# Patient Record
Sex: FEMALE | Race: WHITE | Employment: OTHER | URBAN - NONMETROPOLITAN AREA
[De-identification: names, ages, dates, MRNs, and addresses within clinical notes are randomized per-mention and may not be internally consistent; named-entity substitution may affect disease eponyms.]

---

## 2017-12-17 ENCOUNTER — HOSPITAL ENCOUNTER (OUTPATIENT)
Age: 37
Discharge: HOME | End: 2017-12-17
Payer: COMMERCIAL

## 2017-12-17 LAB
ALBUMIN SERPL-MCNC: 3.9 G/DL (ref 3.5–5.1)
ALP BLD-CCNC: 310 U/L (ref 38–126)
ALT SERPL-CCNC: 54 U/L (ref 11–66)
ANION GAP SERPL CALCULATED.3IONS-SCNC: 22 MEQ/L (ref 8–16)
AST SERPL-CCNC: 263 U/L (ref 5–40)
BILIRUB SERPL-MCNC: 3.1 MG/DL (ref 0.3–1.2)
BUN BLDV-MCNC: 3 MG/DL (ref 7–22)
CALCIUM SERPL-MCNC: 8.4 MG/DL (ref 8.5–10.5)
CHLORIDE BLD-SCNC: 98 MEQ/L (ref 98–111)
CO2: 24 MEQ/L (ref 23–33)
CREAT SERPL-MCNC: 0.4 MG/DL (ref 0.4–1.2)
FOLATE: 8.8 NG/ML (ref 4.8–24.2)
GFR SERPL CREATININE-BSD FRML MDRD: > 90 ML/MIN/1.73M2
GLUCOSE BLD-MCNC: 122 MG/DL (ref 70–108)
POTASSIUM SERPL-SCNC: 3.8 MEQ/L (ref 3.5–5.2)
SCAN OF BLOOD SMEAR: NORMAL
SODIUM BLD-SCNC: 144 MEQ/L (ref 135–145)
TOTAL PROTEIN: 7.2 G/DL (ref 6.1–8)
VITAMIN B-12: 1348 PG/ML (ref 211–911)

## 2017-12-17 PROCEDURE — 82746 ASSAY OF FOLIC ACID SERUM: CPT

## 2017-12-17 PROCEDURE — 85025 COMPLETE CBC W/AUTO DIFF WBC: CPT

## 2017-12-17 PROCEDURE — 36415 COLL VENOUS BLD VENIPUNCTURE: CPT

## 2017-12-17 PROCEDURE — 80053 COMPREHEN METABOLIC PANEL: CPT

## 2017-12-17 PROCEDURE — 82607 VITAMIN B-12: CPT

## 2017-12-18 LAB
ANISOCYTOSIS: ABNORMAL
BASOPHILS # BLD: 0.3 %
BASOPHILS ABSOLUTE: 0 THOU/MM3 (ref 0–0.1)
EOSINOPHIL # BLD: 0.7 %
EOSINOPHILS ABSOLUTE: 0 THOU/MM3 (ref 0–0.4)
HCT VFR BLD CALC: 36.8 % (ref 37–47)
HEMOGLOBIN: 12.3 GM/DL (ref 12–16)
LYMPHOCYTES # BLD: 45.2 %
LYMPHOCYTES ABSOLUTE: 2.4 THOU/MM3 (ref 1–4.8)
MACROCYTES: ABNORMAL
MCH RBC QN AUTO: 33.1 PG (ref 27–31)
MCHC RBC AUTO-ENTMCNC: 33.4 GM/DL (ref 33–37)
MCV RBC AUTO: 99.1 FL (ref 81–99)
MONOCYTES # BLD: 7 %
MONOCYTES ABSOLUTE: 0.4 THOU/MM3 (ref 0.4–1.3)
NUCLEATED RED BLOOD CELLS: 0 /100 WBC
PATHOLOGIST REVIEW: ABNORMAL
PDW BLD-RTO: 14.8 % (ref 11.5–14.5)
PLATELET # BLD: 87 THOU/MM3 (ref 130–400)
PLATELET ESTIMATE: ABNORMAL
PMV BLD AUTO: 9.6 MCM (ref 7.4–10.4)
POIKILOCYTES: SLIGHT
RBC # BLD: 3.72 MILL/MM3 (ref 4.2–5.4)
SEG NEUTROPHILS: 46.8 %
SEGMENTED NEUTROPHILS ABSOLUTE COUNT: 2.5 THOU/MM3 (ref 1.8–7.7)
TARGET CELLS: ABNORMAL
WBC # BLD: 5.4 THOU/MM3 (ref 4.8–10.8)

## 2018-02-18 ENCOUNTER — APPOINTMENT (OUTPATIENT)
Dept: CT IMAGING | Age: 38
DRG: 369 | End: 2018-02-18
Payer: MEDICARE

## 2018-02-18 ENCOUNTER — APPOINTMENT (OUTPATIENT)
Dept: GENERAL RADIOLOGY | Age: 38
DRG: 369 | End: 2018-02-18
Payer: MEDICARE

## 2018-02-18 ENCOUNTER — HOSPITAL ENCOUNTER (INPATIENT)
Age: 38
LOS: 6 days | Discharge: HOME | DRG: 369 | End: 2018-02-24
Attending: FAMILY MEDICINE | Admitting: INTERNAL MEDICINE
Payer: MEDICARE

## 2018-02-18 DIAGNOSIS — K72.10 END STAGE LIVER DISEASE (HCC): ICD-10-CM

## 2018-02-18 DIAGNOSIS — R10.84 GENERALIZED ABDOMINAL PAIN: ICD-10-CM

## 2018-02-18 DIAGNOSIS — K92.0 HEMATEMESIS WITH NAUSEA: Primary | ICD-10-CM

## 2018-02-18 LAB
ALBUMIN SERPL-MCNC: 1.8 GM/DL (ref 3.4–5)
ALBUMIN SERPL-MCNC: 2.2 G/DL (ref 3.5–5.1)
ALP BLD-CCNC: 276 U/L (ref 38–126)
ALP BLD-CCNC: 329 U/L (ref 46–116)
ALT SERPL-CCNC: 24 U/L (ref 11–66)
ALT SERPL-CCNC: 33 U/L (ref 14–63)
ANALYZED BY:: NORMAL
ANION GAP SERPL CALCULATED.3IONS-SCNC: 14 MEQ/L (ref 8–16)
ANION GAP: 11 MEQ/L (ref 8–16)
AST SERPL-CCNC: 103 U/L (ref 5–40)
AST SERPL-CCNC: 118 U/L (ref 15–37)
BASOPHILS # BLD: 0 %
BASOPHILS # BLD: 0 % (ref 0–3)
BASOPHILS ABSOLUTE: 0 THOU/MM3 (ref 0–0.1)
BILIRUB SERPL-MCNC: 3.5 MG/DL (ref 0.2–1)
BILIRUB SERPL-MCNC: 3.5 MG/DL (ref 0.3–1.2)
BUN BLDV-MCNC: 1 MG/DL (ref 7–18)
BUN BLDV-MCNC: < 2 MG/DL (ref 7–22)
CALCIUM SERPL-MCNC: 7.5 MG/DL (ref 8.5–10.5)
CHLORIDE BLD-SCNC: 98 MEQ/L (ref 98–107)
CHLORIDE BLD-SCNC: 99 MEQ/L (ref 98–111)
CO2: 21 MEQ/L (ref 23–33)
CO2: 25 MEQ/L (ref 21–32)
CREAT SERPL-MCNC: 0.2 MG/DL (ref 0.4–1.2)
CREAT SERPL-MCNC: 0.6 MG/DL (ref 0.6–1.3)
DATE OF COLLECTION: NORMAL
DIFFERENTIAL, MANUAL: NORMAL
DRAWN BY: NORMAL
EOSINOPHIL # BLD: 0 %
EOSINOPHILS ABSOLUTE: 0 THOU/MM3 (ref 0–0.4)
EOSINOPHILS RELATIVE PERCENT: 0 % (ref 0–4)
ETHYL ALCOHOL: 0.27 % (GM/DL)
GFR SERPL CREATININE-BSD FRML MDRD: > 90 ML/MIN/1.73M2
GFR, ESTIMATED: > 90 ML/MIN/1.73M2
GLUCOSE BLD-MCNC: 146 MG/DL (ref 74–106)
GLUCOSE BLD-MCNC: 147 MG/DL (ref 70–108)
HCT VFR BLD CALC: 26.2 % (ref 37–47)
HCT VFR BLD CALC: 29.3 % (ref 37–47)
HEMOGLOBIN: 10.1 GM/DL (ref 12–16)
HEMOGLOBIN: 8.6 GM/DL (ref 12–16)
INR BLD: 1.78 (ref 0.85–1.13)
LACTATE: 6.3 MMOL/L (ref 0.9–1.7)
LACTIC ACID: 3.4 MMOL/L (ref 0.5–2.2)
LIPASE: 43 U/L (ref 73–393)
LYMPHOCYTES # BLD: 30 % (ref 15–47)
LYMPHOCYTES # BLD: 8.6 %
LYMPHOCYTES ABSOLUTE: 0.5 THOU/MM3 (ref 1–4.8)
Lab: 830
Lab: NORMAL
MACROCYTES: ABNORMAL
MAGNESIUM: 1.8 MG/DL (ref 1.6–2.4)
MCH RBC QN AUTO: 33 PG (ref 27–31)
MCH RBC QN AUTO: 33.2 PG (ref 27–31)
MCHC RBC AUTO-ENTMCNC: 33 GM/DL (ref 33–37)
MCHC RBC AUTO-ENTMCNC: 34.5 GM/DL (ref 33–37)
MCV RBC AUTO: 100 FL (ref 81–99)
MCV RBC AUTO: 96.2 FL (ref 81–99)
MONOCYTES # BLD: 9.7 %
MONOCYTES ABSOLUTE: 0.6 THOU/MM3 (ref 0.4–1.3)
MONOCYTES: 7 % (ref 0–12)
MRSA SCREEN RT-PCR: POSITIVE
NUCLEATED RED BLOOD CELLS: 0 /100 WBC
PDW BLD-RTO: 12 % (ref 11.5–14.5)
PDW BLD-RTO: 13.7 % (ref 11.5–14.5)
PHOSPHORUS: 3 MG/DL (ref 2.4–4.7)
PLATELET # BLD: 163 THOU/MM3 (ref 130–400)
PLATELET # BLD: 170 THOU/MM3 (ref 130–400)
PLATELET ESTIMATE: ABNORMAL
PMV BLD AUTO: 8.5 FL (ref 7.4–10.4)
PMV BLD AUTO: 8.8 FL (ref 7.4–10.4)
POC CALCIUM: 7.5 MG/DL (ref 8.5–10.1)
POTASSIUM SERPL-SCNC: 4.2 MEQ/L (ref 3.5–5.1)
POTASSIUM SERPL-SCNC: 4.5 MEQ/L (ref 3.5–5.2)
RBC # BLD: 2.62 MILL/MM3 (ref 4.2–5.4)
RBC # BLD: 3.05 MILL/MM3 (ref 4.2–5.4)
SEG NEUTROPHILS: 81.7 %
SEGMENTED NEUTROPHILS ABSOLUTE COUNT: 5 THOU/MM3 (ref 1.8–7.7)
SEGS: 61 % (ref 43–75)
SODIUM BLD-SCNC: 134 MEQ/L (ref 135–145)
SODIUM BLD-SCNC: 134 MEQ/L (ref 136–145)
STABS BLOOD: 2 % (ref 0–4)
TOTAL PROTEIN: 5.2 G/DL (ref 6.1–8)
TOTAL PROTEIN: 5.8 GM/DL (ref 6.4–8.2)
WBC # BLD: 6.1 THOU/MM3 (ref 4.8–10.8)
WBC # BLD: 8.5 THOU/MM3 (ref 4.8–10.8)

## 2018-02-18 PROCEDURE — G0480 DRUG TEST DEF 1-7 CLASSES: HCPCS

## 2018-02-18 PROCEDURE — 36415 COLL VENOUS BLD VENIPUNCTURE: CPT

## 2018-02-18 PROCEDURE — 2700000000 HC OXYGEN THERAPY PER DAY

## 2018-02-18 PROCEDURE — 85610 PROTHROMBIN TIME: CPT

## 2018-02-18 PROCEDURE — 83605 ASSAY OF LACTIC ACID: CPT

## 2018-02-18 PROCEDURE — 6360000002 HC RX W HCPCS: Performed by: INTERNAL MEDICINE

## 2018-02-18 PROCEDURE — 2580000003 HC RX 258: Performed by: INTERNAL MEDICINE

## 2018-02-18 PROCEDURE — P9016 RBC LEUKOCYTES REDUCED: HCPCS

## 2018-02-18 PROCEDURE — 87641 MR-STAPH DNA AMP PROBE: CPT

## 2018-02-18 PROCEDURE — C9113 INJ PANTOPRAZOLE SODIUM, VIA: HCPCS | Performed by: FAMILY MEDICINE

## 2018-02-18 PROCEDURE — 2580000003 HC RX 258: Performed by: FAMILY MEDICINE

## 2018-02-18 PROCEDURE — 99285 EMERGENCY DEPT VISIT HI MDM: CPT

## 2018-02-18 PROCEDURE — 85025 COMPLETE CBC W/AUTO DIFF WBC: CPT

## 2018-02-18 PROCEDURE — 2000000000 HC ICU R&B

## 2018-02-18 PROCEDURE — 80053 COMPREHEN METABOLIC PANEL: CPT

## 2018-02-18 PROCEDURE — 6360000002 HC RX W HCPCS: Performed by: FAMILY MEDICINE

## 2018-02-18 PROCEDURE — 96361 HYDRATE IV INFUSION ADD-ON: CPT

## 2018-02-18 PROCEDURE — 96375 TX/PRO/DX INJ NEW DRUG ADDON: CPT

## 2018-02-18 PROCEDURE — 6360000002 HC RX W HCPCS

## 2018-02-18 PROCEDURE — 2500000003 HC RX 250 WO HCPCS: Performed by: INTERNAL MEDICINE

## 2018-02-18 PROCEDURE — 36430 TRANSFUSION BLD/BLD COMPNT: CPT

## 2018-02-18 PROCEDURE — 2720000010 HC SURG SUPPLY STERILE: Performed by: INTERNAL MEDICINE

## 2018-02-18 PROCEDURE — 84100 ASSAY OF PHOSPHORUS: CPT

## 2018-02-18 PROCEDURE — 96374 THER/PROPH/DIAG INJ IV PUSH: CPT

## 2018-02-18 PROCEDURE — 83735 ASSAY OF MAGNESIUM: CPT

## 2018-02-18 PROCEDURE — 83690 ASSAY OF LIPASE: CPT

## 2018-02-18 PROCEDURE — 99291 CRITICAL CARE FIRST HOUR: CPT | Performed by: INTERNAL MEDICINE

## 2018-02-18 PROCEDURE — 71046 X-RAY EXAM CHEST 2 VIEWS: CPT

## 2018-02-18 PROCEDURE — 0W9G3ZZ DRAINAGE OF PERITONEAL CAVITY, PERCUTANEOUS APPROACH: ICD-10-PCS | Performed by: INTERNAL MEDICINE

## 2018-02-18 PROCEDURE — 99152 MOD SED SAME PHYS/QHP 5/>YRS: CPT | Performed by: INTERNAL MEDICINE

## 2018-02-18 PROCEDURE — 3609012300 HC EGD BAND LIGATION ESOPHGEAL/GASTRIC VARICES: Performed by: INTERNAL MEDICINE

## 2018-02-18 PROCEDURE — 87081 CULTURE SCREEN ONLY: CPT

## 2018-02-18 PROCEDURE — 0W3P8ZZ CONTROL BLEEDING IN GASTROINTESTINAL TRACT, VIA NATURAL OR ARTIFICIAL OPENING ENDOSCOPIC: ICD-10-PCS | Performed by: INTERNAL MEDICINE

## 2018-02-18 PROCEDURE — 74176 CT ABD & PELVIS W/O CONTRAST: CPT

## 2018-02-18 RX ORDER — LORAZEPAM 1 MG/1
1 TABLET ORAL
Status: DISCONTINUED | OUTPATIENT
Start: 2018-02-18 | End: 2018-02-24 | Stop reason: HOSPADM

## 2018-02-18 RX ORDER — SODIUM CHLORIDE 0.9 % (FLUSH) 0.9 %
10 SYRINGE (ML) INJECTION PRN
Status: DISCONTINUED | OUTPATIENT
Start: 2018-02-18 | End: 2018-02-24 | Stop reason: HOSPADM

## 2018-02-18 RX ORDER — SODIUM CHLORIDE 0.9 % (FLUSH) 0.9 %
10 SYRINGE (ML) INJECTION EVERY 12 HOURS SCHEDULED
Status: DISCONTINUED | OUTPATIENT
Start: 2018-02-18 | End: 2018-02-24 | Stop reason: HOSPADM

## 2018-02-18 RX ORDER — 0.9 % SODIUM CHLORIDE 0.9 %
500 INTRAVENOUS SOLUTION INTRAVENOUS ONCE
Status: COMPLETED | OUTPATIENT
Start: 2018-02-18 | End: 2018-02-18

## 2018-02-18 RX ORDER — FENTANYL CITRATE 50 UG/ML
INJECTION, SOLUTION INTRAMUSCULAR; INTRAVENOUS PRN
Status: DISCONTINUED | OUTPATIENT
Start: 2018-02-18 | End: 2018-02-24 | Stop reason: HOSPADM

## 2018-02-18 RX ORDER — 0.9 % SODIUM CHLORIDE 0.9 %
1000 INTRAVENOUS SOLUTION INTRAVENOUS ONCE
Status: COMPLETED | OUTPATIENT
Start: 2018-02-18 | End: 2018-02-18

## 2018-02-18 RX ORDER — OCTREOTIDE ACETATE 100 UG/ML
100 INJECTION, SOLUTION INTRAVENOUS; SUBCUTANEOUS ONCE
Status: COMPLETED | OUTPATIENT
Start: 2018-02-18 | End: 2018-02-18

## 2018-02-18 RX ORDER — LORAZEPAM 2 MG/ML
4 INJECTION INTRAMUSCULAR
Status: DISCONTINUED | OUTPATIENT
Start: 2018-02-18 | End: 2018-02-24 | Stop reason: HOSPADM

## 2018-02-18 RX ORDER — MIDAZOLAM HYDROCHLORIDE 1 MG/ML
INJECTION INTRAMUSCULAR; INTRAVENOUS PRN
Status: DISCONTINUED | OUTPATIENT
Start: 2018-02-18 | End: 2018-02-24 | Stop reason: HOSPADM

## 2018-02-18 RX ORDER — LORAZEPAM 2 MG/ML
INJECTION INTRAMUSCULAR
Status: COMPLETED
Start: 2018-02-18 | End: 2018-02-18

## 2018-02-18 RX ORDER — LORAZEPAM 2 MG/ML
3 INJECTION INTRAMUSCULAR
Status: DISCONTINUED | OUTPATIENT
Start: 2018-02-18 | End: 2018-02-24 | Stop reason: HOSPADM

## 2018-02-18 RX ORDER — DEXMEDETOMIDINE HYDROCHLORIDE 4 UG/ML
0.2 INJECTION, SOLUTION INTRAVENOUS CONTINUOUS
Status: DISCONTINUED | OUTPATIENT
Start: 2018-02-18 | End: 2018-02-18 | Stop reason: SDUPTHER

## 2018-02-18 RX ORDER — LORAZEPAM 2 MG/1
4 TABLET ORAL
Status: DISCONTINUED | OUTPATIENT
Start: 2018-02-18 | End: 2018-02-24 | Stop reason: HOSPADM

## 2018-02-18 RX ORDER — LORAZEPAM 2 MG/1
2 TABLET ORAL
Status: DISCONTINUED | OUTPATIENT
Start: 2018-02-18 | End: 2018-02-24 | Stop reason: HOSPADM

## 2018-02-18 RX ORDER — LORAZEPAM 2 MG/ML
2 INJECTION INTRAMUSCULAR
Status: DISCONTINUED | OUTPATIENT
Start: 2018-02-18 | End: 2018-02-24 | Stop reason: HOSPADM

## 2018-02-18 RX ORDER — ONDANSETRON 2 MG/ML
4 INJECTION INTRAMUSCULAR; INTRAVENOUS ONCE
Status: COMPLETED | OUTPATIENT
Start: 2018-02-18 | End: 2018-02-18

## 2018-02-18 RX ORDER — LORAZEPAM 2 MG/ML
1 INJECTION INTRAMUSCULAR ONCE
Status: COMPLETED | OUTPATIENT
Start: 2018-02-18 | End: 2018-02-18

## 2018-02-18 RX ORDER — METOPROLOL TARTRATE 5 MG/5ML
5 INJECTION INTRAVENOUS EVERY 6 HOURS PRN
Status: DISCONTINUED | OUTPATIENT
Start: 2018-02-18 | End: 2018-02-24 | Stop reason: HOSPADM

## 2018-02-18 RX ORDER — MORPHINE SULFATE 2 MG/ML
2 INJECTION, SOLUTION INTRAMUSCULAR; INTRAVENOUS EVERY 4 HOURS PRN
Status: DISCONTINUED | OUTPATIENT
Start: 2018-02-18 | End: 2018-02-22 | Stop reason: CLARIF

## 2018-02-18 RX ORDER — LORAZEPAM 2 MG/ML
1 INJECTION INTRAMUSCULAR
Status: DISCONTINUED | OUTPATIENT
Start: 2018-02-18 | End: 2018-02-24 | Stop reason: HOSPADM

## 2018-02-18 RX ORDER — PANTOPRAZOLE SODIUM 40 MG/10ML
80 INJECTION, POWDER, LYOPHILIZED, FOR SOLUTION INTRAVENOUS ONCE
Status: COMPLETED | OUTPATIENT
Start: 2018-02-18 | End: 2018-02-18

## 2018-02-18 RX ADMIN — Medication 10 ML: at 21:31

## 2018-02-18 RX ADMIN — SODIUM CHLORIDE 1000 ML: 9 INJECTION, SOLUTION INTRAVENOUS at 10:21

## 2018-02-18 RX ADMIN — OCTREOTIDE ACETATE 100 MCG: 100 INJECTION, SOLUTION INTRAVENOUS; SUBCUTANEOUS at 12:20

## 2018-02-18 RX ADMIN — Medication 8 MG/HR: at 13:27

## 2018-02-18 RX ADMIN — Medication 50 MCG/HR: at 21:10

## 2018-02-18 RX ADMIN — Medication 8 MG/HR: at 21:11

## 2018-02-18 RX ADMIN — PANTOPRAZOLE SODIUM 40 MG: 40 INJECTION, POWDER, FOR SOLUTION INTRAVENOUS at 08:36

## 2018-02-18 RX ADMIN — LORAZEPAM 2 MG: 2 INJECTION INTRAMUSCULAR; INTRAVENOUS at 09:28

## 2018-02-18 RX ADMIN — CEFTRIAXONE 1 G: 1 INJECTION, POWDER, FOR SOLUTION INTRAMUSCULAR; INTRAVENOUS at 13:55

## 2018-02-18 RX ADMIN — LORAZEPAM 1 MG: 2 INJECTION INTRAMUSCULAR; INTRAVENOUS at 09:28

## 2018-02-18 RX ADMIN — MORPHINE SULFATE 2 MG: 2 INJECTION, SOLUTION INTRAMUSCULAR; INTRAVENOUS at 14:39

## 2018-02-18 RX ADMIN — FOLIC ACID: 5 INJECTION, SOLUTION INTRAMUSCULAR; INTRAVENOUS; SUBCUTANEOUS at 16:39

## 2018-02-18 RX ADMIN — SODIUM CHLORIDE 250 ML: 9 INJECTION, SOLUTION INTRAVENOUS at 08:34

## 2018-02-18 RX ADMIN — Medication 50 MCG/HR: at 12:20

## 2018-02-18 RX ADMIN — ONDANSETRON 4 MG: 2 INJECTION INTRAMUSCULAR; INTRAVENOUS at 08:33

## 2018-02-18 RX ADMIN — Medication 0.2 MCG/KG/HR: at 16:59

## 2018-02-18 ASSESSMENT — ENCOUNTER SYMPTOMS
COUGH: 0
EYE DISCHARGE: 0
CONSTIPATION: 0
COLOR CHANGE: 0
WHEEZING: 0
NAUSEA: 1
SORE THROAT: 0
RHINORRHEA: 0
EYE REDNESS: 0
VOICE CHANGE: 0
STRIDOR: 0
DIARRHEA: 0
FACIAL SWELLING: 0
PHOTOPHOBIA: 0
VOMITING: 0
CHEST TIGHTNESS: 0
SHORTNESS OF BREATH: 0
ABDOMINAL DISTENTION: 1
ABDOMINAL PAIN: 1
EYE PAIN: 0
BACK PAIN: 0

## 2018-02-18 ASSESSMENT — PAIN DESCRIPTION - LOCATION
LOCATION: ABDOMEN
LOCATION: ABDOMEN

## 2018-02-18 ASSESSMENT — PAIN SCALES - GENERAL
PAINLEVEL_OUTOF10: 8
PAINLEVEL_OUTOF10: 9

## 2018-02-18 ASSESSMENT — PAIN DESCRIPTION - DESCRIPTORS: DESCRIPTORS: DISCOMFORT

## 2018-02-18 ASSESSMENT — PAIN DESCRIPTION - PAIN TYPE
TYPE: ACUTE PAIN
TYPE: ACUTE PAIN

## 2018-02-18 NOTE — POST SEDATION
[address] [address]  Sedation/Analgesia Post Sedation Record    Patient: Gordo Paulino : 1980  Med Rec#: [id number] Acc#: [id number]   Procedure Performed By: Lannie Blizzard  Primary Care Physician: Riaz Troncoso NP    POST-PROCEDURE    Physicians/Assistants: Lannie Blizzard  Procedure Performed:    Sedation/Anesthesia:     Estimated Blood Loss:          ml  Specimens Removed:  [x]None []Other:      Disposition of Specimen:  []Pathology [x]Other      Complications:   [x]None Immediate []Other:     Post-procedure Diagnosis/Findings:             Recommendations:     varices Lannie Blizzard, MD Syliva Epp  Electronically signed 2018 at 1:07 PM

## 2018-02-18 NOTE — PROGRESS NOTES
Pt. Declining NG tube at this time, st. \"I had surgery on my nose and last time they had to have anesthia come in and place it in radiology, I will pull it out\".

## 2018-02-18 NOTE — ED NOTES
ABdomen distended, ascites, hypoactive BS x 4, tenderness t/o.      Denver De Jesus RN  02/18/18 6230
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
## 2018-02-18 NOTE — CONSULTS
daily     Historical Provider, MD   vitamin B-1 100 MG tablet Take 1 tablet by mouth daily 6/12/17   Leigh Plunkett MD   Naproxen Sodium (ALEVE PO) Take 1 tablet by mouth every 12 hours as needed    Historical Provider, MD   promethazine (PHENERGAN) 12.5 MG tablet Take 12.5 mg by mouth every 4 hours as needed for Nausea    Historical Provider, MD   LORazepam (ATIVAN) 0.5 MG tablet Take 0.5 mg by mouth every 6 hours as needed for Anxiety    Historical Provider, MD   sucralfate (CARAFATE) 1 GM/10ML suspension Take 10 mLs by mouth 4 times daily (before meals and nightly) 8/30/16   Naomy North CNP   folic acid (FOLVITE) 1 MG tablet Take 1 mg by mouth daily    Historical Provider, MD   nadolol (CORGARD) 20 MG tablet Take 20 mg by mouth daily     Historical Provider, MD   omeprazole (PRILOSEC) 20 MG capsule Take 20 mg by mouth daily    Historical Provider, MD   melatonin 3 MG TABS tablet Take 10 mg by mouth nightly as needed     Historical Provider, MD   Cyanocobalamin (VITAMIN B-12) 1000 MCG CR tablet Inject 1,000 mcg into the muscle every 30 days     Historical Provider, MD   spironolactone (ALDACTONE) 100 MG tablet Take 50 mg by mouth every other day     Historical Provider, MD   Multiple Vitamins-Minerals (THERAPEUTIC MULTIVITAMIN-MINERALS) tablet Take 1 tablet by mouth daily.     Historical Provider, MD     Additional information:       PHYSICAL:   Heart:  [x]Regular rate and rhythm  []Other:    Lungs:  [x]Clear    []Other:    Abdomen: [x]Soft    []Other:    Mental Status: [x]Alert & Oriented  []Other:      VITAL SIGNS   Patient Vitals for the past 24 hrs:   BP Temp Temp src Pulse Resp SpO2 Height Weight   02/18/18 1128 118/84 98.2 °F (36.8 °C) Oral 124 21 99 % - -   02/18/18 1120 - - - 128 20 93 % 5' 5\" (1.651 m) 154 lb 1.6 oz (69.9 kg)   02/18/18 1020 114/82 - - 124 16 100 % - -   02/18/18 1005 (!) 121/95 - - 128 23 100 % - -   02/18/18 0949 112/85 - - 120 18 98 % - -   02/18/18 0934 121/79 - - 125 16 99 %

## 2018-02-19 PROBLEM — E44.0 MODERATE MALNUTRITION (HCC): Status: ACTIVE | Noted: 2018-02-19

## 2018-02-19 LAB
ABO: NORMAL
ALBUMIN SERPL-MCNC: 3 G/DL (ref 3.5–5.1)
AMYLASE FLUID: < 3 U/L
ANION GAP SERPL CALCULATED.3IONS-SCNC: 8 MEQ/L (ref 8–16)
ANISOCYTOSIS: ABNORMAL
ANTIBODY SCREEN: NORMAL
BASOPHILS # BLD: 1 %
BASOPHILS ABSOLUTE: 0 THOU/MM3 (ref 0–0.1)
BUN BLDV-MCNC: < 2 MG/DL (ref 7–22)
CALCIUM IONIZED: 0.94 MMOL/L (ref 1.12–1.32)
CALCIUM SERPL-MCNC: 6.9 MG/DL (ref 8.5–10.5)
CHLORIDE BLD-SCNC: 105 MEQ/L (ref 98–111)
CO2: 24 MEQ/L (ref 23–33)
CREAT SERPL-MCNC: 0.3 MG/DL (ref 0.4–1.2)
EOSINOPHIL # BLD: 1.1 %
EOSINOPHILS ABSOLUTE: 0 THOU/MM3 (ref 0–0.4)
GFR SERPL CREATININE-BSD FRML MDRD: > 90 ML/MIN/1.73M2
GLUCOSE BLD-MCNC: 91 MG/DL (ref 70–108)
GLUCOSE, FLUID: 115 MG/DL
HCT VFR BLD CALC: 20.9 % (ref 37–47)
HCT VFR BLD CALC: 28.9 % (ref 37–47)
HEMOGLOBIN: 7 GM/DL (ref 12–16)
HEMOGLOBIN: 9.9 GM/DL (ref 12–16)
LD, FLUID: 43 U/L
LD: 213 U/L (ref 100–190)
LYMPHOCYTES # BLD: 35.4 %
LYMPHOCYTES ABSOLUTE: 1.1 THOU/MM3 (ref 1–4.8)
MACROCYTES: ABNORMAL
MAGNESIUM: 1.7 MG/DL (ref 1.6–2.4)
MCH RBC QN AUTO: 33.4 PG (ref 27–31)
MCH RBC QN AUTO: 34.5 PG (ref 27–31)
MCHC RBC AUTO-ENTMCNC: 33.5 GM/DL (ref 33–37)
MCHC RBC AUTO-ENTMCNC: 34.2 GM/DL (ref 33–37)
MCV RBC AUTO: 103 FL (ref 81–99)
MCV RBC AUTO: 97.5 FL (ref 81–99)
MONOCYTES # BLD: 12.7 %
MONOCYTES ABSOLUTE: 0.4 THOU/MM3 (ref 0.4–1.3)
NUCLEATED RED BLOOD CELLS: 0 /100 WBC
PATHOLOGIST REVIEW: ABNORMAL
PDW BLD-RTO: 13.1 % (ref 11.5–14.5)
PDW BLD-RTO: 15.3 % (ref 11.5–14.5)
PHOSPHORUS: 2.2 MG/DL (ref 2.4–4.7)
PLATELET # BLD: 91 THOU/MM3 (ref 130–400)
PLATELET # BLD: 93 THOU/MM3 (ref 130–400)
PLATELET ESTIMATE: ABNORMAL
PMV BLD AUTO: 9.3 FL (ref 7.4–10.4)
PMV BLD AUTO: 9.5 FL (ref 7.4–10.4)
POIKILOCYTES: ABNORMAL
POTASSIUM SERPL-SCNC: 4.5 MEQ/L (ref 3.5–5.2)
PROTEIN FLUID: 0.4 GM/DL
RBC # BLD: 2.03 MILL/MM3 (ref 4.2–5.4)
RBC # BLD: 2.96 MILL/MM3 (ref 4.2–5.4)
RH FACTOR: NORMAL
SCAN OF BLOOD SMEAR: NORMAL
SEG NEUTROPHILS: 49.8 %
SEGMENTED NEUTROPHILS ABSOLUTE COUNT: 1.5 THOU/MM3 (ref 1.8–7.7)
SODIUM BLD-SCNC: 137 MEQ/L (ref 135–145)
TARGET CELLS: ABNORMAL
WBC # BLD: 3 THOU/MM3 (ref 4.8–10.8)
WBC # BLD: 3.1 THOU/MM3 (ref 4.8–10.8)

## 2018-02-19 PROCEDURE — 87070 CULTURE OTHR SPECIMN AEROBIC: CPT

## 2018-02-19 PROCEDURE — 86850 RBC ANTIBODY SCREEN: CPT

## 2018-02-19 PROCEDURE — 82042 OTHER SOURCE ALBUMIN QUAN EA: CPT

## 2018-02-19 PROCEDURE — P9046 ALBUMIN (HUMAN), 25%, 20 ML: HCPCS | Performed by: INTERNAL MEDICINE

## 2018-02-19 PROCEDURE — 83615 LACTATE (LD) (LDH) ENZYME: CPT

## 2018-02-19 PROCEDURE — 85025 COMPLETE CBC W/AUTO DIFF WBC: CPT

## 2018-02-19 PROCEDURE — 2580000003 HC RX 258: Performed by: FAMILY MEDICINE

## 2018-02-19 PROCEDURE — 83735 ASSAY OF MAGNESIUM: CPT

## 2018-02-19 PROCEDURE — 6360000002 HC RX W HCPCS: Performed by: FAMILY MEDICINE

## 2018-02-19 PROCEDURE — 36415 COLL VENOUS BLD VENIPUNCTURE: CPT

## 2018-02-19 PROCEDURE — 89051 BODY FLUID CELL COUNT: CPT

## 2018-02-19 PROCEDURE — 85027 COMPLETE CBC AUTOMATED: CPT

## 2018-02-19 PROCEDURE — 80048 BASIC METABOLIC PNL TOTAL CA: CPT

## 2018-02-19 PROCEDURE — 2580000003 HC RX 258: Performed by: INTERNAL MEDICINE

## 2018-02-19 PROCEDURE — P9016 RBC LEUKOCYTES REDUCED: HCPCS

## 2018-02-19 PROCEDURE — 86923 COMPATIBILITY TEST ELECTRIC: CPT

## 2018-02-19 PROCEDURE — 6360000002 HC RX W HCPCS: Performed by: INTERNAL MEDICINE

## 2018-02-19 PROCEDURE — 36430 TRANSFUSION BLD/BLD COMPNT: CPT

## 2018-02-19 PROCEDURE — 84157 ASSAY OF PROTEIN OTHER: CPT

## 2018-02-19 PROCEDURE — 86901 BLOOD TYPING SEROLOGIC RH(D): CPT

## 2018-02-19 PROCEDURE — 84100 ASSAY OF PHOSPHORUS: CPT

## 2018-02-19 PROCEDURE — 82330 ASSAY OF CALCIUM: CPT

## 2018-02-19 PROCEDURE — 82040 ASSAY OF SERUM ALBUMIN: CPT

## 2018-02-19 PROCEDURE — 87075 CULTR BACTERIA EXCEPT BLOOD: CPT

## 2018-02-19 PROCEDURE — 87205 SMEAR GRAM STAIN: CPT

## 2018-02-19 PROCEDURE — 82150 ASSAY OF AMYLASE: CPT

## 2018-02-19 PROCEDURE — 2500000003 HC RX 250 WO HCPCS: Performed by: INTERNAL MEDICINE

## 2018-02-19 PROCEDURE — 82945 GLUCOSE OTHER FLUID: CPT

## 2018-02-19 PROCEDURE — 86900 BLOOD TYPING SEROLOGIC ABO: CPT

## 2018-02-19 PROCEDURE — 2700000000 HC OXYGEN THERAPY PER DAY

## 2018-02-19 PROCEDURE — C9113 INJ PANTOPRAZOLE SODIUM, VIA: HCPCS | Performed by: FAMILY MEDICINE

## 2018-02-19 PROCEDURE — 99291 CRITICAL CARE FIRST HOUR: CPT | Performed by: INTERNAL MEDICINE

## 2018-02-19 PROCEDURE — 2000000000 HC ICU R&B

## 2018-02-19 RX ORDER — 0.9 % SODIUM CHLORIDE 0.9 %
250 INTRAVENOUS SOLUTION INTRAVENOUS ONCE
Status: DISCONTINUED | OUTPATIENT
Start: 2018-02-19 | End: 2018-02-24 | Stop reason: HOSPADM

## 2018-02-19 RX ORDER — ALBUMIN (HUMAN) 12.5 G/50ML
25 SOLUTION INTRAVENOUS
Status: COMPLETED | OUTPATIENT
Start: 2018-02-19 | End: 2018-02-19

## 2018-02-19 RX ORDER — 0.9 % SODIUM CHLORIDE 0.9 %
250 INTRAVENOUS SOLUTION INTRAVENOUS ONCE
Status: COMPLETED | OUTPATIENT
Start: 2018-02-19 | End: 2018-02-19

## 2018-02-19 RX ORDER — ALBUMIN (HUMAN) 12.5 G/50ML
50 SOLUTION INTRAVENOUS ONCE
Status: DISCONTINUED | OUTPATIENT
Start: 2018-02-19 | End: 2018-02-19

## 2018-02-19 RX ADMIN — Medication 8 MG/HR: at 09:01

## 2018-02-19 RX ADMIN — Medication 50 MCG/HR: at 07:50

## 2018-02-19 RX ADMIN — LORAZEPAM 2 MG: 2 INJECTION INTRAMUSCULAR; INTRAVENOUS at 17:26

## 2018-02-19 RX ADMIN — Medication 10 ML: at 11:41

## 2018-02-19 RX ADMIN — CEFTRIAXONE 1 G: 1 INJECTION, POWDER, FOR SOLUTION INTRAMUSCULAR; INTRAVENOUS at 13:19

## 2018-02-19 RX ADMIN — CALCIUM GLUCONATE 2 G: 94 INJECTION, SOLUTION INTRAVENOUS at 09:09

## 2018-02-19 RX ADMIN — Medication 8 MG/HR: at 20:08

## 2018-02-19 RX ADMIN — LORAZEPAM 1 MG: 2 INJECTION INTRAMUSCULAR; INTRAVENOUS at 20:03

## 2018-02-19 RX ADMIN — SODIUM CHLORIDE 250 ML: 9 INJECTION, SOLUTION INTRAVENOUS at 09:05

## 2018-02-19 RX ADMIN — ALBUMIN (HUMAN) 25 G: 0.25 INJECTION, SOLUTION INTRAVENOUS at 13:16

## 2018-02-19 RX ADMIN — Medication 0.6 MCG/KG/HR: at 17:44

## 2018-02-19 RX ADMIN — ALBUMIN (HUMAN) 25 G: 0.25 INJECTION, SOLUTION INTRAVENOUS at 13:08

## 2018-02-19 RX ADMIN — Medication 50 MCG/HR: at 20:02

## 2018-02-19 ASSESSMENT — PAIN DESCRIPTION - LOCATION: LOCATION: ABDOMEN

## 2018-02-19 ASSESSMENT — PAIN SCALES - GENERAL: PAINLEVEL_OUTOF10: 8

## 2018-02-19 ASSESSMENT — PAIN DESCRIPTION - PAIN TYPE: TYPE: ACUTE PAIN

## 2018-02-19 NOTE — PROGRESS NOTES
Gastroenterology Progress Note:   Patient: Rafia Pereira  : 1980  Acct#: [de-identified]    Patient Name:  Rafia Pereira , 40 y.o. , female with upper Gi bleeding s/p EGD 18 with variceal bleeding and banding    ASSESSMENT     1. Esophageal Variceal bleeding- s/p EGD 18 with 3 columns of stage II-III varices, s/p banding currently on octreotide drip and Protonix infusion. 2. Ulceration at GEJ as well as erosions in gastric pouch and the small bowel likely related to NSAID use  3. Anemia - secondary to GI bleed, hgb currently 7.0g, down from 10.1 on admission. Receiving 2 Units of PRBC's today. 4. Thrombocytopenia - platelets currently 72A  5. H/o Ishmael-en-Y surgery. 6. Liver cirrhosis with ascites likely due to alcohol - [facility] - typically on lactulose, nadolol, lasix, aldactone and PPI. Ascites is new in onset, last time was in .   7. History of pancreatitis secondary to alcoholism on CIWA protocol currently. Active Problems:    Hematemesis  Resolved Problems:    * No resolved hospital problems. *     Patient Active Problem List   Diagnosis    Pancreatitis    Acute pancreatitis    Anxiety    Anemia    Alcoholic cirrhosis (HCC)    Hypocalcemia    Diarrhea    Bloody stool    Essential hypertension    S/P gastric bypass    B[facility] disorder ([facility]    Esophageal varices (HCC)    Hypothyroidism    Alcoholic cirrhosis of liver without ascites ([facility]    Hematemesis       PLAN       1. Watch closely for rebleeding, melena, hematemesis. If rebleeds, then will need to be transferred to tertiary care center emergently for a TIPSS procedure. [facility] or [facility]. They are not done here at [facility]. 2.  OK to be on clear liquid diet at this time. 3.  Continue IV Antibiotics as had a GI Bleed and cirrhotic. 4.  Diagnostic paracentesis, take 250 mL out, send for Cell Count and Cultures, and if room, SAAG. 5.  Consider Psychiatry consult, consider set up with rehab as outpatient. She has Bipolar, yet, is on no medications as an outpatient for this.   5.5. Please discontinue Corgard/Nadolol, in hospitalized cirrhotics, continuation may increase risk of hepatorenal syndrome. 6.  I discussed plan with Dr. Brenden De Jesus.   All orders will be through him. 7. Try to avoid Benzodiazepenes, Narcotics which can increase risk of Hepatic Encephalopathy. SUBJECTIVE      Patient seen and examined. 24 hours events and chart reviewed. (  x )Medications Reviewed ;(   )Old records reviewed    Day 1 of stay. Feeling: ( [x]  )Better  ([]   ) Worse      ([]   )Same - Better than yesterday. Had 4 Liters removed by paracentesis yesterday, but, not sent for studies. Having some abdominal pain diffusely today. No fever or chills. ROS:    ENDOCRINE:  Positive for hypothyroidism. No diabetes. EMOTIONAL:   Positive for bipolar anxiety and depression by suicide attempt in the past.        PHYSICAL EXAMINATION:    In: 2916   Out: -   I/O last 3 completed shifts: In: 1963.5 [I.V.:1963.5]  Out: -         Vital Signs  /79   Pulse 69   Temp 98.2 °F (36.8 °C) (Oral)   Resp 13   Ht 5' 5\" (1.651 m)   Wt 148 lb 5.9 oz (67.3 kg)   SpO2 91%   BMI 24.69 kg/m²     General:   ([x]   )Comfortable      ([]   )Obese          (  [x] )chronically sick looking      other:    HEENT: ( [x]  )Palour(  [x] )No Icterus (   )ET tube in place                      Mucosa: ( x  )moist(   )dry : Other:    CV: ( [x]  )RRR(   [])Irregular/arrhythmias : Murmur: ([x]  ) No   ([]  ) Yes:    Resp: ([x]  )CTA Bilaterally,[x] No added sounds ( []  )Rhonci([]   )Wheeze ([]   )Rales    Abd: ([x]   )Soft([x]   ) Mild tenderness diffusely (  [x] )No shifting dullness to percussion.      Ext: ([x]   )No edema ( []  )Edema ( [x]  )No cyanosis    Skin: ( [x]  )Warm  ( []  )Cold   (   )Dry   ( x  )No rash    Neuro:    ( [x]  )Oriented X 3      ([]  )Confused      ( X  )  Other:  Moves all 4 extremities. REVIEW OF DIAGNOSTIC TESTING AND LABS:      Significant Diagnostic Studies:   2/18/18 EGD  IMPRESSION:  1. Variceal bleeding with three columns of stage III varices and one of  the varix had nipple sign and during banding, first band, it did start  actively bleeding. 2.  GE junction ulceration as well as erosions in the gastric pouch and the  small bowel probably likely from the NSAIDs.     RECOMMENDATIONS:  The patient is to stay in the ICU and have IV octreotide  for 48 hours, IV Protonix, n.p.o. We should watch for the DPs and the  patient was given antibiotics and the patient will probably benefit from  paracentesis tomorrow. I had a chance to talk to the nursing staff and I  had earlier talked to Dr. Trent Martin, the ICU physician. No family member  present. RADIOLOGY:    2/18/18 CT ABDOMEN PELVIS WO CONTRAST  1. There is a small amount of pericardial fluid along the anterior aspect of the heart measuring 0.8 cm in transverse dimension. 2. There is mild subsegmental atelectasis at the right posterior costophrenic angle and along the left major fissure. 3. There is fatty infiltration of the liver. 4. There is a large amount of ascites within the abdomen and pelvis. 5. There is diffuse mesenteric edema. 6. There is subcutaneous edema suggesting 3rd spacing.       LABS:      CBC:   Recent Labs      02/18/18   0830  02/18/18   1616  02/19/18   0420   WBC  8.5  6.1  3.1*   HGB  10.1*  8.6*  7.0*   PLT  170  163  91*       BMP:    Recent Labs      02/18/18   0830  02/18/18   1616  02/19/18   0420   NA  134*  134*  137   K  4.2  4.5  4.5   CL  98  99  105   CO2  25  21*  24   BUN  1*  <2*  <2*   CREATININE  0.6  0.2*  0.3*   GLUCOSE  146*  147*  91       Hepatic Function Panel:    Recent Labs      02/18/18   0830  02/18/18   1616   ALKPHOS  329*  276*   ALT  33  24   AST  118*  103*   PROT  5.8*  5.2*   BILITOT  3.5*  3.5*   LABALBU  1.8*  2.2*     Amylase and Lipase:  Recent Labs

## 2018-02-19 NOTE — PROCEDURES
[address] [place] [address]                                  PROCEDURE NOTE    PATIENT NAME: Isreal Bang                       :        1980  MED REC NO:   [id number]                           ROOM:       0006  ACCOUNT NO:   [de-identified]                           ADMIT DATE: 2018  PROVIDER:     Cristina Jackson MD    DATE OF PROCEDURE:  2018    PROCEDURE:  Abdominal paracentesis. RISKS VERSUS BENEFITS:  Assessed. INDICATION:  Symptomatic ascites consisting of pain and tachypnea. CONSENT:  Signed. BRIEF DESCRIPTION:  Area of the right lower quadrant was percussed  appropriately to assess the anatomical landmark for needle insertion. The  area was prepped and draped in normal sterile fashion. A 5 to 7 mL of 1%  lidocaine was utilized for local anesthetic purposes. The abdominal needle  was passed through the abdominal wall aspirating as advancement was  appreciated. A yellow-to-clear colored fluid was observed. Catheter was  advanced. Needle was removed and 4 liters of straw-colored fluid was  removed from the patient's abdomen without difficulty. The patient  tolerated the procedure without difficulty. The patient said that she felt  much better and breathing was easier post procedure. Band-Aid was applied.         Lena Perales MD    D: 2018 10:04:46       T: 2018 10:30:34     [email or web address]  Job#: [id number]     Doc#: [id number]    CC:

## 2018-02-19 NOTE — PROGRESS NOTES
has ocassional paracentesis & her belly is sore. Pt reports she stopped taking her MVI, B12 Ca++ & other gastic bypass meds ~8 months ago. Pt reports no difficulty with chewing or swallowing. Pt has received 2 units of blood per RN. Hgb 7,  , PO4 2.2, Mg++ 1.7, BUN 2. Pt reports she will take Ensure Clear when diet starts  · Nutrition-Focused Physical Findings:    · Wound Type: None (documented)  · Current Nutrition Therapies:  · Oral Diet Orders: NPO   · Oral Diet intake: NPO  · Oral Nutrition Supplement (ONS) Orders:  (Plan Ensure Clear when diet is CL or more)  · ONS intake: NPO  · Anthropometric Measures:  · Ht: 5' 5\" (165.1 cm)   · Current Body Wt: 148 lb 5.9 oz (67.3 kg) (2/19 + 2 edema)  · Admission Body Wt: 153 lb (69.4 kg) (2/18 + 2 edema. Note pt with ascites & reports occasional  paracentesis)  · Usual Body Wt:  (Pt reports she weighed 235# before her gastric bypass in 2009. Per pt her recent UBW is 127# before she had all this fluid on . )  · Ideal Body Wt: 125 lb (56.7 kg), BMI Classification: BMI 18.5 - 24.9 Normal Weight (24.7)  · Comparative Standards (Estimated Nutrition Needs):  · Estimated Daily Total Kcal: ~1884 kcals ( 28 kcals/kg on 2/19 wt 67.3 kg)  · Estimated Daily Protein (g): ~67 grams protein ( 1 gram protein/kg on 2/19 wt of 67.3 kg)  Monitor hepatic status. Estimated Intake vs Estimated Needs: Intake Less Than Needs    Nutrition Risk Level: High    Nutrition Interventions:    (Diet per Dr)  Continued Inpatient Monitoring, Education Initiated (when able po discussed need for adequate protein & healthy food choices. Per pt fills up easily & recommend small frequent healthy meals when diet restarts. )    Nutrition Evaluation:   · Evaluation: Goals set   · Goals: adequate nutrition within 1-4 days    · Monitoring: Diet Progression, NPO Status, Ascites/Edema, Weight, Pertinent Labs    See Adult Nutrition Doc Flowsheet for more detail.      Electronically signed by Jovani Topete

## 2018-02-19 NOTE — H&P
to auscultation bilaterally without wheeze or rhonchi. ABDOMEN:  Distended. Hypoactive bowel sounds. Nontender to deep  palpation. EXTREMITIES:  No lower extremity edema. NEUROLOGIC:  Grossly intact. No appreciable deficits. LABORATORY DATA:  Imaging reviewed. ASSESSMENT AND PLAN:  1. Hematemesis secondary to liver cirrhosis, status post surgical clips,  hemostasis observed post procedure. We will continue with somatostatin and  Protonix strips per GI. We will also keep the patient n.p.o. for the next  48 hours. We will have reassessment from GI to either proceed or hold  nutrition. 2.  Abdominal ascites secondary to cirrhotic liver secondary to alcohol  consumption. We will proceed with abdominal paracentesis secondary to  symptomatic tachypnea. We will continue empiric coverage per GI. 3.  Alcohol abuse, we will place the patient on CIWA protocol with a banana  bag and treat accordingly.         Nadine Bellamy MD    D: 02/18/2018 16:14:57       T: 02/18/2018 17:18:22     [email or web address]  Job#: [id number]     Doc#: [id number]    CC:

## 2018-02-19 NOTE — PROGRESS NOTES
6680 Called morning hgb to Dr. Adrian Lebron. Updated on all labs and assessment. 2 units PRBC ordered.
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
## 2018-02-19 NOTE — FLOWSHEET NOTE
600ml peritoneal fluid aspirated using sterile technique by Dr Bill Sousa. Dry dressing place over site right abdomen. Pt tolerated procedure well.

## 2018-02-20 LAB
ALBUMIN FLUID: < 0.2 GM/DL
AMMONIA: 161 UMOL/L (ref 11–60)
ANION GAP SERPL CALCULATED.3IONS-SCNC: 12 MEQ/L (ref 8–16)
ANISOCYTOSIS: ABNORMAL
BASOPHILS # BLD: 3.7 %
BASOPHILS ABSOLUTE: 0.1 THOU/MM3 (ref 0–0.1)
BUN BLDV-MCNC: 3 MG/DL (ref 7–22)
CALCIUM SERPL-MCNC: 7.7 MG/DL (ref 8.5–10.5)
CHARACTER, BODY FLUID: ABNORMAL
CHLORIDE BLD-SCNC: 102 MEQ/L (ref 98–111)
CO2: 22 MEQ/L (ref 23–33)
COLOR: YELLOW
CREAT SERPL-MCNC: 0.3 MG/DL (ref 0.4–1.2)
EOSINOPHIL # BLD: 1.1 %
EOSINOPHILS ABSOLUTE: 0 THOU/MM3 (ref 0–0.4)
GFR SERPL CREATININE-BSD FRML MDRD: > 90 ML/MIN/1.73M2
GLUCOSE BLD-MCNC: 114 MG/DL (ref 70–108)
HCT VFR BLD CALC: 31 % (ref 37–47)
HEMOGLOBIN: 10.5 GM/DL (ref 12–16)
LYMPHOCYTES # BLD: 17.6 %
LYMPHOCYTES ABSOLUTE: 0.7 THOU/MM3 (ref 1–4.8)
LYMPHOCYTES, BODY FLUID: 0 % (ref 25–100)
MAGNESIUM: 1.6 MG/DL (ref 1.6–2.4)
MCH RBC QN AUTO: 33.5 PG (ref 27–31)
MCHC RBC AUTO-ENTMCNC: 33.8 GM/DL (ref 33–37)
MCV RBC AUTO: 99 FL (ref 81–99)
MONOCYTE, FLUID: 0 % (ref 25–100)
MONOCYTES # BLD: 9.9 %
MONOCYTES ABSOLUTE: 0.4 THOU/MM3 (ref 0.4–1.3)
MRSA SCREEN: NORMAL
NUCLEATED RED BLOOD CELLS: 0 /100 WBC
PDW BLD-RTO: 16.1 % (ref 11.5–14.5)
PHOSPHORUS: 2.3 MG/DL (ref 2.4–4.7)
PLATELET # BLD: 97 THOU/MM3 (ref 130–400)
PMV BLD AUTO: 9.5 FL (ref 7.4–10.4)
POTASSIUM SERPL-SCNC: 4 MEQ/L (ref 3.5–5.2)
RBC # BLD: 3.13 MILL/MM3 (ref 4.2–5.4)
RBC FLUID: 5200 /CUMM (ref 0–100)
SEG NEUTROPHILS: 67.7 %
SEGMENTED NEUTROPHILS ABSOLUTE COUNT: 2.6 THOU/MM3 (ref 1.8–7.7)
SEGMENTED NEUTROPHILS, BODY FLUID: 0 % (ref 0–25)
SODIUM BLD-SCNC: 136 MEQ/L (ref 135–145)
SPECIMEN: ABNORMAL
WBC # BLD: 3.8 THOU/MM3 (ref 4.8–10.8)
WBC FLUID: 185 /MM3 (ref 0–500)

## 2018-02-20 PROCEDURE — C1751 CATH, INF, PER/CENT/MIDLINE: HCPCS

## 2018-02-20 PROCEDURE — 6360000002 HC RX W HCPCS: Performed by: INTERNAL MEDICINE

## 2018-02-20 PROCEDURE — 80048 BASIC METABOLIC PNL TOTAL CA: CPT

## 2018-02-20 PROCEDURE — 51798 US URINE CAPACITY MEASURE: CPT

## 2018-02-20 PROCEDURE — 99291 CRITICAL CARE FIRST HOUR: CPT | Performed by: INTERNAL MEDICINE

## 2018-02-20 PROCEDURE — 84100 ASSAY OF PHOSPHORUS: CPT

## 2018-02-20 PROCEDURE — 83735 ASSAY OF MAGNESIUM: CPT

## 2018-02-20 PROCEDURE — 82140 ASSAY OF AMMONIA: CPT

## 2018-02-20 PROCEDURE — 6370000000 HC RX 637 (ALT 250 FOR IP): Performed by: INTERNAL MEDICINE

## 2018-02-20 PROCEDURE — 51702 INSERT TEMP BLADDER CATH: CPT

## 2018-02-20 PROCEDURE — 2580000003 HC RX 258: Performed by: INTERNAL MEDICINE

## 2018-02-20 PROCEDURE — 85025 COMPLETE CBC W/AUTO DIFF WBC: CPT

## 2018-02-20 PROCEDURE — 6360000002 HC RX W HCPCS: Performed by: FAMILY MEDICINE

## 2018-02-20 PROCEDURE — 87086 URINE CULTURE/COLONY COUNT: CPT

## 2018-02-20 PROCEDURE — 36415 COLL VENOUS BLD VENIPUNCTURE: CPT

## 2018-02-20 PROCEDURE — 2580000003 HC RX 258: Performed by: FAMILY MEDICINE

## 2018-02-20 PROCEDURE — 2500000003 HC RX 250 WO HCPCS: Performed by: INTERNAL MEDICINE

## 2018-02-20 PROCEDURE — 2000000000 HC ICU R&B

## 2018-02-20 PROCEDURE — C9113 INJ PANTOPRAZOLE SODIUM, VIA: HCPCS | Performed by: INTERNAL MEDICINE

## 2018-02-20 PROCEDURE — C9113 INJ PANTOPRAZOLE SODIUM, VIA: HCPCS | Performed by: FAMILY MEDICINE

## 2018-02-20 RX ORDER — PANTOPRAZOLE SODIUM 40 MG/10ML
40 INJECTION, POWDER, LYOPHILIZED, FOR SOLUTION INTRAVENOUS 3 TIMES DAILY
Status: DISCONTINUED | OUTPATIENT
Start: 2018-02-20 | End: 2018-02-21

## 2018-02-20 RX ORDER — FOLIC ACID 1 MG/1
1 TABLET ORAL DAILY
Status: DISCONTINUED | OUTPATIENT
Start: 2018-02-20 | End: 2018-02-24 | Stop reason: HOSPADM

## 2018-02-20 RX ORDER — PEDIATRIC MULTIPLE VITAMIN LIQ
5 LIQUID ORAL DAILY
Status: DISCONTINUED | OUTPATIENT
Start: 2018-02-20 | End: 2018-02-20

## 2018-02-20 RX ORDER — LACTULOSE 10 G/15ML
20 SOLUTION ORAL 3 TIMES DAILY
Status: DISCONTINUED | OUTPATIENT
Start: 2018-02-20 | End: 2018-02-24 | Stop reason: HOSPADM

## 2018-02-20 RX ORDER — TRAMADOL HYDROCHLORIDE 50 MG/1
50 TABLET ORAL EVERY 6 HOURS PRN
Status: ON HOLD | COMMUNITY
End: 2018-02-24

## 2018-02-20 RX ORDER — ONDANSETRON 4 MG/1
4 TABLET, FILM COATED ORAL DAILY PRN
Status: ON HOLD | COMMUNITY
End: 2018-02-24

## 2018-02-20 RX ORDER — SUCRALFATE ORAL 1 G/10ML
1 SUSPENSION ORAL PRN
Status: ON HOLD | COMMUNITY
End: 2018-05-24

## 2018-02-20 RX ORDER — NADOLOL 20 MG/1
20 TABLET ORAL DAILY
Status: ON HOLD | COMMUNITY
End: 2019-04-23 | Stop reason: ALTCHOICE

## 2018-02-20 RX ORDER — THIAMINE MONONITRATE (VIT B1) 100 MG
100 TABLET ORAL DAILY
COMMUNITY
End: 2019-02-27 | Stop reason: CLARIF

## 2018-02-20 RX ORDER — QUETIAPINE FUMARATE 50 MG/1
50 TABLET, FILM COATED ORAL NIGHTLY PRN
Status: ON HOLD | COMMUNITY
End: 2019-01-22 | Stop reason: HOSPADM

## 2018-02-20 RX ORDER — LANOLIN ALCOHOL/MO/W.PET/CERES
1000 CREAM (GRAM) TOPICAL DAILY
Status: DISCONTINUED | OUTPATIENT
Start: 2018-02-20 | End: 2018-02-24 | Stop reason: HOSPADM

## 2018-02-20 RX ADMIN — Medication 0.7 MCG/KG/HR: at 00:44

## 2018-02-20 RX ADMIN — Medication 30 ML: at 21:30

## 2018-02-20 RX ADMIN — Medication 8 MG/HR: at 05:28

## 2018-02-20 RX ADMIN — Medication 0.7 MCG/KG/HR: at 10:28

## 2018-02-20 RX ADMIN — PANTOPRAZOLE SODIUM 40 MG: 40 INJECTION, POWDER, FOR SOLUTION INTRAVENOUS at 21:28

## 2018-02-20 RX ADMIN — Medication 8 MG/HR: at 15:54

## 2018-02-20 RX ADMIN — CEFTRIAXONE 1 G: 1 INJECTION, POWDER, FOR SOLUTION INTRAMUSCULAR; INTRAVENOUS at 12:47

## 2018-02-20 RX ADMIN — LACTULOSE 20 G: 20 SOLUTION ORAL at 21:31

## 2018-02-20 RX ADMIN — Medication 10 ML: at 10:31

## 2018-02-20 RX ADMIN — Medication 0.5 MCG/KG/HR: at 20:44

## 2018-02-20 RX ADMIN — LORAZEPAM 2 MG: 2 INJECTION INTRAMUSCULAR; INTRAVENOUS at 03:09

## 2018-02-20 RX ADMIN — PHYTONADIONE 10 MG: 10 INJECTION, EMULSION INTRAMUSCULAR; INTRAVENOUS; SUBCUTANEOUS at 11:39

## 2018-02-20 RX ADMIN — Medication 10 ML: at 21:29

## 2018-02-20 RX ADMIN — Medication 50 MCG/HR: at 15:51

## 2018-02-20 RX ADMIN — RIFAXIMIN 200 MG: 200 TABLET ORAL at 21:31

## 2018-02-20 RX ADMIN — Medication 1000 MCG: at 21:30

## 2018-02-20 RX ADMIN — FOLIC ACID 1 MG: 1 TABLET ORAL at 21:30

## 2018-02-20 RX ADMIN — Medication 50 MCG/HR: at 05:28

## 2018-02-20 ASSESSMENT — PAIN SCALES - GENERAL: PAINLEVEL_OUTOF10: 0

## 2018-02-20 NOTE — PROGRESS NOTES
Gastrointestinal Progress Note      Patient:  Ayana [facility]  Unit/Bed:4D-06/006-A       YOB: 1980    MRN: [id number]                      Acct: [de-identified]     Admit date: 2/18/2018      Subjective:  Patient in ICU bed, RN / staff in room changing her bed sheets. Patient is lethargic but she is being medicated due to withdrawal issues. No family in room. No needs voiced by staff regarding GI perspective for the patient. Objective:       CBC:   Recent Labs      02/19/18   0420  02/19/18   1602  02/20/18   0520   WBC  3.1*  3.0*  3.8*   HGB  7.0*  9.9*  10.5*   PLT  91*  93*  97*     BMP:    Recent Labs      02/18/18   1616  02/19/18   0420  02/20/18   0520   NA  134*  137  136   K  4.5  4.5  4.0   CL  99  105  102   CO2  21*  24  22*   BUN  <2*  <2*  3*   CREATININE  0.2*  0.3*  0.3*   GLUCOSE  147*  91  114*     Calcium:  Recent Labs      02/20/18   0520   CALCIUM  7.7*     Ionized Calcium:No results for input(s): IONCA in the last 72 hours. Magnesium:  Recent Labs      02/20/18   0520   MG  1.6     Phosphorus:  Recent Labs      02/20/18   0520   PHOS  2.3*     INR:   Recent Labs      02/18/18   0830   INR  1.78*     Hepatic:   Recent Labs      02/18/18   1616  02/19/18   1602   ALKPHOS  276*   --    ALT  24   --    AST  103*   --    PROT  5.2*   --    BILITOT  3.5*   --    LABALBU  2.2*  3.0*     Amylase and Lipase:  Recent Labs      02/18/18   1814   LACTA  3.4*     Lactic Acid:   Recent Labs      02/18/18   1814   LACTA  3.4*             Physical Exam:  Vitals:    02/20/18 0700   BP: (!) 129/100   Pulse: 67   Resp: 16   Temp:    SpO2:      GEN: Well nourished, well developed. LUNGS:  Clear to auscultation bilaterally. Chest rises equally on inspiration. CARDIOVASCULAR:  Regular rate and rhythm without murmurs, rubs or gallops. ABDOMEN:  Soft, nontender and nondistended with normal bowel sounds. HEAD:  Normal cephalic/atraumatic. NECK:  Neck supple.  Trachea midline      UPPER

## 2018-02-20 NOTE — PROGRESS NOTES
ICU NOTE     Past 24 hours:  Diagnostic paracentesis with 400ml of serosang fluid removal, hallucination     Admitted to ICU for variocele bleed s/p EGD with clips  2/18 Therapeutic Paracentesis with 4L removal      PHYSICAL EXAMINATION:  VITAL SIGNS:  Reviewed. GENERAL: hallucinating   HEENT:  PERRL  CARDIOVASCULAR:  RRs1s2- tachy  LUNGS:  CCTAB  ABDOMEN:  Distended- grossly improved post paracentesis.  Hypoactive bowel sounds.  Nontender to deep  palpation. EXTREMITIES: dp/pt 2+  NEUROLOGIC:  Grossly intact.  No appreciable deficits.     LABORATORY DATA:  Imaging reviewed.     ASSESSMENT AND PLAN:  1.  Hematemesis secondary to liver cirrhosis, status post surgical clips, hemostasis observed post procedure. continue somatostatin/ Protonix gtts per GI.       reassessment from GI to either proceed or hold nutrition. Variocele bleed high risk for infection- cont empiric abx for 5 days     2.  Abdominal ascites secondary to cirrhotic liver secondary to alcohol   Ascitic fluid protein <2.5g thus ddx consist of cirrhosis/liver failure or nephrotic syndrome    Does not meet criteria for nephrotic synd- thus despite not obtaining SAAG, the nidus of pt abdominal ascites is  liver cirrhosis    3.  Alcohol abuse, we will place the patient on CIWA protocol    Will add ETOH to pts diet, cont with precedex     4.  Anemia- ml 2/2 to initial upper GI bleed- stable     CC time 45 min  David Orellana MD

## 2018-02-20 NOTE — PROCEDURES
[address] [place] [address]                                  PROCEDURE NOTE    PATIENT NAME: Ezra Mckoy                       :        1980  MED REC NO:   [id number]                           ROOM:       0006  ACCOUNT NO:   [de-identified]                           ADMIT DATE: 2018  PROVIDER:     Erik Simon MD    DATE OF PROCEDURE:  2018    PROCEDURE:  Diagnostic abdominal paracentesis. SURGEON:  Erik Simon MD.    INDICATION:  Diagnostic. BRIEF PROCEDURAL COURSE:  The same area in the right lower quadrant was  utilized for this procedure; 5 mL of 1% lidocaine was utilized for local  anesthetic purposes. The abdominal paracentesis needle was inserted in the  same site as 2018 abdominal therapeutic paracentesis yielding  yellow-tinged fluid on aspiration. The catheter was advanced. Needle was  removed and roughly 400 mL of straw-colored fluid was removed. Three test  tubes were filled and sent for diagnostic purposes. The patient tolerated  the procedure without difficulty, again adding that she felt a lot better  post procedure. Band-Aid was placed.         Dana Romo MD    D: 2018 15:37:48       T: 2018 16:00:54     [email or web address]  Job#: [id number]     Doc#: [id number]    CC:

## 2018-02-21 LAB
ANION GAP SERPL CALCULATED.3IONS-SCNC: 10 MEQ/L (ref 8–16)
BUN BLDV-MCNC: < 2 MG/DL (ref 7–22)
CALCIUM SERPL-MCNC: 7.7 MG/DL (ref 8.5–10.5)
CHLORIDE BLD-SCNC: 104 MEQ/L (ref 98–111)
CO2: 25 MEQ/L (ref 23–33)
CREAT SERPL-MCNC: < 0.2 MG/DL (ref 0.4–1.2)
GFR SERPL CREATININE-BSD FRML MDRD: > 90 ML/MIN/1.73M2
GLUCOSE BLD-MCNC: 128 MG/DL (ref 70–108)
HCT VFR BLD CALC: 32.7 % (ref 37–47)
HEMOGLOBIN: 11.2 GM/DL (ref 12–16)
MAGNESIUM: 1.5 MG/DL (ref 1.6–2.4)
MCH RBC QN AUTO: 33.7 PG (ref 27–31)
MCHC RBC AUTO-ENTMCNC: 34.3 GM/DL (ref 33–37)
MCV RBC AUTO: 98.1 FL (ref 81–99)
ORGANISM: ABNORMAL
PDW BLD-RTO: 15.8 % (ref 11.5–14.5)
PHOSPHORUS: 2.1 MG/DL (ref 2.4–4.7)
PLATELET # BLD: 111 THOU/MM3 (ref 130–400)
PMV BLD AUTO: 9.6 FL (ref 7.4–10.4)
POTASSIUM SERPL-SCNC: 3.7 MEQ/L (ref 3.5–5.2)
RBC # BLD: 3.34 MILL/MM3 (ref 4.2–5.4)
SODIUM BLD-SCNC: 139 MEQ/L (ref 135–145)
VRE CULTURE: ABNORMAL
WBC # BLD: 4.6 THOU/MM3 (ref 4.8–10.8)

## 2018-02-21 PROCEDURE — 2580000003 HC RX 258: Performed by: INTERNAL MEDICINE

## 2018-02-21 PROCEDURE — 99291 CRITICAL CARE FIRST HOUR: CPT | Performed by: INTERNAL MEDICINE

## 2018-02-21 PROCEDURE — 83735 ASSAY OF MAGNESIUM: CPT

## 2018-02-21 PROCEDURE — 6360000002 HC RX W HCPCS: Performed by: INTERNAL MEDICINE

## 2018-02-21 PROCEDURE — 85027 COMPLETE CBC AUTOMATED: CPT

## 2018-02-21 PROCEDURE — 6370000000 HC RX 637 (ALT 250 FOR IP): Performed by: INTERNAL MEDICINE

## 2018-02-21 PROCEDURE — 2700000000 HC OXYGEN THERAPY PER DAY

## 2018-02-21 PROCEDURE — 6360000002 HC RX W HCPCS

## 2018-02-21 PROCEDURE — 6370000000 HC RX 637 (ALT 250 FOR IP): Performed by: NURSE PRACTITIONER

## 2018-02-21 PROCEDURE — 6360000002 HC RX W HCPCS: Performed by: FAMILY MEDICINE

## 2018-02-21 PROCEDURE — C9113 INJ PANTOPRAZOLE SODIUM, VIA: HCPCS | Performed by: INTERNAL MEDICINE

## 2018-02-21 PROCEDURE — 36415 COLL VENOUS BLD VENIPUNCTURE: CPT

## 2018-02-21 PROCEDURE — 2060000000 HC ICU INTERMEDIATE R&B

## 2018-02-21 PROCEDURE — 80048 BASIC METABOLIC PNL TOTAL CA: CPT

## 2018-02-21 PROCEDURE — 84100 ASSAY OF PHOSPHORUS: CPT

## 2018-02-21 RX ORDER — MAGNESIUM SULFATE IN WATER 40 MG/ML
2 INJECTION, SOLUTION INTRAVENOUS ONCE
Status: COMPLETED | OUTPATIENT
Start: 2018-02-21 | End: 2018-02-21

## 2018-02-21 RX ORDER — QUETIAPINE FUMARATE 100 MG/1
100 TABLET, FILM COATED ORAL NIGHTLY
Status: DISCONTINUED | OUTPATIENT
Start: 2018-02-21 | End: 2018-02-24 | Stop reason: HOSPADM

## 2018-02-21 RX ORDER — ESCITALOPRAM OXALATE 10 MG/1
10 TABLET ORAL DAILY
Status: DISCONTINUED | OUTPATIENT
Start: 2018-02-21 | End: 2018-02-24 | Stop reason: HOSPADM

## 2018-02-21 RX ORDER — MORPHINE SULFATE 2 MG/ML
INJECTION, SOLUTION INTRAMUSCULAR; INTRAVENOUS
Status: COMPLETED
Start: 2018-02-21 | End: 2018-02-21

## 2018-02-21 RX ORDER — LEVOTHYROXINE SODIUM 88 UG/1
88 TABLET ORAL DAILY
Status: DISCONTINUED | OUTPATIENT
Start: 2018-02-22 | End: 2018-02-24 | Stop reason: HOSPADM

## 2018-02-21 RX ORDER — THIAMINE HCL 50 MG
100 TABLET ORAL DAILY
Status: DISCONTINUED | OUTPATIENT
Start: 2018-02-21 | End: 2018-02-24 | Stop reason: HOSPADM

## 2018-02-21 RX ORDER — PANTOPRAZOLE SODIUM 40 MG/1
40 TABLET, DELAYED RELEASE ORAL
Status: DISCONTINUED | OUTPATIENT
Start: 2018-02-21 | End: 2018-02-24 | Stop reason: HOSPADM

## 2018-02-21 RX ORDER — TRAMADOL HYDROCHLORIDE 50 MG/1
50 TABLET ORAL EVERY 6 HOURS PRN
Status: DISCONTINUED | OUTPATIENT
Start: 2018-02-21 | End: 2018-02-24 | Stop reason: HOSPADM

## 2018-02-21 RX ORDER — M-VIT,TX,IRON,MINS/CALC/FOLIC 27MG-0.4MG
1 TABLET ORAL DAILY
Status: DISCONTINUED | OUTPATIENT
Start: 2018-02-22 | End: 2018-02-24 | Stop reason: HOSPADM

## 2018-02-21 RX ORDER — ONDANSETRON 2 MG/ML
4 INJECTION INTRAMUSCULAR; INTRAVENOUS EVERY 6 HOURS PRN
Status: DISCONTINUED | OUTPATIENT
Start: 2018-02-21 | End: 2018-02-24 | Stop reason: HOSPADM

## 2018-02-21 RX ADMIN — Medication 10 ML: at 21:09

## 2018-02-21 RX ADMIN — PANTOPRAZOLE SODIUM 40 MG: 40 INJECTION, POWDER, FOR SOLUTION INTRAVENOUS at 05:20

## 2018-02-21 RX ADMIN — POTASSIUM & SODIUM PHOSPHATES POWDER PACK 280-160-250 MG 250 MG: 280-160-250 PACK at 21:11

## 2018-02-21 RX ADMIN — CEFTRIAXONE 1 G: 1 INJECTION, POWDER, FOR SOLUTION INTRAMUSCULAR; INTRAVENOUS at 12:33

## 2018-02-21 RX ADMIN — LACTULOSE 20 G: 20 SOLUTION ORAL at 21:09

## 2018-02-21 RX ADMIN — POTASSIUM & SODIUM PHOSPHATES POWDER PACK 280-160-250 MG 250 MG: 280-160-250 PACK at 14:34

## 2018-02-21 RX ADMIN — RIFAXIMIN 550 MG: 550 TABLET ORAL at 09:43

## 2018-02-21 RX ADMIN — PANTOPRAZOLE SODIUM 40 MG: 40 TABLET, DELAYED RELEASE ORAL at 16:29

## 2018-02-21 RX ADMIN — Medication 50 MCG/HR: at 02:14

## 2018-02-21 RX ADMIN — POTASSIUM & SODIUM PHOSPHATES POWDER PACK 280-160-250 MG 250 MG: 280-160-250 PACK at 17:52

## 2018-02-21 RX ADMIN — Medication 30 ML: at 10:00

## 2018-02-21 RX ADMIN — RIFAXIMIN 550 MG: 550 TABLET ORAL at 21:10

## 2018-02-21 RX ADMIN — MAGNESIUM SULFATE HEPTAHYDRATE 2 G: 40 INJECTION, SOLUTION INTRAVENOUS at 09:43

## 2018-02-21 RX ADMIN — ONDANSETRON 4 MG: 2 INJECTION INTRAMUSCULAR; INTRAVENOUS at 21:29

## 2018-02-21 RX ADMIN — Medication 10 ML: at 10:00

## 2018-02-21 RX ADMIN — MORPHINE SULFATE 2 MG: 2 INJECTION, SOLUTION INTRAMUSCULAR; INTRAVENOUS at 21:11

## 2018-02-21 RX ADMIN — FOLIC ACID 1 MG: 1 TABLET ORAL at 09:43

## 2018-02-21 RX ADMIN — ESCITALOPRAM 10 MG: 10 TABLET, FILM COATED ORAL at 14:41

## 2018-02-21 RX ADMIN — LACTULOSE 20 G: 20 SOLUTION ORAL at 14:36

## 2018-02-21 RX ADMIN — Medication 50 MCG/HR: at 14:31

## 2018-02-21 RX ADMIN — LACTULOSE 20 G: 20 SOLUTION ORAL at 09:43

## 2018-02-21 RX ADMIN — TRAMADOL HYDROCHLORIDE 50 MG: 50 TABLET, FILM COATED ORAL at 17:59

## 2018-02-21 RX ADMIN — THIAMINE HCL (VITAMIN B1) 50 MG TABLET 100 MG: at 14:41

## 2018-02-21 RX ADMIN — LORAZEPAM 1 MG: 2 INJECTION INTRAMUSCULAR; INTRAVENOUS at 05:20

## 2018-02-21 RX ADMIN — Medication 1000 MCG: at 09:43

## 2018-02-21 ASSESSMENT — PAIN DESCRIPTION - ORIENTATION: ORIENTATION: UPPER;MID

## 2018-02-21 ASSESSMENT — PAIN SCALES - GENERAL
PAINLEVEL_OUTOF10: 6
PAINLEVEL_OUTOF10: 9
PAINLEVEL_OUTOF10: 8
PAINLEVEL_OUTOF10: 3
PAINLEVEL_OUTOF10: 0
PAINLEVEL_OUTOF10: 6
PAINLEVEL_OUTOF10: 2

## 2018-02-21 ASSESSMENT — LIFESTYLE VARIABLES: HISTORY_ALCOHOL_USE: YES

## 2018-02-21 ASSESSMENT — PAIN DESCRIPTION - PROGRESSION: CLINICAL_PROGRESSION: NOT CHANGED

## 2018-02-21 ASSESSMENT — PATIENT HEALTH QUESTIONNAIRE - PHQ9: SUM OF ALL RESPONSES TO PHQ QUESTIONS 1-9: 16

## 2018-02-21 ASSESSMENT — PAIN DESCRIPTION - FREQUENCY: FREQUENCY: CONTINUOUS

## 2018-02-21 ASSESSMENT — PAIN DESCRIPTION - PAIN TYPE
TYPE: ACUTE PAIN
TYPE: CHRONIC PAIN

## 2018-02-21 ASSESSMENT — PAIN DESCRIPTION - ONSET: ONSET: ON-GOING

## 2018-02-21 ASSESSMENT — PAIN DESCRIPTION - LOCATION
LOCATION: ABDOMEN
LOCATION: ABDOMEN
LOCATION: ABDOMEN;BACK
LOCATION: ABDOMEN;TOE (COMMENT WHICH ONE)

## 2018-02-21 ASSESSMENT — PAIN DESCRIPTION - DESCRIPTORS
DESCRIPTORS: DISCOMFORT
DESCRIPTORS: DULL;ACHING

## 2018-02-21 NOTE — PROGRESS NOTES
ICU NOTE     Past 24 hours:  Hallucinations that have since resolved    2/19 Diagnostic paracentesis with 400ml of serosang fluid removal, hallucination      Admitted to ICU for variocele bleed s/p EGD with clips  2/18 Therapeutic Paracentesis with 4L removal      PHYSICAL EXAMINATION:  VITAL SIGNS:  Reviewed. GENERAL: NAD, A &O  HEENT:  PERRL  CARDIOVASCULAR:  VUR7K7  LUNGS:  CCTAB  ABDOMEN:  Distended- grossly improved post paracentesis.  Hypoactive bowel sounds.  Nontender to deep  palpation. EXTREMITIES: dp/pt 2+  NEUROLOGIC:  Grossly intact.  No appreciable deficits.     LABORATORY DATA:  Imaging reviewed.     ASSESSMENT AND PLAN:  1.  Hematemesis secondary to liver cirrhosis, status post surgical clips, hemostasis observed post procedure. continue somatostatin/ Protonix gtts per GI.                reassessment from GI to either proceed or hold nutrition. Variocele bleed high risk for infection- cont empiric abx for 5 days     2.  Abdominal ascites secondary to cirrhotic liver secondary to alcohol              Ascitic fluid protein <2.5g thus ddx consist of cirrhosis/liver failure or nephrotic syndrome               Does not meet criteria for nephrotic synd- thus despite not obtaining SAAG, the nidus of pt abdominal ascites is        liver cirrhosis with hyperammoniemia- restarted home lactulose with the addition of rifaximin      3.  Alcohol abuse, we will place the patient on CIWA protocol               Cont ETOH with diet, cont with precedex     4.  Anemia acute blood loss- ml 2/2 to initial upper GI bleed- stable     CC time 40 min  MD Dr. Lala Dale MD accepted pt on 2/21 at [phone]

## 2018-02-22 LAB
AMMONIA: 73 UMOL/L (ref 11–60)
HCT VFR BLD CALC: 34.3 % (ref 37–47)
HEMOGLOBIN: 11.5 GM/DL (ref 12–16)
MAGNESIUM: 1.9 MG/DL (ref 1.6–2.4)
MCH RBC QN AUTO: 33.4 PG (ref 27–31)
MCHC RBC AUTO-ENTMCNC: 33.6 GM/DL (ref 33–37)
MCV RBC AUTO: 99.5 FL (ref 81–99)
PDW BLD-RTO: 16.2 % (ref 11.5–14.5)
PHOSPHORUS: 2.8 MG/DL (ref 2.4–4.7)
PLATELET # BLD: 134 THOU/MM3 (ref 130–400)
PMV BLD AUTO: 9.4 FL (ref 7.4–10.4)
RBC # BLD: 3.45 MILL/MM3 (ref 4.2–5.4)
URINE CULTURE, ROUTINE: NORMAL
WBC # BLD: 7.4 THOU/MM3 (ref 4.8–10.8)

## 2018-02-22 PROCEDURE — 6360000002 HC RX W HCPCS: Performed by: FAMILY MEDICINE

## 2018-02-22 PROCEDURE — 6370000000 HC RX 637 (ALT 250 FOR IP): Performed by: NURSE PRACTITIONER

## 2018-02-22 PROCEDURE — 84100 ASSAY OF PHOSPHORUS: CPT

## 2018-02-22 PROCEDURE — 6360000002 HC RX W HCPCS: Performed by: INTERNAL MEDICINE

## 2018-02-22 PROCEDURE — 2580000003 HC RX 258: Performed by: INTERNAL MEDICINE

## 2018-02-22 PROCEDURE — 36415 COLL VENOUS BLD VENIPUNCTURE: CPT

## 2018-02-22 PROCEDURE — 6370000000 HC RX 637 (ALT 250 FOR IP): Performed by: INTERNAL MEDICINE

## 2018-02-22 PROCEDURE — 83735 ASSAY OF MAGNESIUM: CPT

## 2018-02-22 PROCEDURE — 2060000000 HC ICU INTERMEDIATE R&B

## 2018-02-22 PROCEDURE — 99232 SBSQ HOSP IP/OBS MODERATE 35: CPT | Performed by: INTERNAL MEDICINE

## 2018-02-22 PROCEDURE — 6360000002 HC RX W HCPCS

## 2018-02-22 PROCEDURE — 82140 ASSAY OF AMMONIA: CPT

## 2018-02-22 PROCEDURE — 85027 COMPLETE CBC AUTOMATED: CPT

## 2018-02-22 RX ORDER — MORPHINE SULFATE 2 MG/ML
INJECTION, SOLUTION INTRAMUSCULAR; INTRAVENOUS
Status: COMPLETED
Start: 2018-02-22 | End: 2018-02-22

## 2018-02-22 RX ORDER — MORPHINE SULFATE 2 MG/ML
2 INJECTION, SOLUTION INTRAMUSCULAR; INTRAVENOUS EVERY 4 HOURS PRN
Status: DISPENSED | OUTPATIENT
Start: 2018-02-22 | End: 2018-02-23

## 2018-02-22 RX ORDER — SPIRONOLACTONE 25 MG/1
50 TABLET ORAL 2 TIMES DAILY
Status: DISCONTINUED | OUTPATIENT
Start: 2018-02-22 | End: 2018-02-22

## 2018-02-22 RX ORDER — FUROSEMIDE 40 MG/1
40 TABLET ORAL
Status: DISCONTINUED | OUTPATIENT
Start: 2018-02-22 | End: 2018-02-24 | Stop reason: HOSPADM

## 2018-02-22 RX ORDER — SPIRONOLACTONE 25 MG/1
100 TABLET ORAL DAILY
Status: DISCONTINUED | OUTPATIENT
Start: 2018-02-23 | End: 2018-02-23

## 2018-02-22 RX ADMIN — TRAMADOL HYDROCHLORIDE 50 MG: 50 TABLET, FILM COATED ORAL at 00:50

## 2018-02-22 RX ADMIN — SPIRONOLACTONE 50 MG: 25 TABLET, FILM COATED ORAL at 11:19

## 2018-02-22 RX ADMIN — LACTULOSE 20 G: 20 SOLUTION ORAL at 09:46

## 2018-02-22 RX ADMIN — Medication 50 MCG/HR: at 00:22

## 2018-02-22 RX ADMIN — MORPHINE SULFATE 2 MG: 2 INJECTION, SOLUTION INTRAMUSCULAR; INTRAVENOUS at 08:41

## 2018-02-22 RX ADMIN — LACTULOSE 20 G: 20 SOLUTION ORAL at 20:26

## 2018-02-22 RX ADMIN — MULTIPLE VITAMINS W/ MINERALS TAB 1 TABLET: TAB at 09:48

## 2018-02-22 RX ADMIN — POTASSIUM & SODIUM PHOSPHATES POWDER PACK 280-160-250 MG 250 MG: 280-160-250 PACK at 10:24

## 2018-02-22 RX ADMIN — ESCITALOPRAM 10 MG: 10 TABLET, FILM COATED ORAL at 09:46

## 2018-02-22 RX ADMIN — Medication 10 ML: at 10:08

## 2018-02-22 RX ADMIN — FOLIC ACID 1 MG: 1 TABLET ORAL at 09:46

## 2018-02-22 RX ADMIN — Medication 1000 MCG: at 09:48

## 2018-02-22 RX ADMIN — Medication 10 ML: at 20:26

## 2018-02-22 RX ADMIN — LACTULOSE 20 G: 20 SOLUTION ORAL at 15:56

## 2018-02-22 RX ADMIN — ONDANSETRON 4 MG: 2 INJECTION INTRAMUSCULAR; INTRAVENOUS at 11:44

## 2018-02-22 RX ADMIN — FUROSEMIDE 40 MG: 40 TABLET ORAL at 15:56

## 2018-02-22 RX ADMIN — MORPHINE SULFATE 2 MG: 2 INJECTION, SOLUTION INTRAMUSCULAR; INTRAVENOUS at 04:31

## 2018-02-22 RX ADMIN — POTASSIUM & SODIUM PHOSPHATES POWDER PACK 280-160-250 MG 250 MG: 280-160-250 PACK at 13:00

## 2018-02-22 RX ADMIN — RIFAXIMIN 550 MG: 550 TABLET ORAL at 09:47

## 2018-02-22 RX ADMIN — MORPHINE SULFATE 2 MG: 2 INJECTION, SOLUTION INTRAMUSCULAR; INTRAVENOUS at 18:28

## 2018-02-22 RX ADMIN — THIAMINE HCL (VITAMIN B1) 50 MG TABLET 100 MG: at 09:48

## 2018-02-22 RX ADMIN — Medication 10 ML: at 08:47

## 2018-02-22 RX ADMIN — CEFTRIAXONE 1 G: 1 INJECTION, POWDER, FOR SOLUTION INTRAMUSCULAR; INTRAVENOUS at 12:30

## 2018-02-22 RX ADMIN — RIFAXIMIN 550 MG: 550 TABLET ORAL at 20:26

## 2018-02-22 RX ADMIN — QUETIAPINE FUMARATE 100 MG: 100 TABLET ORAL at 20:26

## 2018-02-22 RX ADMIN — PANTOPRAZOLE SODIUM 40 MG: 40 TABLET, DELAYED RELEASE ORAL at 06:18

## 2018-02-22 RX ADMIN — PANTOPRAZOLE SODIUM 40 MG: 40 TABLET, DELAYED RELEASE ORAL at 15:56

## 2018-02-22 RX ADMIN — LEVOTHYROXINE SODIUM 88 MCG: 88 TABLET ORAL at 06:18

## 2018-02-22 RX ADMIN — POTASSIUM & SODIUM PHOSPHATES POWDER PACK 280-160-250 MG 250 MG: 280-160-250 PACK at 20:26

## 2018-02-22 ASSESSMENT — PAIN DESCRIPTION - ONSET
ONSET: ON-GOING

## 2018-02-22 ASSESSMENT — PAIN DESCRIPTION - LOCATION
LOCATION: ABDOMEN
LOCATION: ABDOMEN;CHEST
LOCATION: ABDOMEN
LOCATION: BACK;ABDOMEN
LOCATION: ABDOMEN

## 2018-02-22 ASSESSMENT — PAIN DESCRIPTION - FREQUENCY
FREQUENCY: CONTINUOUS

## 2018-02-22 ASSESSMENT — PAIN DESCRIPTION - ORIENTATION
ORIENTATION: UPPER;MID
ORIENTATION: MID;UPPER
ORIENTATION: UPPER;MID
ORIENTATION: UPPER;MID

## 2018-02-22 ASSESSMENT — PAIN SCALES - GENERAL
PAINLEVEL_OUTOF10: 8
PAINLEVEL_OUTOF10: 8
PAINLEVEL_OUTOF10: 6
PAINLEVEL_OUTOF10: 9
PAINLEVEL_OUTOF10: 8
PAINLEVEL_OUTOF10: 6
PAINLEVEL_OUTOF10: 0
PAINLEVEL_OUTOF10: 8
PAINLEVEL_OUTOF10: 9
PAINLEVEL_OUTOF10: 0
PAINLEVEL_OUTOF10: 9
PAINLEVEL_OUTOF10: 8
PAINLEVEL_OUTOF10: 7

## 2018-02-22 ASSESSMENT — PAIN DESCRIPTION - PAIN TYPE
TYPE: CHRONIC PAIN

## 2018-02-22 ASSESSMENT — PAIN DESCRIPTION - PROGRESSION
CLINICAL_PROGRESSION: NOT CHANGED

## 2018-02-22 ASSESSMENT — PAIN DESCRIPTION - DESCRIPTORS
DESCRIPTORS: ACHING
DESCRIPTORS: DULL;ACHING
DESCRIPTORS: ACHING;DULL
DESCRIPTORS: DULL;ACHING
DESCRIPTORS: DULL;ACHING

## 2018-02-22 NOTE — PROGRESS NOTES
Hospitalist Progress Note    Patient:  Darin Madrigal      Unit/Bed:4K-11/011-A    YOB: 1980    MRN: [id number]       Acct: [de-identified]     PCP: Bradley Joseph NP    Date of Admission: 2/18/2018    Chief Complaint: [facility] Course: Patient had EGD done which showed esophageal varices s/p banding. Patient also had paracentesis done. She is still confused. GI following. Subjective: Patient still confused.        Medications:  Reviewed    Infusion Medications    Scheduled Medications    furosemide  40 mg Oral Dinner    [START ON 2/23/2018] spironolactone  100 mg Oral Daily    rifaximin  550 mg Oral BID    pantoprazole  40 mg Oral BID AC    potassium & sodium phosphates  1 packet Oral 4x Daily    escitalopram  10 mg Oral Daily    QUEtiapine  100 mg Oral Nightly    levothyroxine  88 mcg Oral Daily    vitamin B-1  100 mg Oral Daily    therapeutic multivitamin-minerals  1 tablet Oral Daily    folic acid  1 mg Oral Daily    vitamin B-12  1,000 mcg Oral Daily    lactulose  20 g Oral TID    sodium chloride  250 mL Intravenous Once    cefTRIAXone (ROCEPHIN) IV  1 g Intravenous Q24H    sodium chloride flush  10 mL Intravenous 2 times per day     PRN Meds: morphine, traMADol, ondansetron, sodium chloride flush, LORazepam **OR** LORazepam **OR** LORazepam **OR** LORazepam **OR** LORazepam **OR** LORazepam **OR** LORazepam **OR** LORazepam, fentaNYL, midazolam, metoprolol      Intake/Output Summary (Last 24 hours) at 02/22/18 1411  Last data filed at 02/22/18 1307   Gross per 24 hour   Intake           956.17 ml   Output             1175 ml   Net          -218.83 ml       Diet:  Dietary Nutrition Supplements: Clear Liquid Oral Supplement  DIET DENTAL SOFT;    Exam:  /74   Pulse 110   Temp 98.3 °F (36.8 °C) (Oral)   Resp 16   Ht 5' 5\" (1.651 m)   Wt 144 lb (65.3 kg)   SpO2 91%   BMI 23.96 kg/m²     General appearance: Patient confused  HEENT: Pupils equal,

## 2018-02-22 NOTE — CARE COORDINATION
2/22/18, 10:31 AM      Doloris Free day: 4  Location: [facility]11/011A Reason for admit: Hematemesis [K92.0]  Generalized abdominal pain [R10.84]  Hematemesis with nausea [K92.0]  End stage liver disease ([facility] [address] [K72.90]   Procedure: 2/18 EGD: varices banding, Paracentesis = 4L removed, 2/19 Paracentesis = 400 ml removed  Treatment Plan of Care: from ICU prior, Ammonia 73 improved. Octreotide Gtt stopped. GI following.  AB/Lactulose continued  Core Measures: monitor  PCP: Gail Zaidi NP  Discharge Plan: plans home with spouse, Addiction Services following

## 2018-02-22 NOTE — CARE COORDINATION
02/22/18 7:26 AM    Pt transferred to 4K11.  Handoff report given to [facility]

## 2018-02-23 LAB
AMMONIA: 76 UMOL/L (ref 11–60)
ANION GAP SERPL CALCULATED.3IONS-SCNC: 11 MEQ/L (ref 8–16)
ANISOCYTOSIS: ABNORMAL
BASOPHILS # BLD: 0.8 %
BASOPHILS ABSOLUTE: 0.1 THOU/MM3 (ref 0–0.1)
BUN BLDV-MCNC: < 2 MG/DL (ref 7–22)
CALCIUM SERPL-MCNC: 7.3 MG/DL (ref 8.5–10.5)
CHLORIDE BLD-SCNC: 103 MEQ/L (ref 98–111)
CO2: 27 MEQ/L (ref 23–33)
CREAT SERPL-MCNC: < 0.2 MG/DL (ref 0.4–1.2)
EOSINOPHIL # BLD: 0.9 %
EOSINOPHILS ABSOLUTE: 0.1 THOU/MM3 (ref 0–0.4)
GFR SERPL CREATININE-BSD FRML MDRD: > 90 ML/MIN/1.73M2
GLUCOSE BLD-MCNC: 127 MG/DL (ref 70–108)
HCT VFR BLD CALC: 34.1 % (ref 37–47)
HEMOGLOBIN: 11.5 GM/DL (ref 12–16)
LYMPHOCYTES # BLD: 27.6 %
LYMPHOCYTES ABSOLUTE: 2.2 THOU/MM3 (ref 1–4.8)
MACROCYTES: ABNORMAL
MCH RBC QN AUTO: 33.9 PG (ref 27–31)
MCHC RBC AUTO-ENTMCNC: 33.9 GM/DL (ref 33–37)
MCV RBC AUTO: 100.1 FL (ref 81–99)
MONOCYTES # BLD: 9.6 %
MONOCYTES ABSOLUTE: 0.8 THOU/MM3 (ref 0.4–1.3)
NUCLEATED RED BLOOD CELLS: 0 /100 WBC
PDW BLD-RTO: 16.1 % (ref 11.5–14.5)
PLATELET # BLD: 130 THOU/MM3 (ref 130–400)
PMV BLD AUTO: 9.3 FL (ref 7.4–10.4)
POTASSIUM SERPL-SCNC: 3.3 MEQ/L (ref 3.5–5.2)
POTASSIUM SERPL-SCNC: 3.4 MEQ/L (ref 3.5–5.2)
RBC # BLD: 3.41 MILL/MM3 (ref 4.2–5.4)
SEG NEUTROPHILS: 61.1 %
SEGMENTED NEUTROPHILS ABSOLUTE COUNT: 4.9 THOU/MM3 (ref 1.8–7.7)
SODIUM BLD-SCNC: 141 MEQ/L (ref 135–145)
WBC # BLD: 8 THOU/MM3 (ref 4.8–10.8)

## 2018-02-23 PROCEDURE — 80048 BASIC METABOLIC PNL TOTAL CA: CPT

## 2018-02-23 PROCEDURE — 84132 ASSAY OF SERUM POTASSIUM: CPT

## 2018-02-23 PROCEDURE — 6370000000 HC RX 637 (ALT 250 FOR IP): Performed by: INTERNAL MEDICINE

## 2018-02-23 PROCEDURE — 6360000002 HC RX W HCPCS: Performed by: INTERNAL MEDICINE

## 2018-02-23 PROCEDURE — 82140 ASSAY OF AMMONIA: CPT

## 2018-02-23 PROCEDURE — 2580000003 HC RX 258: Performed by: INTERNAL MEDICINE

## 2018-02-23 PROCEDURE — 99232 SBSQ HOSP IP/OBS MODERATE 35: CPT | Performed by: INTERNAL MEDICINE

## 2018-02-23 PROCEDURE — 99221 1ST HOSP IP/OBS SF/LOW 40: CPT | Performed by: PHYSICIAN ASSISTANT

## 2018-02-23 PROCEDURE — 36415 COLL VENOUS BLD VENIPUNCTURE: CPT

## 2018-02-23 PROCEDURE — 85025 COMPLETE CBC W/AUTO DIFF WBC: CPT

## 2018-02-23 PROCEDURE — 2060000000 HC ICU INTERMEDIATE R&B

## 2018-02-23 PROCEDURE — 6370000000 HC RX 637 (ALT 250 FOR IP): Performed by: NURSE PRACTITIONER

## 2018-02-23 PROCEDURE — 6360000002 HC RX W HCPCS: Performed by: FAMILY MEDICINE

## 2018-02-23 RX ORDER — SPIRONOLACTONE 25 MG/1
200 TABLET ORAL DAILY
Status: DISCONTINUED | OUTPATIENT
Start: 2018-02-24 | End: 2018-02-24 | Stop reason: HOSPADM

## 2018-02-23 RX ORDER — POTASSIUM CHLORIDE 750 MG/1
40 TABLET, FILM COATED, EXTENDED RELEASE ORAL ONCE
Status: COMPLETED | OUTPATIENT
Start: 2018-02-23 | End: 2018-02-23

## 2018-02-23 RX ORDER — MORPHINE SULFATE 2 MG/ML
2 INJECTION, SOLUTION INTRAMUSCULAR; INTRAVENOUS EVERY 4 HOURS PRN
Status: DISPENSED | OUTPATIENT
Start: 2018-02-23 | End: 2018-02-24

## 2018-02-23 RX ADMIN — POTASSIUM CHLORIDE 40 MEQ: 750 TABLET, FILM COATED, EXTENDED RELEASE ORAL at 21:19

## 2018-02-23 RX ADMIN — SPIRONOLACTONE 100 MG: 25 TABLET, FILM COATED ORAL at 08:44

## 2018-02-23 RX ADMIN — FOLIC ACID 1 MG: 1 TABLET ORAL at 08:45

## 2018-02-23 RX ADMIN — Medication 1000 MCG: at 08:45

## 2018-02-23 RX ADMIN — PANTOPRAZOLE SODIUM 40 MG: 40 TABLET, DELAYED RELEASE ORAL at 08:46

## 2018-02-23 RX ADMIN — THIAMINE HCL (VITAMIN B1) 50 MG TABLET 100 MG: at 08:46

## 2018-02-23 RX ADMIN — POTASSIUM & SODIUM PHOSPHATES POWDER PACK 280-160-250 MG 250 MG: 280-160-250 PACK at 08:45

## 2018-02-23 RX ADMIN — MORPHINE SULFATE 2 MG: 2 INJECTION, SOLUTION INTRAMUSCULAR; INTRAVENOUS at 00:16

## 2018-02-23 RX ADMIN — LORAZEPAM 1 MG: 2 INJECTION INTRAMUSCULAR; INTRAVENOUS at 09:38

## 2018-02-23 RX ADMIN — FUROSEMIDE 40 MG: 40 TABLET ORAL at 16:17

## 2018-02-23 RX ADMIN — LACTULOSE 20 G: 20 SOLUTION ORAL at 14:15

## 2018-02-23 RX ADMIN — LACTULOSE 20 G: 20 SOLUTION ORAL at 21:20

## 2018-02-23 RX ADMIN — LACTULOSE 20 G: 20 SOLUTION ORAL at 08:47

## 2018-02-23 RX ADMIN — ONDANSETRON 4 MG: 2 INJECTION INTRAMUSCULAR; INTRAVENOUS at 09:43

## 2018-02-23 RX ADMIN — LEVOTHYROXINE SODIUM 88 MCG: 88 TABLET ORAL at 08:46

## 2018-02-23 RX ADMIN — Medication 10 ML: at 08:44

## 2018-02-23 RX ADMIN — MORPHINE SULFATE 2 MG: 2 INJECTION, SOLUTION INTRAMUSCULAR; INTRAVENOUS at 08:55

## 2018-02-23 RX ADMIN — MORPHINE SULFATE 2 MG: 2 INJECTION, SOLUTION INTRAMUSCULAR; INTRAVENOUS at 21:21

## 2018-02-23 RX ADMIN — Medication 10 ML: at 21:21

## 2018-02-23 RX ADMIN — ESCITALOPRAM 10 MG: 10 TABLET, FILM COATED ORAL at 08:45

## 2018-02-23 RX ADMIN — RIFAXIMIN 550 MG: 550 TABLET ORAL at 21:19

## 2018-02-23 RX ADMIN — MULTIPLE VITAMINS W/ MINERALS TAB 1 TABLET: TAB at 08:46

## 2018-02-23 RX ADMIN — PANTOPRAZOLE SODIUM 40 MG: 40 TABLET, DELAYED RELEASE ORAL at 16:17

## 2018-02-23 RX ADMIN — LORAZEPAM 1 MG: 1 TABLET ORAL at 02:25

## 2018-02-23 RX ADMIN — RIFAXIMIN 550 MG: 550 TABLET ORAL at 08:46

## 2018-02-23 ASSESSMENT — PAIN DESCRIPTION - ORIENTATION
ORIENTATION: MID;UPPER

## 2018-02-23 ASSESSMENT — PAIN SCALES - GENERAL
PAINLEVEL_OUTOF10: 5
PAINLEVEL_OUTOF10: 7
PAINLEVEL_OUTOF10: 6
PAINLEVEL_OUTOF10: 5
PAINLEVEL_OUTOF10: 7
PAINLEVEL_OUTOF10: 9
PAINLEVEL_OUTOF10: 3
PAINLEVEL_OUTOF10: 0
PAINLEVEL_OUTOF10: 7
PAINLEVEL_OUTOF10: 7

## 2018-02-23 ASSESSMENT — PAIN DESCRIPTION - LOCATION
LOCATION: ABDOMEN

## 2018-02-23 ASSESSMENT — PAIN DESCRIPTION - PAIN TYPE
TYPE: CHRONIC PAIN

## 2018-02-23 ASSESSMENT — PAIN DESCRIPTION - DESCRIPTORS: DESCRIPTORS: ACHING;CONSTANT

## 2018-02-23 ASSESSMENT — PAIN DESCRIPTION - FREQUENCY: FREQUENCY: CONTINUOUS

## 2018-02-23 NOTE — PROGRESS NOTES
prior to seeing the patient.    Note done in collaboration with DR Shonda Gongora MD  Electronically signed by Arvind Arnold CNP on 2/23/18 at 11:23 AM

## 2018-02-23 NOTE — PLAN OF CARE
Problem: Pain:  Goal: Pain level will decrease  Pain level will decrease   Outcome: Ongoing  Patient

## 2018-02-23 NOTE — PROGRESS NOTES
is 127# before she had all this fluid on . )  · Ideal Body Wt: 125 lb (56.7 kg),   · BMI Classification: BMI 18.5 - 24.9 Normal Weight  · Comparative Standards (Estimated Nutrition Needs):  · Estimated Daily Total Kcal: ~1884 kcals ( 28 kcals/kg on 2/19 wt 67.3 kg)  · Estimated Daily Protein (g): ~67 grams protein ( 1 gram protein/kg on 2/19 wt of 67.3 kg)  Monitor hepatic status. Estimated Intake vs Estimated Needs: Intake Less Than Needs    Nutrition Risk Level: Moderate    Nutrition Interventions:   Continue current diet, Continue current ONS  Continued Inpatient Monitoring, Education Initiated 2/21 (when able po discussed need for adequate protein & healthy food choices. Per pt fills up easily & recommend small frequent healthy meals when diet restarts. )    Nutrition Evaluation:   · Evaluation: Progressing toward goals   · Goals: 75% or more of meal intake during LOS    · Monitoring: Meal Intake, Supplement Intake, Diet Tolerance, Ascites/Edema, Weight, Pertinent Labs, Nausea or Vomiting    See Adult Nutrition Doc Flowsheet for more detail.      Electronically signed by Clary Lennox, RD, LD on 2/23/18 at 1:38 PM    Contact Number: [phone]

## 2018-02-23 NOTE — FLOWSHEET NOTE
02/23/18 1224   Provider Notification   Reason for Communication Evaluate   Provider Name Dr. Soumya Ron   Provider Notification Physician   Method of Communication Face to face   Response See orders     Dr. Soumya oRn on the floor.  Will see patient and add to list.

## 2018-02-23 NOTE — PROGRESS NOTES
Hospitalist Progress Note    Patient:  Leonard Mike      Unit/Bed:4K-11/011-A    YOB: 1980    MRN: [id number]       Acct: [de-identified]     PCP: Johana Solis NP    Date of Admission: 2/18/2018    Chief Complaint: [facility] Course: Patient had EGD done which showed esophageal varices s/p banding. Patient also had paracentesis done. Subjective: Patient seen and examined at bedside. Patient mentation better today.       Medications:  Reviewed    Infusion Medications    Scheduled Medications    [START ON 2/24/2018] influenza virus vaccine  0.5 mL Intramuscular Once    [START ON 2/24/2018] spironolactone  200 mg Oral Daily    furosemide  40 mg Oral Dinner    rifaximin  550 mg Oral BID    pantoprazole  40 mg Oral BID AC    escitalopram  10 mg Oral Daily    QUEtiapine  100 mg Oral Nightly    levothyroxine  88 mcg Oral Daily    vitamin B-1  100 mg Oral Daily    therapeutic multivitamin-minerals  1 tablet Oral Daily    folic acid  1 mg Oral Daily    vitamin B-12  1,000 mcg Oral Daily    lactulose  20 g Oral TID    sodium chloride  250 mL Intravenous Once    sodium chloride flush  10 mL Intravenous 2 times per day     PRN Meds: morphine, traMADol, ondansetron, sodium chloride flush, LORazepam **OR** LORazepam **OR** LORazepam **OR** LORazepam **OR** LORazepam **OR** LORazepam **OR** LORazepam **OR** LORazepam, fentaNYL, midazolam, metoprolol      Intake/Output Summary (Last 24 hours) at 02/23/18 1348  Last data filed at 02/23/18 1323   Gross per 24 hour   Intake           676.18 ml   Output             2125 ml   Net         -1448.82 ml       Diet:  DIET DENTAL SOFT;  Dietary Nutrition Supplements: Clear Liquid Oral Supplement    Exam:  BP (!) 133/98   Pulse 109   Temp 97.7 °F (36.5 °C) (Oral)   Resp 16   Ht 5' 5\" (1.651 m)   Wt 144 lb 9.6 oz (65.6 kg)   SpO2 94%   BMI 24.06 kg/m²     General appearance: Patient confused  HEENT: Pupils equal, round, and voice recognition technology. **      Final report electronically signed by Dr. Augustus Chris on 2/18/2018 9:38 AM      CT ABDOMEN PELVIS WO CONTRAST Additional Contrast? None   Final Result   1. There is a small amount of pericardial fluid along the anterior aspect of the heart measuring 0.8 cm in transverse dimension. 2. There is mild subsegmental atelectasis at the right posterior costophrenic angle and along the left major fissure. 3. There is fatty infiltration of the liver. 4. There is a large amount of ascites within the abdomen and pelvis. 5. There is diffuse mesenteric edema. 6. There is subcutaneous edema suggesting 3rd spacing. **This report has been created using voice recognition software. It may contain minor errors which are inherent in voice recognition technology. **      Final report electronically signed by Dr. Augustus Chris on 2/18/2018 9:37 AM          Diet: DIET DENTAL SOFT;  Dietary Nutrition Supplements: Clear Liquid Oral Supplement    DVT prophylaxis: [] Lovenox                                 [x] SCDs                                 [] SQ Heparin                                 [] Encourage ambulation           [] Already on Anticoagulation     Disposition:    [x] Home       [] TCU       [] Rehab       [] Psych       [] SNF       [] [place]       [] Other-    Code Status: Full Code    PT/OT Eval Status:     Assessment/Plan:    Anticipated Discharge in : once encephalopathy improves    Active Hospital Problems    Diagnosis Date Noted    [facility] [E44.0] 02/19/2018     Class: Chronic    Hematemesis [K92.0] 02/18/2018    Essential hypertension [I10] 08/25/2016    Bipolar disorder ([facility] [address] [F31.9] 08/25/2016    Hypothyroidism [E03.9] 67/86/2784    Alcoholic cirrhosis ([facility] [address] [Y29.24] 04/17/2015       1. Hematemesis secondary to GI bleed s/p EGD with variceal banding. GI following  2.  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

## 2018-02-23 NOTE — CARE COORDINATION
2/23/18, 12:18 PM  Plans home with spouse when medically cleared  Discharge plan discussed by  and . Discharge plan reviewed with patient/ family. Patient/ family verbalize understanding of discharge plan and are in agreement with plan. Understanding was demonstrated using the teach back method.
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
## 2018-02-24 VITALS
DIASTOLIC BLOOD PRESSURE: 78 MMHG | WEIGHT: 142.5 LBS | BODY MASS INDEX: 23.74 KG/M2 | RESPIRATION RATE: 17 BRPM | HEART RATE: 98 BPM | HEIGHT: 65 IN | SYSTOLIC BLOOD PRESSURE: 132 MMHG | TEMPERATURE: 98.2 F | OXYGEN SATURATION: 97 %

## 2018-02-24 LAB
AMMONIA: 37 UMOL/L (ref 11–60)
ANAEROBIC CULTURE: NORMAL
ANION GAP SERPL CALCULATED.3IONS-SCNC: 10 MEQ/L (ref 8–16)
ANISOCYTOSIS: ABNORMAL
BASOPHILS # BLD: 0.6 %
BASOPHILS ABSOLUTE: 0.1 THOU/MM3 (ref 0–0.1)
BODY FLUID CULTURE, STERILE: NORMAL
BUN BLDV-MCNC: < 2 MG/DL (ref 7–22)
CALCIUM SERPL-MCNC: 8 MG/DL (ref 8.5–10.5)
CHLORIDE BLD-SCNC: 100 MEQ/L (ref 98–111)
CO2: 30 MEQ/L (ref 23–33)
CREAT SERPL-MCNC: 0.3 MG/DL (ref 0.4–1.2)
EOSINOPHIL # BLD: 0.9 %
EOSINOPHILS ABSOLUTE: 0.1 THOU/MM3 (ref 0–0.4)
GFR SERPL CREATININE-BSD FRML MDRD: > 90 ML/MIN/1.73M2
GLUCOSE BLD-MCNC: 104 MG/DL (ref 70–108)
GRAM STAIN RESULT: NORMAL
HCT VFR BLD CALC: 37.5 % (ref 37–47)
HEMOGLOBIN: 12.7 GM/DL (ref 12–16)
LYMPHOCYTES # BLD: 25.8 %
LYMPHOCYTES ABSOLUTE: 2.6 THOU/MM3 (ref 1–4.8)
MACROCYTES: ABNORMAL
MCH RBC QN AUTO: 33.9 PG (ref 27–31)
MCHC RBC AUTO-ENTMCNC: 34 GM/DL (ref 33–37)
MCV RBC AUTO: 99.8 FL (ref 81–99)
MONOCYTES # BLD: 9.3 %
MONOCYTES ABSOLUTE: 0.9 THOU/MM3 (ref 0.4–1.3)
NUCLEATED RED BLOOD CELLS: 0 /100 WBC
PDW BLD-RTO: 16.2 % (ref 11.5–14.5)
PLATELET # BLD: 160 THOU/MM3 (ref 130–400)
PMV BLD AUTO: 9.4 FL (ref 7.4–10.4)
POTASSIUM SERPL-SCNC: 3.8 MEQ/L (ref 3.5–5.2)
RBC # BLD: 3.76 MILL/MM3 (ref 4.2–5.4)
SEG NEUTROPHILS: 63.4 %
SEGMENTED NEUTROPHILS ABSOLUTE COUNT: 6.3 THOU/MM3 (ref 1.8–7.7)
SODIUM BLD-SCNC: 140 MEQ/L (ref 135–145)
WBC # BLD: 10 THOU/MM3 (ref 4.8–10.8)

## 2018-02-24 PROCEDURE — 6370000000 HC RX 637 (ALT 250 FOR IP): Performed by: INTERNAL MEDICINE

## 2018-02-24 PROCEDURE — 82140 ASSAY OF AMMONIA: CPT

## 2018-02-24 PROCEDURE — 2580000003 HC RX 258: Performed by: INTERNAL MEDICINE

## 2018-02-24 PROCEDURE — 6370000000 HC RX 637 (ALT 250 FOR IP): Performed by: NURSE PRACTITIONER

## 2018-02-24 PROCEDURE — 6360000002 HC RX W HCPCS: Performed by: FAMILY MEDICINE

## 2018-02-24 PROCEDURE — 85025 COMPLETE CBC W/AUTO DIFF WBC: CPT

## 2018-02-24 PROCEDURE — 36415 COLL VENOUS BLD VENIPUNCTURE: CPT

## 2018-02-24 PROCEDURE — 80048 BASIC METABOLIC PNL TOTAL CA: CPT

## 2018-02-24 PROCEDURE — 99239 HOSP IP/OBS DSCHRG MGMT >30: CPT | Performed by: INTERNAL MEDICINE

## 2018-02-24 RX ORDER — TRAMADOL HYDROCHLORIDE 50 MG/1
50 TABLET ORAL EVERY 6 HOURS PRN
Qty: 30 TABLET | Refills: 0 | Status: SHIPPED | OUTPATIENT
Start: 2018-02-24 | End: 2018-03-01

## 2018-02-24 RX ORDER — FUROSEMIDE 40 MG/1
40 TABLET ORAL DAILY
Qty: 30 TABLET | Refills: 0 | Status: ON HOLD | OUTPATIENT
Start: 2018-02-24 | End: 2019-01-22 | Stop reason: HOSPADM

## 2018-02-24 RX ORDER — ONDANSETRON 4 MG/1
4 TABLET, FILM COATED ORAL DAILY PRN
Qty: 30 TABLET | Refills: 0 | Status: SHIPPED | OUTPATIENT
Start: 2018-02-24 | End: 2018-03-03

## 2018-02-24 RX ORDER — PANTOPRAZOLE SODIUM 40 MG/1
40 TABLET, DELAYED RELEASE ORAL
Qty: 60 TABLET | Refills: 0 | Status: ON HOLD | OUTPATIENT
Start: 2018-02-24 | End: 2019-01-29 | Stop reason: HOSPADM

## 2018-02-24 RX ORDER — FOLIC ACID 1 MG/1
1 TABLET ORAL DAILY
Qty: 30 TABLET | Refills: 3 | Status: SHIPPED | OUTPATIENT
Start: 2018-02-25

## 2018-02-24 RX ADMIN — MULTIPLE VITAMINS W/ MINERALS TAB 1 TABLET: TAB at 09:19

## 2018-02-24 RX ADMIN — RIFAXIMIN 550 MG: 550 TABLET ORAL at 09:20

## 2018-02-24 RX ADMIN — ESCITALOPRAM 10 MG: 10 TABLET, FILM COATED ORAL at 09:20

## 2018-02-24 RX ADMIN — ONDANSETRON 4 MG: 2 INJECTION INTRAMUSCULAR; INTRAVENOUS at 16:14

## 2018-02-24 RX ADMIN — FOLIC ACID 1 MG: 1 TABLET ORAL at 09:20

## 2018-02-24 RX ADMIN — THIAMINE HCL (VITAMIN B1) 50 MG TABLET 100 MG: at 09:19

## 2018-02-24 RX ADMIN — LEVOTHYROXINE SODIUM 88 MCG: 88 TABLET ORAL at 06:18

## 2018-02-24 RX ADMIN — Medication 10 ML: at 09:22

## 2018-02-24 RX ADMIN — Medication 1000 MCG: at 09:20

## 2018-02-24 RX ADMIN — PANTOPRAZOLE SODIUM 40 MG: 40 TABLET, DELAYED RELEASE ORAL at 06:18

## 2018-02-24 RX ADMIN — SPIRONOLACTONE 200 MG: 25 TABLET, FILM COATED ORAL at 09:19

## 2018-02-24 RX ADMIN — ONDANSETRON 4 MG: 2 INJECTION INTRAMUSCULAR; INTRAVENOUS at 09:32

## 2018-02-24 RX ADMIN — LACTULOSE 20 G: 20 SOLUTION ORAL at 09:19

## 2018-02-24 NOTE — DISCHARGE SUMMARY
Discharge Summary    Patient:  Kandace Bermudez  YOB: 1980    MRN: [id number]   Acct: [de-identified]    Primary Care Physician: Mercedes Jackson NP    Admit date:  2/18/2018    Discharge date:   2/24/2018        Discharge Diagnoses:   Hematemesis  Principal Problem:    Hematemesis  Active Problems:    Alcoholic cirrhosis ([address]    Essential hypertension    Bipolar disorder ([facility] [address]    Hypothyroidism    Moderate malnutrition ([facility] [address]    Hematemesis with nausea  Resolved Problems:    * No resolved hospital problems. *        Admitted for: (HPI)Abdominal pain with hematemasis  HISTORY OF PRESENT ILLNESS:  The patient is a 15-year-old female, who is  also a known chronic alcoholic that presented to [facility]  emergency department with abdominal pain and hematemesis. Patient has long history of endstage alcoholic liver disease who is noncompliant with her medications and still drinks. Tomas Murray was admitted to ICU and GI was consulted. A EGD was done and showed multiple varices that were treated with clips. Her hemoglobin remained stable  Patient also had alcohol liver cirrhosis and had some acute encephalopathy secondary to liver disease and placed back on lactulose, nadolol, lasix and aldactone. Patient encephalopathy improved back to baseline. She also had some ascites and a paracentesis was done. 4 liters of fluid was removed. Psychiatry was also consulted for patients history of bipolar and alcohol abuse. Patient was counseled on alcohol cessation. She did not want inpatient rehab. Patient is currently hemodynamically stable and will be discharged home. Patient to followup with Dr. Hector Whelan for repeat EGD next week.     Consultants:  GI, ICU, psychiatry    Discharge Medications:     Medication List      START taking these medications    folic acid 1 MG tablet  Commonly known as:  FOLVITE  Take 1 tablet by mouth daily  Start taking on:  2/25/2018     furosemide 40 MG LINES/TUBES/MECHANICAL DEVICES: None. TRACHEA/HEART/MEDIASTINUM/HILUM: Unremarkable. LUNG HARTLEY: 1. There are diminished lung volumes. There is no consolidation or infiltrates. There is no pleural effusion or pulmonary vascular congestion. 2. There is a stable calcified granuloma within the left mid chest. OTHER: None. PNEUMOTHORAX: None. OSSEOUS STRUCTURES: 1. No acute osseous abnormality. 1. There is no acute cardiopulmonary process. **This report has been created using voice recognition software. It may contain minor errors which are inherent in voice recognition technology. ** Final report electronically signed by Dr. Rusty Almazan on 2/18/2018 9:38 AM       Results for orders placed or performed during the hospital encounter of 02/18/18   MRSA Screening Culture Only   Result Value Ref Range    MRSA SCREEN No MRSA isolated    VRE culture   Result Value Ref Range    Organism Culture screen is positive for VRE colonization. (A)     VRE Culture       Final organism identification: Enterococcus casseliflavus  (vancomycin resistant strain). Isolates of (VREF) vancomycin resistant Enterococcus FAECIUM  and FAECALIS ONLY require patient be placed in CONTACT  isolation. Isolates of other vancomycin resistant species of  Enterococcus do NOT require patient isolation. Body fluid culture   Result Value Ref Range    Body Fluid Culture, Sterile No growth-preliminary  No growth       Anaerobic Culture No growth-preliminary  No growth       Gram Stain Result       No segmented neutrophils observed. No organisms observed.   performed on cytospun specimen     Urine Culture   Result Value Ref Range    Urine Culture, Routine No growth-preliminary  No growth      CBC auto differential   Result Value Ref Range    WBC 8.5 4.8 - 10.8 thou/mm3    RBC 3.05 (L) 4.20 - 5.40 mill/mm3    Hemoglobin 10.1 (L) 12.0 - 16.0 gm/dl    Hematocrit 29.3 (L) 37.0 - 47.0 %    MCV 96.2 81.0 - 99.0 fL    MCH 33.2 (H) 27.0 - 31.0 pg    MCHC 34.5 0 (L) 25 - 100 %    Monocyte Count, Fluid 0 (L) 25 - 100 %   Glucose, Body Fluid   Result Value Ref Range    Glucose, Fluid 115 mg/dl   Amylase, Body Fluid   Result Value Ref Range    Amylase, Fluid < 3 U/L   CBC auto differential   Result Value Ref Range    WBC 3.8 (L) 4.8 - 10.8 thou/mm3    RBC 3.13 (L) 4.20 - 5.40 mill/mm3    Hemoglobin 10.5 (L) 12.0 - 16.0 gm/dl    Hematocrit 31.0 (L) 37.0 - 47.0 %    MCV 99.0 81.0 - 99.0 fL    MCH 33.5 (H) 27.0 - 31.0 pg    MCHC 33.8 33.0 - 37.0 gm/dl    RDW 16.1 (H) 11.5 - 14.5 %    Platelets 97 (L) 302 - 400 thou/mm3    MPV 9.5 7.4 - 10.4 fL    Seg Neutrophils 67.7 %    Lymphocytes 17.6 %    Monocytes 9.9 %    Eosinophils 1.1 %    Basophils 3.7 %    nRBC 0 /100 wbc    Anisocytosis 1+ Absent    Segs Absolute 2.6 1.8 - 7.7 thou/mm3    Lymphocytes # 0.7 (L) 1.0 - 4.8 thou/mm3    Monocytes # 0.4 0.4 - 1.3 thou/mm3    Eosinophils # 0.0 0.0 - 0.4 thou/mm3    Basophils # 0.1 0.0 - 0.1 thou/mm3   Basic Metabolic Panel   Result Value Ref Range    Sodium 136 135 - 145 meq/L    Potassium 4.0 3.5 - 5.2 meq/L    Chloride 102 98 - 111 meq/L    CO2 22 (L) 23 - 33 meq/L    Glucose 114 (H) 70 - 108 mg/dL    BUN 3 (L) 7 - 22 mg/dL    CREATININE 0.3 (L) 0.4 - 1.2 mg/dL    Calcium 7.7 (L) 8.5 - 10.5 mg/dL   Magnesium   Result Value Ref Range    Magnesium 1.6 1.6 - 2.4 mg/dL   Phosphorus   Result Value Ref Range    Phosphorus 2.3 (L) 2.4 - 4.7 mg/dL   Anion Gap   Result Value Ref Range    Anion Gap 12.0 8.0 - 16.0 meq/L   Glomerular Filtration Rate, Estimated   Result Value Ref Range    Est, Glom Filt Rate >90 ml/min/1.73m2   Albumin, fluid   Result Value Ref Range    Albumin, Fluid < 0.2 gm/dl   Ammonia   Result Value Ref Range    Ammonia 161 (H) 11 - 60 umol/L   CBC   Result Value Ref Range    WBC 4.6 (L) 4.8 - 10.8 thou/mm3    RBC 3.34 (L) 4.20 - 5.40 mill/mm3    Hemoglobin 11.2 (L) 12.0 - 16.0 gm/dl    Hematocrit 32.7 (L) 37.0 - 47.0 %    MCV 98.1 81.0 - 99.0 fL    MCH 33.7 (H) 27.0 - 31.0 pg - 4.8 thou/mm3    Monocytes # 0.8 0.4 - 1.3 thou/mm3    Eosinophils # 0.1 0.0 - 0.4 thou/mm3    Basophils # 0.1 0.0 - 0.1 thou/mm3   Basic Metabolic Panel   Result Value Ref Range    Sodium 141 135 - 145 meq/L    Potassium 3.4 (L) 3.5 - 5.2 meq/L    Chloride 103 98 - 111 meq/L    CO2 27 23 - 33 meq/L    Glucose 127 (H) 70 - 108 mg/dL    BUN <2 (L) 7 - 22 mg/dL    CREATININE < 0.2 (L) 0.4 - 1.2 mg/dL    Calcium 7.3 (L) 8.5 - 10.5 mg/dL   Ammonia   Result Value Ref Range    Ammonia 76 (H) 11 - 60 umol/L   Anion Gap   Result Value Ref Range    Anion Gap 11.0 8.0 - 16.0 meq/L   Glomerular Filtration Rate, Estimated   Result Value Ref Range    Est, Glom Filt Rate >90 ml/min/1.73m2   CBC auto differential   Result Value Ref Range    WBC 10.0 4.8 - 10.8 thou/mm3    RBC 3.76 (L) 4.20 - 5.40 mill/mm3    Hemoglobin 12.7 12.0 - 16.0 gm/dl    Hematocrit 37.5 37.0 - 47.0 %    MCV 99.8 (H) 81.0 - 99.0 fL    MCH 33.9 (H) 27.0 - 31.0 pg    MCHC 34.0 33.0 - 37.0 gm/dl    RDW 16.2 (H) 11.5 - 14.5 %    Platelets 298 276 - 826 thou/mm3    MPV 9.4 7.4 - 10.4 fL    Seg Neutrophils 63.4 %    Lymphocytes 25.8 %    Monocytes 9.3 %    Eosinophils 0.9 %    Basophils 0.6 %    nRBC 0 /100 wbc    Macrocytes 1+ Absent    Anisocytosis 1+ Absent    Segs Absolute 6.3 1.8 - 7.7 thou/mm3    Lymphocytes # 2.6 1.0 - 4.8 thou/mm3    Monocytes # 0.9 0.4 - 1.3 thou/mm3    Eosinophils # 0.1 0.0 - 0.4 thou/mm3    Basophils # 0.1 0.0 - 0.1 thou/mm3   Basic Metabolic Panel   Result Value Ref Range    Sodium 140 135 - 145 meq/L    Potassium 3.8 3.5 - 5.2 meq/L    Chloride 100 98 - 111 meq/L    CO2 30 23 - 33 meq/L    Glucose 104 70 - 108 mg/dL    BUN <2 (L) 7 - 22 mg/dL    CREATININE 0.3 (L) 0.4 - 1.2 mg/dL    Calcium 8.0 (L) 8.5 - 10.5 mg/dL   Ammonia   Result Value Ref Range    Ammonia 37 11 - 60 umol/L   Potassium   Result Value Ref Range    Potassium 3.3 (L) 3.5 - 5.2 meq/L   Anion Gap   Result Value Ref Range    Anion Gap 10.0 8.0 - 16.0 meq/L   Glomerular

## 2018-02-24 NOTE — PLAN OF CARE
Problem: Pain:  Goal: Pain level will decrease  Pain level will decrease   Outcome: Ongoing  Pain is improving. Patient was given IV morphine for a pain of 6/10. Patient stated that pain is decreasing. Patient reported pain is now 5/10 and continuing to improve. Patient pain goal is 3/10. Goal: Control of acute pain  Control of acute pain   Outcome: Ongoing  Acute abdominal pain from paracentesis 6/10, improved with pain medication. Problem: Falls - Risk of  Goal: Absence of falls  Outcome: Ongoing  Patient has not had a fall thus far this shift. Patient gets up with 2 assist and a walker. Patient is very unsteady on her feet and weak. Patient has been educated on fall precautions and to not get up without assistance. Patient verbalized understanding. Problem: Risk for Impaired Skin Integrity  Goal: Tissue integrity - skin and mucous membranes  Structural intactness and normal physiological function of skin and  mucous membranes. Outcome: Ongoing  Patient's skin is intact. Patient has blanching redness on buttocks and coccyx. Patient has been educated to turn and reposition every 2 hours. Patient refuses to turn as recommended. Patient refused to turn for day shift. Patient will be turned using a pillow support during night shift as patient tolerates. Education provided regarding the benefits of repositioning and the risk to skin integrity associated with not repositioning as recommended. Problem: Discharge Planning:  Goal: Participates in care planning  Participates in care planning    Outcome: Ongoing  Patient is participating in care planning. Patient has been educated on plan of care but needs reinforcement. Problem: Airway Clearance - Ineffective:  Goal: Ability to maintain a clear airway will improve  Ability to maintain a clear airway will improve   Outcome: Ongoing  Patient has clear airway at this time. Will continue to monitor. Lung sounds are clear. Patient has dyspnea with exertion. Problem: Fluid Volume - Imbalance:  Goal: Absence of imbalanced fluid volume signs and symptoms  Absence of imbalanced fluid volume signs and symptoms   Outcome: Ongoing  Patient output is improving. Will continue to monitor. Problem: Nutrition  Goal: Optimal nutrition therapy  Outcome: Ongoing  Patient has poor appetite. Patient is being encouraged to eat small frequent meals as tolerated. Problem: Urinary Elimination:  Goal: Signs and symptoms of infection will decrease  Signs and symptoms of infection will decrease   Outcome: Ongoing  Patient S/S of infection decreasing. No mccall complications. Patient mccall is draining appropriately. Problem: Physical Regulation:  Goal: Complications related to the disease process, condition or treatment will be avoided or minimized  Complications related to the disease process, condition or treatment will be avoided or minimized   Outcome: Ongoing  Patient continues to have abdominal pain but pain is improving. Will continue to monitor abdominal pain. Comments: Care plan reviewed with patient. Patient verbalizes understanding of the plan of care and contribute to goal setting.

## 2018-02-27 ENCOUNTER — TELEPHONE (OUTPATIENT)
Dept: ADMINISTRATIVE | Age: 38
End: 2018-02-27

## 2018-03-20 ENCOUNTER — HOSPITAL ENCOUNTER (OUTPATIENT)
Age: 38
Setting detail: OUTPATIENT SURGERY
Discharge: HOME HEALTH | End: 2018-03-20
Attending: INTERNAL MEDICINE | Admitting: INTERNAL MEDICINE
Payer: MEDICARE

## 2018-03-20 ENCOUNTER — ANESTHESIA (OUTPATIENT)
Dept: ENDOSCOPY | Age: 38
End: 2018-03-20
Payer: MEDICARE

## 2018-03-20 ENCOUNTER — ANESTHESIA EVENT (OUTPATIENT)
Dept: ENDOSCOPY | Age: 38
End: 2018-03-20
Payer: MEDICARE

## 2018-03-20 VITALS
DIASTOLIC BLOOD PRESSURE: 65 MMHG | RESPIRATION RATE: 19 BRPM | OXYGEN SATURATION: 100 % | SYSTOLIC BLOOD PRESSURE: 98 MMHG

## 2018-03-20 VITALS
HEART RATE: 87 BPM | HEIGHT: 65 IN | WEIGHT: 130 LBS | BODY MASS INDEX: 21.66 KG/M2 | OXYGEN SATURATION: 100 % | SYSTOLIC BLOOD PRESSURE: 112 MMHG | TEMPERATURE: 98.4 F | DIASTOLIC BLOOD PRESSURE: 72 MMHG | RESPIRATION RATE: 16 BRPM

## 2018-03-20 PROCEDURE — 2720000010 HC SURG SUPPLY STERILE: Performed by: INTERNAL MEDICINE

## 2018-03-20 PROCEDURE — 6360000002 HC RX W HCPCS: Performed by: NURSE ANESTHETIST, CERTIFIED REGISTERED

## 2018-03-20 PROCEDURE — 7100000000 HC PACU RECOVERY - FIRST 15 MIN: Performed by: INTERNAL MEDICINE

## 2018-03-20 PROCEDURE — 3700000001 HC ADD 15 MINUTES (ANESTHESIA): Performed by: INTERNAL MEDICINE

## 2018-03-20 PROCEDURE — 7100000001 HC PACU RECOVERY - ADDTL 15 MIN: Performed by: INTERNAL MEDICINE

## 2018-03-20 PROCEDURE — 6360000002 HC RX W HCPCS: Performed by: INTERNAL MEDICINE

## 2018-03-20 PROCEDURE — 3609017100 HC EGD: Performed by: INTERNAL MEDICINE

## 2018-03-20 PROCEDURE — 2580000003 HC RX 258: Performed by: INTERNAL MEDICINE

## 2018-03-20 PROCEDURE — 2500000003 HC RX 250 WO HCPCS: Performed by: NURSE ANESTHETIST, CERTIFIED REGISTERED

## 2018-03-20 PROCEDURE — 3700000000 HC ANESTHESIA ATTENDED CARE: Performed by: INTERNAL MEDICINE

## 2018-03-20 RX ORDER — LIDOCAINE HYDROCHLORIDE 20 MG/ML
INJECTION, SOLUTION INFILTRATION; PERINEURAL PRN
Status: DISCONTINUED | OUTPATIENT
Start: 2018-03-20 | End: 2018-03-20 | Stop reason: SDUPTHER

## 2018-03-20 RX ORDER — ONDANSETRON 2 MG/ML
4 INJECTION INTRAMUSCULAR; INTRAVENOUS ONCE
Status: COMPLETED | OUTPATIENT
Start: 2018-03-20 | End: 2018-03-20

## 2018-03-20 RX ORDER — GLYCOPYRROLATE 1 MG/5 ML
SYRINGE (ML) INTRAVENOUS PRN
Status: DISCONTINUED | OUTPATIENT
Start: 2018-03-20 | End: 2018-03-20 | Stop reason: SDUPTHER

## 2018-03-20 RX ORDER — ONDANSETRON 4 MG/1
4 TABLET, FILM COATED ORAL EVERY 8 HOURS PRN
Status: ON HOLD | COMMUNITY
End: 2019-01-22

## 2018-03-20 RX ORDER — SODIUM CHLORIDE 450 MG/100ML
INJECTION, SOLUTION INTRAVENOUS CONTINUOUS
Status: DISCONTINUED | OUTPATIENT
Start: 2018-03-20 | End: 2018-03-20 | Stop reason: HOSPADM

## 2018-03-20 RX ORDER — KETAMINE HYDROCHLORIDE 50 MG/ML
INJECTION, SOLUTION, CONCENTRATE INTRAMUSCULAR; INTRAVENOUS PRN
Status: DISCONTINUED | OUTPATIENT
Start: 2018-03-20 | End: 2018-03-20 | Stop reason: SDUPTHER

## 2018-03-20 RX ORDER — TRAMADOL HYDROCHLORIDE 50 MG/1
50 TABLET ORAL EVERY 6 HOURS PRN
Status: ON HOLD | COMMUNITY
End: 2019-01-22 | Stop reason: HOSPADM

## 2018-03-20 RX ADMIN — Medication 0.2 MG: at 12:15

## 2018-03-20 RX ADMIN — SODIUM CHLORIDE: 4.5 INJECTION, SOLUTION INTRAVENOUS at 11:42

## 2018-03-20 RX ADMIN — KETAMINE HYDROCHLORIDE 25 MG: 50 INJECTION, SOLUTION INTRAMUSCULAR; INTRAVENOUS at 12:15

## 2018-03-20 RX ADMIN — ONDANSETRON 4 MG: 2 INJECTION INTRAMUSCULAR; INTRAVENOUS at 12:45

## 2018-03-20 RX ADMIN — PROPOFOL 70 MG: 10 INJECTION, EMULSION INTRAVENOUS at 12:15

## 2018-03-20 RX ADMIN — LIDOCAINE HYDROCHLORIDE 100 MG: 20 INJECTION, SOLUTION INFILTRATION; PERINEURAL at 12:15

## 2018-03-20 ASSESSMENT — PAIN - FUNCTIONAL ASSESSMENT: PAIN_FUNCTIONAL_ASSESSMENT: 0-10

## 2018-03-20 ASSESSMENT — PAIN SCALES - GENERAL
PAINLEVEL_OUTOF10: 0
PAINLEVEL_OUTOF10: 0
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
## 2018-03-21 NOTE — OP NOTE
standard  fashion and then scope was reinserted to 41 cm, where the patient had that  varix and band was put in on both the columns and after two bands, the  proximal veins collapsed. Scope was withdrawn. The patient did have  changes of Ishmael-en-Y with very small gastric pouch and normal  gastrojejunostomy site. Tolerated the procedure well. IMPRESSION:  1. Esophageal varices post banding. 2.  Scarring from recent banding device. 3.  Changes of Ishmael-en-Y.    RECOMMENDATIONS:  In three months, we will have another recheck upon this. The patient and  were counseled about alcohol, and were notified  about having problems with dysphagia in the next few days.         Aziza Rand M.D.    D: 03/20/2018 12:37:03       T: 03/20/2018 12:55:21     [email or web address]  Job#: [id number]     Doc#: [id number]    CC:

## 2018-05-09 ENCOUNTER — HOSPITAL ENCOUNTER (OUTPATIENT)
Dept: ULTRASOUND IMAGING | Age: 38
Discharge: HOME | End: 2018-05-09
Payer: MEDICARE

## 2018-05-09 DIAGNOSIS — K74.60 CIRRHOSIS OF LIVER WITHOUT ASCITES, UNSPECIFIED HEPATIC CIRRHOSIS TYPE (HCC): ICD-10-CM

## 2018-05-09 LAB
AFP-TUMOR MARKER: 10.7 UG/L
ALBUMIN SERPL-MCNC: 3.3 G/DL (ref 3.5–5.1)
ALP BLD-CCNC: 162 U/L (ref 38–126)
ALT SERPL-CCNC: 38 U/L (ref 11–66)
AMMONIA: 25 UMOL/L (ref 11–60)
AMYLASE: 68 U/L (ref 20–104)
ANION GAP SERPL CALCULATED.3IONS-SCNC: 13 MEQ/L (ref 8–16)
AST SERPL-CCNC: 159 U/L (ref 5–40)
BILIRUB SERPL-MCNC: 1.3 MG/DL (ref 0.3–1.2)
BUN BLDV-MCNC: 4 MG/DL (ref 7–22)
CALCIUM SERPL-MCNC: 8.2 MG/DL (ref 8.5–10.5)
CHLORIDE BLD-SCNC: 102 MEQ/L (ref 98–111)
CO2: 28 MEQ/L (ref 23–33)
CREAT SERPL-MCNC: 0.4 MG/DL (ref 0.4–1.2)
GFR SERPL CREATININE-BSD FRML MDRD: > 90 ML/MIN/1.73M2
GLUCOSE BLD-MCNC: 92 MG/DL (ref 70–108)
HCT VFR BLD CALC: 32 % (ref 37–47)
HEMOGLOBIN: 10.7 GM/DL (ref 12–16)
INR BLD: 1.28 (ref 0.85–1.13)
LIPASE: 26.6 U/L (ref 5.6–51.3)
MCH RBC QN AUTO: 31.2 PG (ref 27–31)
MCHC RBC AUTO-ENTMCNC: 33.5 GM/DL (ref 33–37)
MCV RBC AUTO: 93.1 FL (ref 81–99)
PDW BLD-RTO: 15.1 % (ref 11.5–14.5)
PLATELET # BLD: 150 THOU/MM3 (ref 130–400)
PMV BLD AUTO: 8.4 FL (ref 7.4–10.4)
POTASSIUM SERPL-SCNC: 4.2 MEQ/L (ref 3.5–5.2)
RBC # BLD: 3.44 MILL/MM3 (ref 4.2–5.4)
SODIUM BLD-SCNC: 143 MEQ/L (ref 135–145)
TOTAL PROTEIN: 6.8 G/DL (ref 6.1–8)
WBC # BLD: 3.6 THOU/MM3 (ref 4.8–10.8)

## 2018-05-09 PROCEDURE — 85610 PROTHROMBIN TIME: CPT

## 2018-05-09 PROCEDURE — 82105 ALPHA-FETOPROTEIN SERUM: CPT

## 2018-05-09 PROCEDURE — 93975 VASCULAR STUDY: CPT

## 2018-05-09 PROCEDURE — 36415 COLL VENOUS BLD VENIPUNCTURE: CPT

## 2018-05-09 PROCEDURE — 83690 ASSAY OF LIPASE: CPT

## 2018-05-09 PROCEDURE — 82140 ASSAY OF AMMONIA: CPT

## 2018-05-09 PROCEDURE — 82150 ASSAY OF AMYLASE: CPT

## 2018-05-09 PROCEDURE — 85027 COMPLETE CBC AUTOMATED: CPT

## 2018-05-09 PROCEDURE — 80053 COMPREHEN METABOLIC PANEL: CPT

## 2018-05-09 PROCEDURE — 76705 ECHO EXAM OF ABDOMEN: CPT

## 2018-05-23 ENCOUNTER — HOSPITAL ENCOUNTER (INPATIENT)
Age: 38
LOS: 4 days | Discharge: HOME | DRG: 369 | End: 2018-05-27
Attending: FAMILY MEDICINE | Admitting: HOSPITALIST
Payer: MEDICARE

## 2018-05-23 DIAGNOSIS — K29.01 GASTROINTESTINAL HEMORRHAGE ASSOCIATED WITH ACUTE GASTRITIS: Primary | ICD-10-CM

## 2018-05-23 PROBLEM — K92.2 GI BLEED: Status: ACTIVE | Noted: 2018-05-23

## 2018-05-23 LAB
AMYLASE: 46 U/L (ref 25–115)
ANION GAP: 8 MEQ/L (ref 8–16)
APTT: 32.7 SECONDS (ref 22–38)
BUN BLDV-MCNC: 4 MG/DL (ref 7–18)
CHLORIDE BLD-SCNC: 107 MEQ/L (ref 98–107)
CO2: 28 MEQ/L (ref 21–32)
CREAT SERPL-MCNC: 0.9 MG/DL (ref 0.6–1.3)
DIFFERENTIAL, MANUAL: NORMAL
EKG ATRIAL RATE: 103 BPM
EKG P AXIS: 49 DEGREES
EKG P-R INTERVAL: 134 MS
EKG Q-T INTERVAL: 346 MS
EKG QRS DURATION: 70 MS
EKG QTC CALCULATION (BAZETT): 453 MS
EKG R AXIS: 41 DEGREES
EKG T AXIS: 44 DEGREES
EKG VENTRICULAR RATE: 103 BPM
GFR, ESTIMATED: 75 ML/MIN/1.73M2
GLUCOSE BLD-MCNC: 210 MG/DL (ref 74–106)
HEMOCCULT STL QL: POSITIVE
INR BLD: 1.44 (ref 0.85–1.13)
LIPASE: 64 U/L (ref 73–393)
POC CALCIUM: 7.6 MG/DL (ref 8.5–10.1)
POTASSIUM SERPL-SCNC: 4.2 MEQ/L (ref 3.5–5.1)
SODIUM BLD-SCNC: 143 MEQ/L (ref 136–145)

## 2018-05-23 PROCEDURE — 86923 COMPATIBILITY TEST ELECTRIC: CPT

## 2018-05-23 PROCEDURE — 85730 THROMBOPLASTIN TIME PARTIAL: CPT

## 2018-05-23 PROCEDURE — 85025 COMPLETE CBC W/AUTO DIFF WBC: CPT

## 2018-05-23 PROCEDURE — 06L38CZ OCCLUSION OF ESOPHAGEAL VEIN WITH EXTRALUMINAL DEVICE, VIA NATURAL OR ARTIFICIAL OPENING ENDOSCOPIC: ICD-10-PCS | Performed by: INTERNAL MEDICINE

## 2018-05-23 PROCEDURE — 86901 BLOOD TYPING SEROLOGIC RH(D): CPT

## 2018-05-23 PROCEDURE — 86850 RBC ANTIBODY SCREEN: CPT

## 2018-05-23 PROCEDURE — 86900 BLOOD TYPING SEROLOGIC ABO: CPT

## 2018-05-23 PROCEDURE — 99222 1ST HOSP IP/OBS MODERATE 55: CPT | Performed by: HOSPITALIST

## 2018-05-23 PROCEDURE — 93005 ELECTROCARDIOGRAM TRACING: CPT | Performed by: FAMILY MEDICINE

## 2018-05-23 PROCEDURE — 99285 EMERGENCY DEPT VISIT HI MDM: CPT

## 2018-05-23 PROCEDURE — 1200000003 HC TELEMETRY R&B

## 2018-05-23 PROCEDURE — 82150 ASSAY OF AMYLASE: CPT

## 2018-05-23 PROCEDURE — 83690 ASSAY OF LIPASE: CPT

## 2018-05-23 PROCEDURE — 2580000003 HC RX 258: Performed by: FAMILY MEDICINE

## 2018-05-23 PROCEDURE — 80048 BASIC METABOLIC PNL TOTAL CA: CPT

## 2018-05-23 PROCEDURE — 82272 OCCULT BLD FECES 1-3 TESTS: CPT

## 2018-05-23 PROCEDURE — 36415 COLL VENOUS BLD VENIPUNCTURE: CPT

## 2018-05-23 PROCEDURE — 85610 PROTHROMBIN TIME: CPT

## 2018-05-23 RX ORDER — LEVOTHYROXINE SODIUM 88 UG/1
88 TABLET ORAL DAILY
Status: DISCONTINUED | OUTPATIENT
Start: 2018-05-24 | End: 2018-05-27 | Stop reason: HOSPADM

## 2018-05-23 RX ORDER — ONDANSETRON 4 MG/1
4 TABLET, FILM COATED ORAL EVERY 8 HOURS PRN
Status: DISCONTINUED | OUTPATIENT
Start: 2018-05-23 | End: 2018-05-27 | Stop reason: HOSPADM

## 2018-05-23 RX ORDER — ONDANSETRON 2 MG/ML
4 INJECTION INTRAMUSCULAR; INTRAVENOUS EVERY 6 HOURS PRN
Status: DISCONTINUED | OUTPATIENT
Start: 2018-05-23 | End: 2018-05-27 | Stop reason: HOSPADM

## 2018-05-23 RX ORDER — SODIUM CHLORIDE 9 MG/ML
INJECTION, SOLUTION INTRAVENOUS CONTINUOUS
Status: DISCONTINUED | OUTPATIENT
Start: 2018-05-24 | End: 2018-05-24

## 2018-05-23 RX ORDER — ESCITALOPRAM OXALATE 10 MG/1
10 TABLET ORAL DAILY
Status: DISCONTINUED | OUTPATIENT
Start: 2018-05-24 | End: 2018-05-24

## 2018-05-23 RX ORDER — OCTREOTIDE ACETATE 50 UG/ML
50 INJECTION, SOLUTION INTRAVENOUS; SUBCUTANEOUS ONCE
Status: COMPLETED | OUTPATIENT
Start: 2018-05-24 | End: 2018-05-24

## 2018-05-23 RX ORDER — SODIUM CHLORIDE 0.9 % (FLUSH) 0.9 %
10 SYRINGE (ML) INJECTION EVERY 12 HOURS SCHEDULED
Status: DISCONTINUED | OUTPATIENT
Start: 2018-05-23 | End: 2018-05-27 | Stop reason: HOSPADM

## 2018-05-23 RX ORDER — SPIRONOLACTONE 25 MG/1
200 TABLET ORAL DAILY
Status: DISCONTINUED | OUTPATIENT
Start: 2018-05-24 | End: 2018-05-27 | Stop reason: HOSPADM

## 2018-05-23 RX ORDER — 0.9 % SODIUM CHLORIDE 0.9 %
1000 INTRAVENOUS SOLUTION INTRAVENOUS ONCE
Status: COMPLETED | OUTPATIENT
Start: 2018-05-23 | End: 2018-05-23

## 2018-05-23 RX ORDER — ACETAMINOPHEN 325 MG/1
650 TABLET ORAL EVERY 4 HOURS PRN
Status: DISCONTINUED | OUTPATIENT
Start: 2018-05-23 | End: 2018-05-27 | Stop reason: HOSPADM

## 2018-05-23 RX ORDER — NADOLOL 40 MG/1
20 TABLET ORAL DAILY
Status: DISCONTINUED | OUTPATIENT
Start: 2018-05-24 | End: 2018-05-27 | Stop reason: HOSPADM

## 2018-05-23 RX ORDER — SODIUM CHLORIDE 0.9 % (FLUSH) 0.9 %
10 SYRINGE (ML) INJECTION PRN
Status: DISCONTINUED | OUTPATIENT
Start: 2018-05-23 | End: 2018-05-24 | Stop reason: SDUPTHER

## 2018-05-23 RX ORDER — SODIUM CHLORIDE 0.9 % (FLUSH) 0.9 %
10 SYRINGE (ML) INJECTION EVERY 12 HOURS SCHEDULED
Status: DISCONTINUED | OUTPATIENT
Start: 2018-05-23 | End: 2018-05-24 | Stop reason: SDUPTHER

## 2018-05-23 RX ORDER — LACTULOSE 10 G/15ML
10 SOLUTION ORAL DAILY
Status: DISCONTINUED | OUTPATIENT
Start: 2018-05-24 | End: 2018-05-24

## 2018-05-23 RX ORDER — CYANOCOBALAMIN (VITAMIN B-12) 1000 MCG
1000 TABLET, EXTENDED RELEASE ORAL
Status: DISCONTINUED | OUTPATIENT
Start: 2018-05-23 | End: 2018-05-24 | Stop reason: SDUPTHER

## 2018-05-23 RX ORDER — LORAZEPAM 0.5 MG/1
0.5 TABLET ORAL DAILY PRN
Status: DISCONTINUED | OUTPATIENT
Start: 2018-05-23 | End: 2018-05-27 | Stop reason: HOSPADM

## 2018-05-23 RX ORDER — UREA 10 %
10 LOTION (ML) TOPICAL NIGHTLY PRN
Status: DISCONTINUED | OUTPATIENT
Start: 2018-05-24 | End: 2018-05-27 | Stop reason: HOSPADM

## 2018-05-23 RX ORDER — FOLIC ACID 1 MG/1
1 TABLET ORAL DAILY
Status: DISCONTINUED | OUTPATIENT
Start: 2018-05-24 | End: 2018-05-27 | Stop reason: HOSPADM

## 2018-05-23 RX ORDER — ACETAMINOPHEN 325 MG/1
650 TABLET ORAL EVERY 4 HOURS PRN
Status: DISCONTINUED | OUTPATIENT
Start: 2018-05-23 | End: 2018-05-24 | Stop reason: SDUPTHER

## 2018-05-23 RX ORDER — SUCRALFATE 1 G/1
1 TABLET ORAL EVERY 8 HOURS SCHEDULED
Status: DISCONTINUED | OUTPATIENT
Start: 2018-05-24 | End: 2018-05-24

## 2018-05-23 RX ORDER — QUETIAPINE FUMARATE 100 MG/1
100 TABLET, FILM COATED ORAL NIGHTLY
Status: DISCONTINUED | OUTPATIENT
Start: 2018-05-24 | End: 2018-05-24

## 2018-05-23 RX ORDER — SODIUM CHLORIDE 0.9 % (FLUSH) 0.9 %
10 SYRINGE (ML) INJECTION PRN
Status: DISCONTINUED | OUTPATIENT
Start: 2018-05-23 | End: 2018-05-27 | Stop reason: HOSPADM

## 2018-05-23 RX ADMIN — SODIUM CHLORIDE 1000 ML: 9 INJECTION, SOLUTION INTRAVENOUS at 19:56

## 2018-05-23 ASSESSMENT — PAIN DESCRIPTION - DESCRIPTORS
DESCRIPTORS: DISCOMFORT;HEADACHE
DESCRIPTORS: DISCOMFORT;HEADACHE
DESCRIPTORS: CRAMPING;DISCOMFORT
DESCRIPTORS: ACHING

## 2018-05-23 ASSESSMENT — PAIN DESCRIPTION - LOCATION
LOCATION: HEAD;ABDOMEN
LOCATION: ABDOMEN
LOCATION: HEAD;ABDOMEN
LOCATION: ABDOMEN

## 2018-05-23 ASSESSMENT — PAIN SCALES - GENERAL
PAINLEVEL_OUTOF10: 9
PAINLEVEL_OUTOF10: 6
PAINLEVEL_OUTOF10: 9

## 2018-05-23 ASSESSMENT — PATIENT HEALTH QUESTIONNAIRE - PHQ9: SUM OF ALL RESPONSES TO PHQ QUESTIONS 1-9: 2

## 2018-05-24 ENCOUNTER — ANESTHESIA (OUTPATIENT)
Dept: ENDOSCOPY | Age: 38
DRG: 369 | End: 2018-05-24
Payer: MEDICARE

## 2018-05-24 ENCOUNTER — ANESTHESIA EVENT (OUTPATIENT)
Dept: ENDOSCOPY | Age: 38
DRG: 369 | End: 2018-05-24
Payer: MEDICARE

## 2018-05-24 VITALS
OXYGEN SATURATION: 96 % | DIASTOLIC BLOOD PRESSURE: 66 MMHG | RESPIRATION RATE: 20 BRPM | SYSTOLIC BLOOD PRESSURE: 119 MMHG

## 2018-05-24 LAB
ABO: NORMAL
AMMONIA: 39 UMOL/L (ref 11–60)
ANION GAP SERPL CALCULATED.3IONS-SCNC: 12 MEQ/L (ref 8–16)
ANISOCYTOSIS: SLIGHT
ANTIBODY SCREEN: NORMAL
ATYPICAL LYMPHOCYTES: ABNORMAL %
BASOPHILS # BLD: 0 % (ref 0–3)
BUN BLDV-MCNC: 5 MG/DL (ref 7–22)
CALCIUM SERPL-MCNC: 7.3 MG/DL (ref 8.5–10.5)
CHLORIDE BLD-SCNC: 112 MEQ/L (ref 98–111)
CO2: 22 MEQ/L (ref 23–33)
CREAT SERPL-MCNC: 0.4 MG/DL (ref 0.4–1.2)
DIFFERENTIAL, AUTO: ABNORMAL
EOSINOPHILS RELATIVE PERCENT: 1 % (ref 0–4)
GFR SERPL CREATININE-BSD FRML MDRD: > 90 ML/MIN/1.73M2
GLUCOSE BLD-MCNC: 72 MG/DL (ref 70–108)
HCT VFR BLD CALC: 21 % (ref 37–47)
HCT VFR BLD CALC: 25.7 % (ref 37–47)
HEMOGLOBIN: 7 GM/DL (ref 12–16)
HEMOGLOBIN: 8.6 GM/DL (ref 12–16)
LYMPHOCYTES # BLD: 68 % (ref 15–47)
MAGNESIUM: 2 MG/DL (ref 1.6–2.4)
MCH RBC QN AUTO: 31.3 PG (ref 27–31)
MCH RBC QN AUTO: 31.3 PG (ref 27–31)
MCHC RBC AUTO-ENTMCNC: 33.5 GM/DL (ref 33–37)
MCHC RBC AUTO-ENTMCNC: 33.5 GM/DL (ref 33–37)
MCV RBC AUTO: 93.4 FL (ref 81–99)
MCV RBC AUTO: 93.5 FL (ref 81–99)
MONOCYTES: 2 % (ref 0–12)
PATHOLOGIST REVIEW: ABNORMAL
PDW BLD-RTO: 14.6 % (ref 11.5–14.5)
PDW BLD-RTO: 16.3 % (ref 11.5–14.5)
PLATELET # BLD: 66 THOU/MM3 (ref 130–400)
PLATELET # BLD: 99 THOU/MM3 (ref 130–400)
PLATELET ESTIMATE: ABNORMAL
PMV BLD AUTO: 8.2 FL (ref 7.4–10.4)
PMV BLD AUTO: 8.5 FL (ref 7.4–10.4)
POTASSIUM REFLEX MAGNESIUM: 4.6 MEQ/L (ref 3.5–5.2)
RBC # BLD: 2.25 MILL/MM3 (ref 4.2–5.4)
RBC # BLD: 2.75 MILL/MM3 (ref 4.2–5.4)
RBC # BLD: ABNORMAL 10*6/UL
RH FACTOR: NORMAL
SEGS: 29 % (ref 43–75)
SODIUM BLD-SCNC: 146 MEQ/L (ref 135–145)
TARGET CELLS: ABNORMAL
WBC # BLD: 2.6 THOU/MM3 (ref 4.8–10.8)
WBC # BLD: 4.6 THOU/MM3 (ref 4.8–10.8)

## 2018-05-24 PROCEDURE — 6370000000 HC RX 637 (ALT 250 FOR IP): Performed by: HOSPITALIST

## 2018-05-24 PROCEDURE — 6360000002 HC RX W HCPCS: Performed by: HOSPITALIST

## 2018-05-24 PROCEDURE — 82140 ASSAY OF AMMONIA: CPT

## 2018-05-24 PROCEDURE — 83735 ASSAY OF MAGNESIUM: CPT

## 2018-05-24 PROCEDURE — P9035 PLATELET PHERES LEUKOREDUCED: HCPCS

## 2018-05-24 PROCEDURE — 2500000003 HC RX 250 WO HCPCS: Performed by: NURSE ANESTHETIST, CERTIFIED REGISTERED

## 2018-05-24 PROCEDURE — 2720000010 HC SURG SUPPLY STERILE: Performed by: INTERNAL MEDICINE

## 2018-05-24 PROCEDURE — P9016 RBC LEUKOCYTES REDUCED: HCPCS

## 2018-05-24 PROCEDURE — 6360000002 HC RX W HCPCS: Performed by: NURSE ANESTHETIST, CERTIFIED REGISTERED

## 2018-05-24 PROCEDURE — 36430 TRANSFUSION BLD/BLD COMPNT: CPT

## 2018-05-24 PROCEDURE — 3700000001 HC ADD 15 MINUTES (ANESTHESIA): Performed by: INTERNAL MEDICINE

## 2018-05-24 PROCEDURE — 85027 COMPLETE CBC AUTOMATED: CPT

## 2018-05-24 PROCEDURE — C9113 INJ PANTOPRAZOLE SODIUM, VIA: HCPCS | Performed by: HOSPITALIST

## 2018-05-24 PROCEDURE — 36415 COLL VENOUS BLD VENIPUNCTURE: CPT

## 2018-05-24 PROCEDURE — 7100000001 HC PACU RECOVERY - ADDTL 15 MIN: Performed by: INTERNAL MEDICINE

## 2018-05-24 PROCEDURE — 99233 SBSQ HOSP IP/OBS HIGH 50: CPT | Performed by: INTERNAL MEDICINE

## 2018-05-24 PROCEDURE — 80048 BASIC METABOLIC PNL TOTAL CA: CPT

## 2018-05-24 PROCEDURE — 2580000003 HC RX 258: Performed by: INTERNAL MEDICINE

## 2018-05-24 PROCEDURE — 2580000003 HC RX 258: Performed by: HOSPITALIST

## 2018-05-24 PROCEDURE — 1200000003 HC TELEMETRY R&B

## 2018-05-24 PROCEDURE — 6370000000 HC RX 637 (ALT 250 FOR IP): Performed by: NURSE ANESTHETIST, CERTIFIED REGISTERED

## 2018-05-24 PROCEDURE — 7100000000 HC PACU RECOVERY - FIRST 15 MIN: Performed by: INTERNAL MEDICINE

## 2018-05-24 PROCEDURE — 93010 ELECTROCARDIOGRAM REPORT: CPT | Performed by: INTERNAL MEDICINE

## 2018-05-24 PROCEDURE — 3609012300 HC EGD BAND LIGATION ESOPHGEAL/GASTRIC VARICES: Performed by: INTERNAL MEDICINE

## 2018-05-24 PROCEDURE — 6360000002 HC RX W HCPCS: Performed by: INTERNAL MEDICINE

## 2018-05-24 PROCEDURE — 3700000000 HC ANESTHESIA ATTENDED CARE: Performed by: INTERNAL MEDICINE

## 2018-05-24 RX ORDER — LORAZEPAM 2 MG/ML
1 INJECTION INTRAMUSCULAR EVERY 6 HOURS PRN
Status: DISCONTINUED | OUTPATIENT
Start: 2018-05-24 | End: 2018-05-27 | Stop reason: HOSPADM

## 2018-05-24 RX ORDER — MORPHINE SULFATE 2 MG/ML
2 INJECTION, SOLUTION INTRAMUSCULAR; INTRAVENOUS EVERY 4 HOURS PRN
Status: DISPENSED | OUTPATIENT
Start: 2018-05-24 | End: 2018-05-25

## 2018-05-24 RX ORDER — CYANOCOBALAMIN 1000 UG/ML
1000 INJECTION INTRAMUSCULAR; SUBCUTANEOUS
Status: DISCONTINUED | OUTPATIENT
Start: 2018-06-17 | End: 2018-05-24

## 2018-05-24 RX ORDER — PROPOFOL 10 MG/ML
INJECTION, EMULSION INTRAVENOUS PRN
Status: DISCONTINUED | OUTPATIENT
Start: 2018-05-24 | End: 2018-05-24 | Stop reason: SDUPTHER

## 2018-05-24 RX ORDER — LIDOCAINE HYDROCHLORIDE 20 MG/ML
INJECTION, SOLUTION INFILTRATION; PERINEURAL PRN
Status: DISCONTINUED | OUTPATIENT
Start: 2018-05-24 | End: 2018-05-24 | Stop reason: SDUPTHER

## 2018-05-24 RX ORDER — QUETIAPINE FUMARATE 25 MG/1
50 TABLET, FILM COATED ORAL NIGHTLY
Status: DISCONTINUED | OUTPATIENT
Start: 2018-05-24 | End: 2018-05-27 | Stop reason: HOSPADM

## 2018-05-24 RX ORDER — MORPHINE SULFATE 2 MG/ML
2 INJECTION, SOLUTION INTRAMUSCULAR; INTRAVENOUS ONCE
Status: COMPLETED | OUTPATIENT
Start: 2018-05-24 | End: 2018-05-24

## 2018-05-24 RX ORDER — MELATONIN
1000 DAILY
COMMUNITY

## 2018-05-24 RX ORDER — 0.9 % SODIUM CHLORIDE 0.9 %
250 INTRAVENOUS SOLUTION INTRAVENOUS ONCE
Status: COMPLETED | OUTPATIENT
Start: 2018-05-24 | End: 2018-05-24

## 2018-05-24 RX ORDER — CYANOCOBALAMIN 1000 UG/ML
1000 INJECTION INTRAMUSCULAR; SUBCUTANEOUS
Status: ON HOLD | COMMUNITY
Start: 2018-05-31 | End: 2019-01-30 | Stop reason: ALTCHOICE

## 2018-05-24 RX ORDER — 0.9 % SODIUM CHLORIDE 0.9 %
250 INTRAVENOUS SOLUTION INTRAVENOUS ONCE
Status: DISCONTINUED | OUTPATIENT
Start: 2018-05-24 | End: 2018-05-27 | Stop reason: HOSPADM

## 2018-05-24 RX ORDER — SODIUM CHLORIDE, SODIUM LACTATE, POTASSIUM CHLORIDE, CALCIUM CHLORIDE 600; 310; 30; 20 MG/100ML; MG/100ML; MG/100ML; MG/100ML
INJECTION, SOLUTION INTRAVENOUS CONTINUOUS
Status: DISCONTINUED | OUTPATIENT
Start: 2018-05-24 | End: 2018-05-25

## 2018-05-24 RX ADMIN — LORAZEPAM 1 MG: 2 INJECTION INTRAMUSCULAR; INTRAVENOUS at 08:19

## 2018-05-24 RX ADMIN — ACETAMINOPHEN 650 MG: 325 TABLET ORAL at 01:59

## 2018-05-24 RX ADMIN — ONDANSETRON 4 MG: 2 INJECTION INTRAMUSCULAR; INTRAVENOUS at 20:56

## 2018-05-24 RX ADMIN — PROPOFOL 250 MG: 10 INJECTION, EMULSION INTRAVENOUS at 14:30

## 2018-05-24 RX ADMIN — OCTREOTIDE ACETATE 25 MCG/HR: 500 INJECTION, SOLUTION INTRAVENOUS; SUBCUTANEOUS at 03:00

## 2018-05-24 RX ADMIN — SODIUM CHLORIDE: 9 INJECTION, SOLUTION INTRAVENOUS at 08:38

## 2018-05-24 RX ADMIN — LORAZEPAM 1 MG: 2 INJECTION INTRAMUSCULAR; INTRAVENOUS at 01:36

## 2018-05-24 RX ADMIN — MORPHINE SULFATE 2 MG: 2 INJECTION, SOLUTION INTRAMUSCULAR; INTRAVENOUS at 15:42

## 2018-05-24 RX ADMIN — LORAZEPAM 1 MG: 2 INJECTION INTRAMUSCULAR; INTRAVENOUS at 23:27

## 2018-05-24 RX ADMIN — MORPHINE SULFATE 2 MG: 2 INJECTION, SOLUTION INTRAMUSCULAR; INTRAVENOUS at 19:46

## 2018-05-24 RX ADMIN — SODIUM CHLORIDE 8 MG/HR: 9 INJECTION, SOLUTION INTRAVENOUS at 23:27

## 2018-05-24 RX ADMIN — SODIUM CHLORIDE 250 ML: 9 INJECTION, SOLUTION INTRAVENOUS at 09:45

## 2018-05-24 RX ADMIN — ONDANSETRON 4 MG: 2 INJECTION INTRAMUSCULAR; INTRAVENOUS at 08:10

## 2018-05-24 RX ADMIN — MORPHINE SULFATE 2 MG: 2 INJECTION, SOLUTION INTRAMUSCULAR; INTRAVENOUS at 08:52

## 2018-05-24 RX ADMIN — SODIUM CHLORIDE 8 MG/HR: 9 INJECTION, SOLUTION INTRAVENOUS at 02:49

## 2018-05-24 RX ADMIN — OCTREOTIDE ACETATE 50 MCG: 50 INJECTION, SOLUTION INTRAVENOUS; SUBCUTANEOUS at 02:55

## 2018-05-24 RX ADMIN — BENZOCAINE 1 EACH: 200 SPRAY DENTAL; ORAL; PERIODONTAL at 14:30

## 2018-05-24 RX ADMIN — CEFTRIAXONE SODIUM 1 G: 1 INJECTION, POWDER, FOR SOLUTION INTRAMUSCULAR; INTRAVENOUS at 15:54

## 2018-05-24 RX ADMIN — Medication 10 ML: at 02:00

## 2018-05-24 RX ADMIN — LIDOCAINE HYDROCHLORIDE 100 MG: 20 INJECTION, SOLUTION INFILTRATION; PERINEURAL at 14:30

## 2018-05-24 RX ADMIN — SODIUM CHLORIDE: 9 INJECTION, SOLUTION INTRAVENOUS at 02:06

## 2018-05-24 RX ADMIN — SODIUM CHLORIDE, POTASSIUM CHLORIDE, SODIUM LACTATE AND CALCIUM CHLORIDE: 600; 310; 30; 20 INJECTION, SOLUTION INTRAVENOUS at 21:07

## 2018-05-24 RX ADMIN — LORAZEPAM 1 MG: 2 INJECTION INTRAMUSCULAR; INTRAVENOUS at 17:25

## 2018-05-24 ASSESSMENT — PAIN - FUNCTIONAL ASSESSMENT: PAIN_FUNCTIONAL_ASSESSMENT: 0-10

## 2018-05-24 ASSESSMENT — PAIN DESCRIPTION - LOCATION
LOCATION: BACK;ABDOMEN;THROAT
LOCATION: THROAT;ABDOMEN

## 2018-05-24 ASSESSMENT — PAIN SCALES - GENERAL
PAINLEVEL_OUTOF10: 10
PAINLEVEL_OUTOF10: 7
PAINLEVEL_OUTOF10: 5
PAINLEVEL_OUTOF10: 9
PAINLEVEL_OUTOF10: 9
PAINLEVEL_OUTOF10: 8
PAINLEVEL_OUTOF10: 9

## 2018-05-24 ASSESSMENT — PAIN DESCRIPTION - PAIN TYPE
TYPE: ACUTE PAIN
TYPE: ACUTE PAIN

## 2018-05-24 ASSESSMENT — PAIN DESCRIPTION - DESCRIPTORS: DESCRIPTORS: DISCOMFORT

## 2018-05-25 ENCOUNTER — APPOINTMENT (OUTPATIENT)
Dept: MRI IMAGING | Age: 38
DRG: 369 | End: 2018-05-25
Payer: MEDICARE

## 2018-05-25 LAB
ALBUMIN SERPL-MCNC: 3 G/DL (ref 3.5–5.1)
ALP BLD-CCNC: 131 U/L (ref 38–126)
ALT SERPL-CCNC: 27 U/L (ref 11–66)
ANION GAP SERPL CALCULATED.3IONS-SCNC: 9 MEQ/L (ref 8–16)
AST SERPL-CCNC: 101 U/L (ref 5–40)
BILIRUB SERPL-MCNC: 1.5 MG/DL (ref 0.3–1.2)
BUN BLDV-MCNC: 7 MG/DL (ref 7–22)
CALCIUM SERPL-MCNC: 8.1 MG/DL (ref 8.5–10.5)
CHLORIDE BLD-SCNC: 106 MEQ/L (ref 98–111)
CO2: 19 MEQ/L (ref 23–33)
CREAT SERPL-MCNC: 0.5 MG/DL (ref 0.4–1.2)
GFR SERPL CREATININE-BSD FRML MDRD: > 90 ML/MIN/1.73M2
GLUCOSE BLD-MCNC: 115 MG/DL (ref 70–108)
HCT VFR BLD CALC: 30.9 % (ref 37–47)
HEMOGLOBIN: 10.7 GM/DL (ref 12–16)
MCH RBC QN AUTO: 31.6 PG (ref 27–31)
MCHC RBC AUTO-ENTMCNC: 34.6 GM/DL (ref 33–37)
MCV RBC AUTO: 91.3 FL (ref 81–99)
PDW BLD-RTO: 17.7 % (ref 11.5–14.5)
PLATELET # BLD: 75 THOU/MM3 (ref 130–400)
PMV BLD AUTO: 8.2 FL (ref 7.4–10.4)
POTASSIUM SERPL-SCNC: 3.8 MEQ/L (ref 3.5–5.2)
RBC # BLD: 3.38 MILL/MM3 (ref 4.2–5.4)
SODIUM BLD-SCNC: 134 MEQ/L (ref 135–145)
TOTAL PROTEIN: 6.1 G/DL (ref 6.1–8)
WBC # BLD: 3.3 THOU/MM3 (ref 4.8–10.8)

## 2018-05-25 PROCEDURE — 6360000002 HC RX W HCPCS: Performed by: HOSPITALIST

## 2018-05-25 PROCEDURE — 6370000000 HC RX 637 (ALT 250 FOR IP): Performed by: INTERNAL MEDICINE

## 2018-05-25 PROCEDURE — 1200000003 HC TELEMETRY R&B

## 2018-05-25 PROCEDURE — 80053 COMPREHEN METABOLIC PANEL: CPT

## 2018-05-25 PROCEDURE — 6370000000 HC RX 637 (ALT 250 FOR IP): Performed by: HOSPITALIST

## 2018-05-25 PROCEDURE — 6360000002 HC RX W HCPCS: Performed by: INTERNAL MEDICINE

## 2018-05-25 PROCEDURE — C9113 INJ PANTOPRAZOLE SODIUM, VIA: HCPCS | Performed by: HOSPITALIST

## 2018-05-25 PROCEDURE — A9579 GAD-BASE MR CONTRAST NOS,1ML: HCPCS | Performed by: INTERNAL MEDICINE

## 2018-05-25 PROCEDURE — 74183 MRI ABD W/O CNTR FLWD CNTR: CPT

## 2018-05-25 PROCEDURE — 2580000003 HC RX 258: Performed by: HOSPITALIST

## 2018-05-25 PROCEDURE — 6360000004 HC RX CONTRAST MEDICATION: Performed by: INTERNAL MEDICINE

## 2018-05-25 PROCEDURE — 2580000003 HC RX 258: Performed by: INTERNAL MEDICINE

## 2018-05-25 PROCEDURE — 85027 COMPLETE CBC AUTOMATED: CPT

## 2018-05-25 PROCEDURE — 99233 SBSQ HOSP IP/OBS HIGH 50: CPT | Performed by: INTERNAL MEDICINE

## 2018-05-25 PROCEDURE — 36415 COLL VENOUS BLD VENIPUNCTURE: CPT

## 2018-05-25 RX ORDER — FUROSEMIDE 40 MG/1
40 TABLET ORAL DAILY
Status: DISCONTINUED | OUTPATIENT
Start: 2018-05-25 | End: 2018-05-27 | Stop reason: HOSPADM

## 2018-05-25 RX ORDER — THIAMINE MONONITRATE (VIT B1) 100 MG
100 TABLET ORAL DAILY
Status: DISCONTINUED | OUTPATIENT
Start: 2018-05-25 | End: 2018-05-27 | Stop reason: HOSPADM

## 2018-05-25 RX ADMIN — SPIRONOLACTONE 200 MG: 25 TABLET, FILM COATED ORAL at 08:51

## 2018-05-25 RX ADMIN — ACETAMINOPHEN 650 MG: 325 TABLET ORAL at 18:01

## 2018-05-25 RX ADMIN — SODIUM CHLORIDE 8 MG/HR: 9 INJECTION, SOLUTION INTRAVENOUS at 13:38

## 2018-05-25 RX ADMIN — SODIUM CHLORIDE, POTASSIUM CHLORIDE, SODIUM LACTATE AND CALCIUM CHLORIDE: 600; 310; 30; 20 INJECTION, SOLUTION INTRAVENOUS at 09:57

## 2018-05-25 RX ADMIN — RIFAXIMIN 550 MG: 550 TABLET ORAL at 08:51

## 2018-05-25 RX ADMIN — MORPHINE SULFATE 2 MG: 2 INJECTION, SOLUTION INTRAMUSCULAR; INTRAVENOUS at 01:54

## 2018-05-25 RX ADMIN — ONDANSETRON 4 MG: 2 INJECTION INTRAMUSCULAR; INTRAVENOUS at 04:36

## 2018-05-25 RX ADMIN — QUETIAPINE FUMARATE 50 MG: 25 TABLET ORAL at 20:27

## 2018-05-25 RX ADMIN — Medication 100 MG: at 12:51

## 2018-05-25 RX ADMIN — ACETAMINOPHEN 650 MG: 325 TABLET ORAL at 10:07

## 2018-05-25 RX ADMIN — GADOTERIDOL 10 ML: 279.3 INJECTION, SOLUTION INTRAVENOUS at 16:59

## 2018-05-25 RX ADMIN — NADOLOL 20 MG: 40 TABLET ORAL at 08:51

## 2018-05-25 RX ADMIN — Medication 10 ML: at 20:28

## 2018-05-25 RX ADMIN — LORAZEPAM 1 MG: 2 INJECTION INTRAMUSCULAR; INTRAVENOUS at 18:01

## 2018-05-25 RX ADMIN — FOLIC ACID 1 MG: 1 TABLET ORAL at 20:27

## 2018-05-25 RX ADMIN — RIFAXIMIN 550 MG: 550 TABLET ORAL at 20:29

## 2018-05-25 RX ADMIN — ONDANSETRON HYDROCHLORIDE 4 MG: 4 TABLET, FILM COATED ORAL at 05:14

## 2018-05-25 RX ADMIN — SODIUM CHLORIDE 8 MG/HR: 9 INJECTION, SOLUTION INTRAVENOUS at 05:25

## 2018-05-25 RX ADMIN — OCTREOTIDE ACETATE 25 MCG/HR: 500 INJECTION, SOLUTION INTRAVENOUS; SUBCUTANEOUS at 12:03

## 2018-05-25 RX ADMIN — FUROSEMIDE 40 MG: 40 TABLET ORAL at 12:51

## 2018-05-25 RX ADMIN — LEVOTHYROXINE SODIUM 88 MCG: 88 TABLET ORAL at 05:15

## 2018-05-25 RX ADMIN — Medication 10 ML: at 08:51

## 2018-05-25 RX ADMIN — SODIUM CHLORIDE 8 MG/HR: 9 INJECTION, SOLUTION INTRAVENOUS at 22:31

## 2018-05-25 RX ADMIN — ONDANSETRON 4 MG: 2 INJECTION INTRAMUSCULAR; INTRAVENOUS at 18:01

## 2018-05-25 ASSESSMENT — PAIN SCALES - GENERAL
PAINLEVEL_OUTOF10: 6
PAINLEVEL_OUTOF10: 6
PAINLEVEL_OUTOF10: 0
PAINLEVEL_OUTOF10: 0
PAINLEVEL_OUTOF10: 4

## 2018-05-25 ASSESSMENT — PAIN DESCRIPTION - DESCRIPTORS: DESCRIPTORS: HEADACHE

## 2018-05-25 ASSESSMENT — PAIN DESCRIPTION - PAIN TYPE: TYPE: ACUTE PAIN

## 2018-05-25 ASSESSMENT — PAIN DESCRIPTION - LOCATION: LOCATION: HEAD

## 2018-05-26 LAB
ANION GAP SERPL CALCULATED.3IONS-SCNC: 10 MEQ/L (ref 8–16)
ANISOCYTOSIS: ABNORMAL
BASOPHILS # BLD: 0.6 %
BASOPHILS ABSOLUTE: 0 THOU/MM3 (ref 0–0.1)
BUN BLDV-MCNC: 5 MG/DL (ref 7–22)
CALCIUM SERPL-MCNC: 7.7 MG/DL (ref 8.5–10.5)
CHLORIDE BLD-SCNC: 105 MEQ/L (ref 98–111)
CO2: 25 MEQ/L (ref 23–33)
CREAT SERPL-MCNC: 0.5 MG/DL (ref 0.4–1.2)
ELLIPTOCYTES: ABNORMAL
EOSINOPHIL # BLD: 2.8 %
EOSINOPHILS ABSOLUTE: 0.1 THOU/MM3 (ref 0–0.4)
GFR SERPL CREATININE-BSD FRML MDRD: > 90 ML/MIN/1.73M2
GLUCOSE BLD-MCNC: 193 MG/DL (ref 70–108)
HCT VFR BLD CALC: 27.7 % (ref 37–47)
HEMOGLOBIN: 9.2 GM/DL (ref 12–16)
LYMPHOCYTES # BLD: 44.5 %
LYMPHOCYTES ABSOLUTE: 1.3 THOU/MM3 (ref 1–4.8)
MAGNESIUM: 1.5 MG/DL (ref 1.6–2.4)
MCH RBC QN AUTO: 30.5 PG (ref 27–31)
MCHC RBC AUTO-ENTMCNC: 33.4 GM/DL (ref 33–37)
MCV RBC AUTO: 91.6 FL (ref 81–99)
MONOCYTES # BLD: 10 %
MONOCYTES ABSOLUTE: 0.3 THOU/MM3 (ref 0.4–1.3)
NUCLEATED RED BLOOD CELLS: 0 /100 WBC
PATHOLOGIST REVIEW: ABNORMAL
PDW BLD-RTO: 16.3 % (ref 11.5–14.5)
PLATELET # BLD: 77 THOU/MM3 (ref 130–400)
PLATELET ESTIMATE: ABNORMAL
PMV BLD AUTO: 9.2 FL (ref 7.4–10.4)
POIKILOCYTES: SLIGHT
POTASSIUM REFLEX MAGNESIUM: 3.3 MEQ/L (ref 3.5–5.2)
RBC # BLD: 3.02 MILL/MM3 (ref 4.2–5.4)
SCAN OF BLOOD SMEAR: NORMAL
SEG NEUTROPHILS: 42.1 %
SEGMENTED NEUTROPHILS ABSOLUTE COUNT: 1.2 THOU/MM3 (ref 1.8–7.7)
SODIUM BLD-SCNC: 140 MEQ/L (ref 135–145)
TARGET CELLS: ABNORMAL
WBC # BLD: 2.9 THOU/MM3 (ref 4.8–10.8)

## 2018-05-26 PROCEDURE — 36415 COLL VENOUS BLD VENIPUNCTURE: CPT

## 2018-05-26 PROCEDURE — 80048 BASIC METABOLIC PNL TOTAL CA: CPT

## 2018-05-26 PROCEDURE — C9113 INJ PANTOPRAZOLE SODIUM, VIA: HCPCS | Performed by: HOSPITALIST

## 2018-05-26 PROCEDURE — 2580000003 HC RX 258: Performed by: HOSPITALIST

## 2018-05-26 PROCEDURE — 99232 SBSQ HOSP IP/OBS MODERATE 35: CPT | Performed by: INTERNAL MEDICINE

## 2018-05-26 PROCEDURE — 1200000003 HC TELEMETRY R&B

## 2018-05-26 PROCEDURE — 6360000002 HC RX W HCPCS: Performed by: HOSPITALIST

## 2018-05-26 PROCEDURE — 6370000000 HC RX 637 (ALT 250 FOR IP): Performed by: HOSPITALIST

## 2018-05-26 PROCEDURE — 83735 ASSAY OF MAGNESIUM: CPT

## 2018-05-26 PROCEDURE — 85025 COMPLETE CBC W/AUTO DIFF WBC: CPT

## 2018-05-26 PROCEDURE — 6370000000 HC RX 637 (ALT 250 FOR IP): Performed by: INTERNAL MEDICINE

## 2018-05-26 RX ORDER — PANTOPRAZOLE SODIUM 40 MG/1
40 TABLET, DELAYED RELEASE ORAL
Status: DISCONTINUED | OUTPATIENT
Start: 2018-05-26 | End: 2018-05-27 | Stop reason: HOSPADM

## 2018-05-26 RX ORDER — POTASSIUM CHLORIDE 20 MEQ/1
40 TABLET, EXTENDED RELEASE ORAL ONCE
Status: COMPLETED | OUTPATIENT
Start: 2018-05-26 | End: 2018-05-26

## 2018-05-26 RX ADMIN — ONDANSETRON 4 MG: 2 INJECTION INTRAMUSCULAR; INTRAVENOUS at 22:03

## 2018-05-26 RX ADMIN — NADOLOL 20 MG: 40 TABLET ORAL at 07:51

## 2018-05-26 RX ADMIN — LORAZEPAM 1 MG: 2 INJECTION INTRAMUSCULAR; INTRAVENOUS at 22:03

## 2018-05-26 RX ADMIN — Medication 10 ML: at 07:51

## 2018-05-26 RX ADMIN — ACETAMINOPHEN 650 MG: 325 TABLET ORAL at 08:05

## 2018-05-26 RX ADMIN — RIFAXIMIN 550 MG: 550 TABLET ORAL at 21:10

## 2018-05-26 RX ADMIN — POTASSIUM CHLORIDE 40 MEQ: 20 TABLET, EXTENDED RELEASE ORAL at 11:15

## 2018-05-26 RX ADMIN — PANTOPRAZOLE SODIUM 40 MG: 40 TABLET, DELAYED RELEASE ORAL at 16:10

## 2018-05-26 RX ADMIN — LEVOTHYROXINE SODIUM 88 MCG: 88 TABLET ORAL at 04:30

## 2018-05-26 RX ADMIN — SPIRONOLACTONE 200 MG: 25 TABLET, FILM COATED ORAL at 07:50

## 2018-05-26 RX ADMIN — Medication 15 ML: at 10:17

## 2018-05-26 RX ADMIN — Medication 100 MG: at 07:51

## 2018-05-26 RX ADMIN — ACETAMINOPHEN 650 MG: 325 TABLET ORAL at 04:12

## 2018-05-26 RX ADMIN — RIFAXIMIN 550 MG: 550 TABLET ORAL at 07:50

## 2018-05-26 RX ADMIN — QUETIAPINE FUMARATE 50 MG: 25 TABLET ORAL at 21:10

## 2018-05-26 RX ADMIN — SODIUM CHLORIDE 8 MG/HR: 9 INJECTION, SOLUTION INTRAVENOUS at 07:24

## 2018-05-26 RX ADMIN — Medication 10 ML: at 21:10

## 2018-05-26 RX ADMIN — FUROSEMIDE 40 MG: 40 TABLET ORAL at 07:51

## 2018-05-26 RX ADMIN — ONDANSETRON 4 MG: 2 INJECTION INTRAMUSCULAR; INTRAVENOUS at 08:05

## 2018-05-26 RX ADMIN — FOLIC ACID 1 MG: 1 TABLET ORAL at 21:10

## 2018-05-26 RX ADMIN — ACETAMINOPHEN 650 MG: 325 TABLET ORAL at 22:03

## 2018-05-26 ASSESSMENT — PAIN DESCRIPTION - LOCATION: LOCATION: ABDOMEN

## 2018-05-26 ASSESSMENT — PAIN DESCRIPTION - PAIN TYPE
TYPE: ACUTE PAIN

## 2018-05-26 ASSESSMENT — PAIN SCALES - GENERAL
PAINLEVEL_OUTOF10: 4
PAINLEVEL_OUTOF10: 2
PAINLEVEL_OUTOF10: 6
PAINLEVEL_OUTOF10: 4
PAINLEVEL_OUTOF10: 2
PAINLEVEL_OUTOF10: 5
PAINLEVEL_OUTOF10: 3

## 2018-05-26 ASSESSMENT — PAIN DESCRIPTION - DESCRIPTORS
DESCRIPTORS: DISCOMFORT
DESCRIPTORS: SQUEEZING

## 2018-05-27 VITALS
TEMPERATURE: 98.2 F | HEART RATE: 102 BPM | SYSTOLIC BLOOD PRESSURE: 104 MMHG | DIASTOLIC BLOOD PRESSURE: 62 MMHG | BODY MASS INDEX: 21.16 KG/M2 | RESPIRATION RATE: 18 BRPM | HEIGHT: 65 IN | OXYGEN SATURATION: 99 % | WEIGHT: 127 LBS

## 2018-05-27 LAB
ANION GAP SERPL CALCULATED.3IONS-SCNC: 10 MEQ/L (ref 8–16)
ANISOCYTOSIS: ABNORMAL
BASOPHILS # BLD: 0.6 %
BASOPHILS ABSOLUTE: 0 THOU/MM3 (ref 0–0.1)
BUN BLDV-MCNC: 5 MG/DL (ref 7–22)
CALCIUM SERPL-MCNC: 7.9 MG/DL (ref 8.5–10.5)
CHLORIDE BLD-SCNC: 107 MEQ/L (ref 98–111)
CO2: 27 MEQ/L (ref 23–33)
CREAT SERPL-MCNC: 0.5 MG/DL (ref 0.4–1.2)
EOSINOPHIL # BLD: 1.6 %
EOSINOPHILS ABSOLUTE: 0.1 THOU/MM3 (ref 0–0.4)
GFR SERPL CREATININE-BSD FRML MDRD: > 90 ML/MIN/1.73M2
GLUCOSE BLD-MCNC: 107 MG/DL (ref 70–108)
HCT VFR BLD CALC: 28.9 % (ref 37–47)
HEMOGLOBIN: 9.7 GM/DL (ref 12–16)
LYMPHOCYTES # BLD: 50.1 %
LYMPHOCYTES ABSOLUTE: 1.9 THOU/MM3 (ref 1–4.8)
MCH RBC QN AUTO: 30.9 PG (ref 27–31)
MCHC RBC AUTO-ENTMCNC: 33.5 GM/DL (ref 33–37)
MCV RBC AUTO: 92.3 FL (ref 81–99)
MONOCYTES # BLD: 8.1 %
MONOCYTES ABSOLUTE: 0.3 THOU/MM3 (ref 0.4–1.3)
NUCLEATED RED BLOOD CELLS: 0 /100 WBC
OVALOCYTES: ABNORMAL
PDW BLD-RTO: 17.1 % (ref 11.5–14.5)
PLATELET # BLD: 84 THOU/MM3 (ref 130–400)
PLATELET ESTIMATE: ABNORMAL
PMV BLD AUTO: 9.2 FL (ref 7.4–10.4)
POIKILOCYTES: SLIGHT
POTASSIUM SERPL-SCNC: 3.5 MEQ/L (ref 3.5–5.2)
RBC # BLD: 3.14 MILL/MM3 (ref 4.2–5.4)
SCAN OF BLOOD SMEAR: NORMAL
SEG NEUTROPHILS: 39.6 %
SEGMENTED NEUTROPHILS ABSOLUTE COUNT: 1.5 THOU/MM3 (ref 1.8–7.7)
SODIUM BLD-SCNC: 144 MEQ/L (ref 135–145)
TARGET CELLS: ABNORMAL
WBC # BLD: 3.8 THOU/MM3 (ref 4.8–10.8)

## 2018-05-27 PROCEDURE — 36415 COLL VENOUS BLD VENIPUNCTURE: CPT

## 2018-05-27 PROCEDURE — 6360000002 HC RX W HCPCS: Performed by: HOSPITALIST

## 2018-05-27 PROCEDURE — 6370000000 HC RX 637 (ALT 250 FOR IP): Performed by: HOSPITALIST

## 2018-05-27 PROCEDURE — 85025 COMPLETE CBC W/AUTO DIFF WBC: CPT

## 2018-05-27 PROCEDURE — 6370000000 HC RX 637 (ALT 250 FOR IP): Performed by: INTERNAL MEDICINE

## 2018-05-27 PROCEDURE — 80048 BASIC METABOLIC PNL TOTAL CA: CPT

## 2018-05-27 PROCEDURE — 99239 HOSP IP/OBS DSCHRG MGMT >30: CPT | Performed by: INTERNAL MEDICINE

## 2018-05-27 RX ADMIN — RIFAXIMIN 550 MG: 550 TABLET ORAL at 09:35

## 2018-05-27 RX ADMIN — SPIRONOLACTONE 200 MG: 25 TABLET, FILM COATED ORAL at 09:34

## 2018-05-27 RX ADMIN — FUROSEMIDE 40 MG: 40 TABLET ORAL at 09:34

## 2018-05-27 RX ADMIN — Medication 100 MG: at 09:46

## 2018-05-27 RX ADMIN — NADOLOL 20 MG: 40 TABLET ORAL at 09:40

## 2018-05-27 RX ADMIN — LEVOTHYROXINE SODIUM 88 MCG: 88 TABLET ORAL at 07:16

## 2018-05-27 RX ADMIN — ONDANSETRON HYDROCHLORIDE 4 MG: 4 TABLET, FILM COATED ORAL at 09:35

## 2018-05-27 RX ADMIN — PANTOPRAZOLE SODIUM 40 MG: 40 TABLET, DELAYED RELEASE ORAL at 09:44

## 2018-05-27 ASSESSMENT — PAIN SCALES - GENERAL: PAINLEVEL_OUTOF10: 2

## 2018-05-27 ASSESSMENT — PAIN DESCRIPTION - DESCRIPTORS: DESCRIPTORS: DISCOMFORT

## 2018-06-26 ENCOUNTER — HOSPITAL ENCOUNTER (OUTPATIENT)
Age: 38
Setting detail: OUTPATIENT SURGERY
Discharge: HOME | End: 2018-06-26
Attending: INTERNAL MEDICINE | Admitting: INTERNAL MEDICINE
Payer: MEDICARE

## 2018-06-26 ENCOUNTER — ANESTHESIA EVENT (OUTPATIENT)
Dept: ENDOSCOPY | Age: 38
End: 2018-06-26
Payer: MEDICARE

## 2018-06-26 ENCOUNTER — ANESTHESIA (OUTPATIENT)
Dept: ENDOSCOPY | Age: 38
End: 2018-06-26
Payer: MEDICARE

## 2018-06-26 VITALS
WEIGHT: 125.6 LBS | DIASTOLIC BLOOD PRESSURE: 67 MMHG | RESPIRATION RATE: 16 BRPM | SYSTOLIC BLOOD PRESSURE: 110 MMHG | TEMPERATURE: 98 F | HEART RATE: 81 BPM | OXYGEN SATURATION: 100 % | BODY MASS INDEX: 20.93 KG/M2 | HEIGHT: 65 IN

## 2018-06-26 VITALS
RESPIRATION RATE: 11 BRPM | OXYGEN SATURATION: 100 % | DIASTOLIC BLOOD PRESSURE: 71 MMHG | SYSTOLIC BLOOD PRESSURE: 110 MMHG

## 2018-06-26 PROCEDURE — 6360000002 HC RX W HCPCS: Performed by: NURSE ANESTHETIST, CERTIFIED REGISTERED

## 2018-06-26 PROCEDURE — 3700000000 HC ANESTHESIA ATTENDED CARE: Performed by: INTERNAL MEDICINE

## 2018-06-26 PROCEDURE — 2580000003 HC RX 258: Performed by: INTERNAL MEDICINE

## 2018-06-26 PROCEDURE — 3700000001 HC ADD 15 MINUTES (ANESTHESIA): Performed by: INTERNAL MEDICINE

## 2018-06-26 PROCEDURE — 3609012300 HC EGD BAND LIGATION ESOPHGEAL/GASTRIC VARICES: Performed by: INTERNAL MEDICINE

## 2018-06-26 PROCEDURE — 7100000001 HC PACU RECOVERY - ADDTL 15 MIN: Performed by: INTERNAL MEDICINE

## 2018-06-26 PROCEDURE — 7100000000 HC PACU RECOVERY - FIRST 15 MIN: Performed by: INTERNAL MEDICINE

## 2018-06-26 RX ORDER — PROPOFOL 10 MG/ML
INJECTION, EMULSION INTRAVENOUS PRN
Status: DISCONTINUED | OUTPATIENT
Start: 2018-06-26 | End: 2018-06-26 | Stop reason: SDUPTHER

## 2018-06-26 RX ORDER — SODIUM CHLORIDE 450 MG/100ML
INJECTION, SOLUTION INTRAVENOUS CONTINUOUS
Status: DISCONTINUED | OUTPATIENT
Start: 2018-06-26 | End: 2018-06-26 | Stop reason: HOSPADM

## 2018-06-26 RX ADMIN — SODIUM CHLORIDE: 4.5 INJECTION, SOLUTION INTRAVENOUS at 12:51

## 2018-06-26 RX ADMIN — PROPOFOL 20 MG: 10 INJECTION, EMULSION INTRAVENOUS at 13:27

## 2018-06-26 RX ADMIN — PROPOFOL 50 MG: 10 INJECTION, EMULSION INTRAVENOUS at 13:25

## 2018-06-26 RX ADMIN — PROPOFOL 30 MG: 10 INJECTION, EMULSION INTRAVENOUS at 13:30

## 2018-06-26 RX ADMIN — PROPOFOL 100 MG: 10 INJECTION, EMULSION INTRAVENOUS at 13:23

## 2018-06-26 ASSESSMENT — PAIN SCALES - GENERAL
PAINLEVEL_OUTOF10: 0
PAINLEVEL_OUTOF10: 0

## 2018-06-26 ASSESSMENT — PAIN - FUNCTIONAL ASSESSMENT: PAIN_FUNCTIONAL_ASSESSMENT: 0-10

## 2018-10-23 ENCOUNTER — ANESTHESIA EVENT (OUTPATIENT)
Dept: ENDOSCOPY | Age: 38
End: 2018-10-23
Payer: MEDICARE

## 2018-10-23 ENCOUNTER — ANESTHESIA (OUTPATIENT)
Dept: ENDOSCOPY | Age: 38
End: 2018-10-23
Payer: MEDICARE

## 2018-10-23 ENCOUNTER — HOSPITAL ENCOUNTER (OUTPATIENT)
Age: 38
Setting detail: OUTPATIENT SURGERY
Discharge: HOME | End: 2018-10-23
Attending: INTERNAL MEDICINE | Admitting: INTERNAL MEDICINE
Payer: MEDICARE

## 2018-10-23 VITALS
SYSTOLIC BLOOD PRESSURE: 116 MMHG | HEART RATE: 85 BPM | BODY MASS INDEX: 23.16 KG/M2 | WEIGHT: 139 LBS | OXYGEN SATURATION: 96 % | TEMPERATURE: 99.4 F | HEIGHT: 65 IN | DIASTOLIC BLOOD PRESSURE: 75 MMHG | RESPIRATION RATE: 15 BRPM

## 2018-10-23 VITALS
OXYGEN SATURATION: 95 % | DIASTOLIC BLOOD PRESSURE: 78 MMHG | RESPIRATION RATE: 14 BRPM | SYSTOLIC BLOOD PRESSURE: 128 MMHG

## 2018-10-23 LAB — PREGNANCY, URINE: NEGATIVE

## 2018-10-23 PROCEDURE — 3609012300 HC EGD BAND LIGATION ESOPHGEAL/GASTRIC VARICES: Performed by: INTERNAL MEDICINE

## 2018-10-23 PROCEDURE — 2709999900 HC NON-CHARGEABLE SUPPLY: Performed by: INTERNAL MEDICINE

## 2018-10-23 PROCEDURE — 2720000010 HC SURG SUPPLY STERILE: Performed by: INTERNAL MEDICINE

## 2018-10-23 PROCEDURE — 3700000001 HC ADD 15 MINUTES (ANESTHESIA): Performed by: INTERNAL MEDICINE

## 2018-10-23 PROCEDURE — 81025 URINE PREGNANCY TEST: CPT

## 2018-10-23 PROCEDURE — 2500000003 HC RX 250 WO HCPCS: Performed by: NURSE ANESTHETIST, CERTIFIED REGISTERED

## 2018-10-23 PROCEDURE — 7100000001 HC PACU RECOVERY - ADDTL 15 MIN: Performed by: INTERNAL MEDICINE

## 2018-10-23 PROCEDURE — 3609017100 HC EGD: Performed by: INTERNAL MEDICINE

## 2018-10-23 PROCEDURE — 6360000002 HC RX W HCPCS: Performed by: NURSE ANESTHETIST, CERTIFIED REGISTERED

## 2018-10-23 PROCEDURE — 2580000003 HC RX 258: Performed by: INTERNAL MEDICINE

## 2018-10-23 PROCEDURE — 7100000000 HC PACU RECOVERY - FIRST 15 MIN: Performed by: INTERNAL MEDICINE

## 2018-10-23 PROCEDURE — 2580000003 HC RX 258: Performed by: NURSE ANESTHETIST, CERTIFIED REGISTERED

## 2018-10-23 PROCEDURE — 3700000000 HC ANESTHESIA ATTENDED CARE: Performed by: INTERNAL MEDICINE

## 2018-10-23 RX ORDER — PROPOFOL 10 MG/ML
INJECTION, EMULSION INTRAVENOUS PRN
Status: DISCONTINUED | OUTPATIENT
Start: 2018-10-23 | End: 2018-10-23 | Stop reason: SDUPTHER

## 2018-10-23 RX ORDER — SODIUM CHLORIDE 9 MG/ML
INJECTION, SOLUTION INTRAVENOUS CONTINUOUS PRN
Status: DISCONTINUED | OUTPATIENT
Start: 2018-10-23 | End: 2018-10-23 | Stop reason: SDUPTHER

## 2018-10-23 RX ORDER — ACETAMINOPHEN 325 MG/1
650 TABLET ORAL EVERY 6 HOURS PRN
Status: ON HOLD | COMMUNITY
End: 2019-01-22

## 2018-10-23 RX ORDER — LIDOCAINE HYDROCHLORIDE 20 MG/ML
INJECTION, SOLUTION INFILTRATION; PERINEURAL PRN
Status: DISCONTINUED | OUTPATIENT
Start: 2018-10-23 | End: 2018-10-23 | Stop reason: SDUPTHER

## 2018-10-23 RX ORDER — SODIUM CHLORIDE 450 MG/100ML
INJECTION, SOLUTION INTRAVENOUS CONTINUOUS
Status: DISCONTINUED | OUTPATIENT
Start: 2018-10-23 | End: 2018-10-23 | Stop reason: HOSPADM

## 2018-10-23 RX ADMIN — SODIUM CHLORIDE: 4.5 INJECTION, SOLUTION INTRAVENOUS at 12:21

## 2018-10-23 RX ADMIN — PROPOFOL 100 MG: 10 INJECTION, EMULSION INTRAVENOUS at 13:11

## 2018-10-23 RX ADMIN — SODIUM CHLORIDE: 9 INJECTION, SOLUTION INTRAVENOUS at 13:09

## 2018-10-23 RX ADMIN — LIDOCAINE HYDROCHLORIDE 80 MG: 20 INJECTION, SOLUTION INFILTRATION; PERINEURAL at 13:11

## 2018-10-23 RX ADMIN — LIDOCAINE HYDROCHLORIDE 40 MG: 20 INJECTION, SOLUTION INFILTRATION; PERINEURAL at 13:15

## 2018-10-23 ASSESSMENT — PAIN DESCRIPTION - PAIN TYPE: TYPE: ACUTE PAIN;CHRONIC PAIN

## 2018-10-23 ASSESSMENT — PAIN SCALES - GENERAL: PAINLEVEL_OUTOF10: 1

## 2018-10-23 NOTE — ANESTHESIA PRE PROCEDURE
NEGATIVE 10/23/2018    PREGSERUM NEGATIVE 01/23/2017        ABGs: No results found for: PHART, PO2ART, PUW8FPT, VIE7CGH, BEART, B7CJYAWJ     Type & Screen (If Applicable):  Lab Results   Component Value Date    [address] POS 05/24/2018       Anesthesia Evaluation  Patient summary reviewed and Nursing notes reviewed  Airway: Mallampati: II  TM distance: >3 FB   Neck ROM: full  Mouth opening: > = 3 FB Dental:          Pulmonary:                              Cardiovascular:  Exercise tolerance: good (>4 METS),   (+) hypertension: mild,       ECG reviewed                        Neuro/Psych:   (+) psychiatric history: stable with treatment            GI/Hepatic/Renal:   (+) liver disease: esophageal varices,           Endo/Other:    (+) hypothyroidism::., .                 Abdominal:           Vascular:                                        Anesthesia Plan      MAC     ASA 3       Induction: intravenous. Anesthetic plan and risks discussed with patient. Plan discussed with CRNA and attending.                   ALANIS Wynne - CRNA   10/23/2018

## 2018-10-24 NOTE — PROCEDURES
months.         Ty Kurtz M.D.    D: 10/23/2018 13:34:33       T: 10/23/2018 15:47:48     [email or web address]  Job#: [id number]     Doc#: [id number]    CC:  Family Physician

## 2018-12-07 ENCOUNTER — HOSPITAL ENCOUNTER (OUTPATIENT)
Dept: ULTRASOUND IMAGING | Age: 38
Discharge: HOME | End: 2018-12-07
Payer: MEDICARE

## 2018-12-07 DIAGNOSIS — K74.60 CIRRHOSIS OF LIVER WITH ASCITES, UNSPECIFIED HEPATIC CIRRHOSIS TYPE (HCC): ICD-10-CM

## 2018-12-07 DIAGNOSIS — R18.8 CIRRHOSIS OF LIVER WITH ASCITES, UNSPECIFIED HEPATIC CIRRHOSIS TYPE (HCC): ICD-10-CM

## 2018-12-07 PROCEDURE — 76705 ECHO EXAM OF ABDOMEN: CPT

## 2018-12-07 PROCEDURE — 93975 VASCULAR STUDY: CPT

## 2018-12-15 ENCOUNTER — HOSPITAL ENCOUNTER (OUTPATIENT)
Age: 38
Discharge: HOME | End: 2018-12-15
Payer: MEDICARE

## 2018-12-15 LAB
ALBUMIN SERPL-MCNC: 3.5 G/DL (ref 3.5–5.1)
ALP BLD-CCNC: 211 U/L (ref 38–126)
ALT SERPL-CCNC: 45 U/L (ref 11–66)
AMMONIA: 97 UMOL/L (ref 11–60)
AMYLASE: 73 U/L (ref 20–104)
ANION GAP SERPL CALCULATED.3IONS-SCNC: 16 MEQ/L (ref 8–16)
AST SERPL-CCNC: 256 U/L (ref 5–40)
BILIRUB SERPL-MCNC: 2.8 MG/DL (ref 0.3–1.2)
BUN BLDV-MCNC: 4 MG/DL (ref 7–22)
CALCIUM SERPL-MCNC: 8.2 MG/DL (ref 8.5–10.5)
CHLORIDE BLD-SCNC: 99 MEQ/L (ref 98–111)
CO2: 27 MEQ/L (ref 23–33)
CREAT SERPL-MCNC: 0.4 MG/DL (ref 0.4–1.2)
GFR SERPL CREATININE-BSD FRML MDRD: > 90 ML/MIN/1.73M2
GLUCOSE BLD-MCNC: 101 MG/DL (ref 70–108)
INR BLD: 1.48 (ref 0.85–1.13)
LIPASE: 43.5 U/L (ref 5.6–51.3)
POTASSIUM SERPL-SCNC: 3.5 MEQ/L (ref 3.5–5.2)
SCAN OF BLOOD SMEAR: NORMAL
SODIUM BLD-SCNC: 142 MEQ/L (ref 135–145)
TOTAL PROTEIN: 7.3 G/DL (ref 6.1–8)
TSH SERPL DL<=0.05 MIU/L-ACNC: 5.48 UIU/ML (ref 0.4–4.2)

## 2018-12-15 PROCEDURE — 82150 ASSAY OF AMYLASE: CPT

## 2018-12-15 PROCEDURE — 83690 ASSAY OF LIPASE: CPT

## 2018-12-15 PROCEDURE — 82140 ASSAY OF AMMONIA: CPT

## 2018-12-15 PROCEDURE — 85610 PROTHROMBIN TIME: CPT

## 2018-12-15 PROCEDURE — 85025 COMPLETE CBC W/AUTO DIFF WBC: CPT

## 2018-12-15 PROCEDURE — 36415 COLL VENOUS BLD VENIPUNCTURE: CPT

## 2018-12-15 PROCEDURE — 80053 COMPREHEN METABOLIC PANEL: CPT

## 2018-12-15 PROCEDURE — 82105 ALPHA-FETOPROTEIN SERUM: CPT

## 2018-12-15 PROCEDURE — 84443 ASSAY THYROID STIM HORMONE: CPT

## 2018-12-16 LAB
AFP-TUMOR MARKER: 9.7 UG/L
ANISOCYTOSIS: PRESENT
BASOPHILS # BLD: 0.2 %
BASOPHILS ABSOLUTE: 0 THOU/MM3 (ref 0–0.1)
EOSINOPHIL # BLD: 2.1 %
EOSINOPHILS ABSOLUTE: 0.2 THOU/MM3 (ref 0–0.4)
ERYTHROCYTE [DISTWIDTH] IN BLOOD BY AUTOMATED COUNT: 25.4 % (ref 11.5–14.5)
ERYTHROCYTE [DISTWIDTH] IN BLOOD BY AUTOMATED COUNT: 75.3 FL (ref 35–45)
HCT VFR BLD CALC: 26 % (ref 37–47)
HEMOGLOBIN: 7.8 GM/DL (ref 12–16)
IMMATURE GRANS (ABS): 0.04 THOU/MM3 (ref 0–0.07)
IMMATURE GRANULOCYTES: 0.5 %
LYMPHOCYTES # BLD: 43.5 %
LYMPHOCYTES ABSOLUTE: 3.5 THOU/MM3 (ref 1–4.8)
MCH RBC QN AUTO: 25.1 PG (ref 26–33)
MCHC RBC AUTO-ENTMCNC: 30 GM/DL (ref 32.2–35.5)
MCV RBC AUTO: 83.6 FL (ref 81–99)
MONOCYTES # BLD: 6.6 %
MONOCYTES ABSOLUTE: 0.5 THOU/MM3 (ref 0.4–1.3)
NUCLEATED RED BLOOD CELLS: 0 /100 WBC
PATHOLOGIST REVIEW: ABNORMAL
PLATELET # BLD: 98 THOU/MM3 (ref 130–400)
PLATELET ESTIMATE: ABNORMAL
PMV BLD AUTO: 12 FL (ref 9.4–12.4)
RBC # BLD: 3.11 MILL/MM3 (ref 4.2–5.4)
SEG NEUTROPHILS: 47.1 %
SEGMENTED NEUTROPHILS ABSOLUTE COUNT: 3.8 THOU/MM3 (ref 1.8–7.7)
TARGET CELLS: ABNORMAL
WBC # BLD: 8.1 THOU/MM3 (ref 4.8–10.8)

## 2019-01-14 ENCOUNTER — HOSPITAL ENCOUNTER (INPATIENT)
Age: 39
LOS: 8 days | Discharge: NURSING, REHAB OR LONG-TERM CARE | DRG: 433 | End: 2019-01-22
Attending: FAMILY MEDICINE | Admitting: ANESTHESIOLOGY
Payer: MEDICARE

## 2019-01-14 DIAGNOSIS — R94.31 PROLONGED Q-T INTERVAL ON ECG: ICD-10-CM

## 2019-01-14 DIAGNOSIS — E87.1 ACUTE HYPONATREMIA: ICD-10-CM

## 2019-01-14 DIAGNOSIS — E87.6 HYPOKALEMIA: ICD-10-CM

## 2019-01-14 DIAGNOSIS — E80.6 HYPERBILIRUBINEMIA: ICD-10-CM

## 2019-01-14 DIAGNOSIS — K70.30 ALCOHOLIC CIRRHOSIS OF LIVER WITHOUT ASCITES (HCC): ICD-10-CM

## 2019-01-14 DIAGNOSIS — F10.10 ALCOHOL ABUSE: Primary | ICD-10-CM

## 2019-01-14 DIAGNOSIS — K70.9 ALCOHOLIC LIVER DISEASE (HCC): ICD-10-CM

## 2019-01-14 PROBLEM — D72.829 LEUKOCYTOSIS: Status: ACTIVE | Noted: 2019-01-14

## 2019-01-14 PROBLEM — E03.9 HYPOTHYROIDISM: Status: ACTIVE | Noted: 2019-01-14

## 2019-01-14 PROBLEM — K74.60 CIRRHOSIS OF LIVER (HCC): Status: ACTIVE | Noted: 2019-01-14

## 2019-01-14 LAB
ALBUMIN SERPL-MCNC: 2.2 GM/DL (ref 3.4–5)
ALP BLD-CCNC: 276 U/L (ref 46–116)
ALT SERPL-CCNC: 64 U/L (ref 14–63)
ANION GAP: 16 MEQ/L (ref 8–16)
ANISOCYTOSIS: ABNORMAL
AST SERPL-CCNC: 267 U/L (ref 15–37)
BASOPHILS # BLD: 0.5 % (ref 0–3)
BILIRUB SERPL-MCNC: 11.4 MG/DL (ref 0.2–1)
BUN BLDV-MCNC: 2 MG/DL (ref 7–18)
CHLORIDE BLD-SCNC: < 65 MEQ/L (ref 98–107)
CO2: 25 MEQ/L (ref 21–32)
CREAT SERPL-MCNC: 0.7 MG/DL (ref 0.6–1.3)
EOSINOPHILS RELATIVE PERCENT: 0.7 % (ref 0–4)
GFR, ESTIMATED: > 90 ML/MIN/1.73M2
GLUCOSE BLD-MCNC: 140 MG/DL (ref 74–106)
HCT VFR BLD CALC: 30.7 % (ref 37–47)
HEMOGLOBIN: 10.7 GM/DL (ref 12–16)
LYMPHOCYTES # BLD: 5.6 % (ref 15–47)
MCH RBC QN AUTO: 31.2 PG (ref 27–31)
MCHC RBC AUTO-ENTMCNC: 34.8 GM/DL (ref 33–37)
MCV RBC AUTO: 89.6 FL (ref 81–99)
MONOCYTES: 9.4 % (ref 0–12)
PDW BLD-RTO: 20.8 % (ref 11.5–14.5)
PLATELET # BLD: 176 THOU/MM3 (ref 130–400)
PLATELET ESTIMATE: ABNORMAL
PMV BLD AUTO: 9 FL (ref 7.4–10.4)
POC CALCIUM: 7.8 MG/DL (ref 8.5–10.1)
POIKILOCYTES: ABNORMAL
POTASSIUM SERPL-SCNC: 2.9 MEQ/L (ref 3.5–5.1)
RBC # BLD: 3.42 MILL/MM3 (ref 4.2–5.4)
RBC # BLD: ABNORMAL 10*6/UL
SCAN OF BLOOD SMEAR: NORMAL
SEGS: 83.8 % (ref 43–75)
SODIUM BLD-SCNC: 106 MEQ/L (ref 136–145)
TARGET CELLS: ABNORMAL
TOTAL PROTEIN: 7 GM/DL (ref 6.4–8.2)
WBC # BLD: 12 THOU/MM3 (ref 4.8–10.8)

## 2019-01-14 PROCEDURE — 87500 VANOMYCIN DNA AMP PROBE: CPT

## 2019-01-14 PROCEDURE — 6370000000 HC RX 637 (ALT 250 FOR IP): Performed by: FAMILY MEDICINE

## 2019-01-14 PROCEDURE — 2709999900 HC NON-CHARGEABLE SUPPLY

## 2019-01-14 PROCEDURE — 87641 MR-STAPH DNA AMP PROBE: CPT

## 2019-01-14 PROCEDURE — 6360000002 HC RX W HCPCS

## 2019-01-14 PROCEDURE — 80053 COMPREHEN METABOLIC PANEL: CPT

## 2019-01-14 PROCEDURE — 36415 COLL VENOUS BLD VENIPUNCTURE: CPT

## 2019-01-14 PROCEDURE — 99285 EMERGENCY DEPT VISIT HI MDM: CPT

## 2019-01-14 PROCEDURE — 93005 ELECTROCARDIOGRAM TRACING: CPT | Performed by: ANESTHESIOLOGY

## 2019-01-14 PROCEDURE — 96374 THER/PROPH/DIAG INJ IV PUSH: CPT

## 2019-01-14 PROCEDURE — 87081 CULTURE SCREEN ONLY: CPT

## 2019-01-14 PROCEDURE — G0480 DRUG TEST DEF 1-7 CLASSES: HCPCS

## 2019-01-14 PROCEDURE — 6360000002 HC RX W HCPCS: Performed by: FAMILY MEDICINE

## 2019-01-14 PROCEDURE — 2580000003 HC RX 258: Performed by: FAMILY MEDICINE

## 2019-01-14 PROCEDURE — 85025 COMPLETE CBC W/AUTO DIFF WBC: CPT

## 2019-01-14 PROCEDURE — 2060000000 HC ICU INTERMEDIATE R&B

## 2019-01-14 PROCEDURE — 93005 ELECTROCARDIOGRAM TRACING: CPT | Performed by: FAMILY MEDICINE

## 2019-01-14 PROCEDURE — 99223 1ST HOSP IP/OBS HIGH 75: CPT | Performed by: ANESTHESIOLOGY

## 2019-01-14 RX ORDER — SODIUM CHLORIDE 9 MG/ML
INJECTION, SOLUTION INTRAVENOUS CONTINUOUS
Status: DISCONTINUED | OUTPATIENT
Start: 2019-01-14 | End: 2019-01-15

## 2019-01-14 RX ORDER — CYANOCOBALAMIN 1000 UG/ML
1000 INJECTION INTRAMUSCULAR; SUBCUTANEOUS
Status: DISCONTINUED | OUTPATIENT
Start: 2019-01-15 | End: 2019-01-22 | Stop reason: HOSPADM

## 2019-01-14 RX ORDER — POTASSIUM CHLORIDE 7.45 MG/ML
20 INJECTION INTRAVENOUS ONCE
Status: DISCONTINUED | OUTPATIENT
Start: 2019-01-14 | End: 2019-01-14 | Stop reason: CLARIF

## 2019-01-14 RX ORDER — ONDANSETRON 2 MG/ML
4 INJECTION INTRAMUSCULAR; INTRAVENOUS ONCE
Status: COMPLETED | OUTPATIENT
Start: 2019-01-14 | End: 2019-01-14

## 2019-01-14 RX ORDER — POTASSIUM CHLORIDE 750 MG/1
40 TABLET, FILM COATED, EXTENDED RELEASE ORAL ONCE
Status: COMPLETED | OUTPATIENT
Start: 2019-01-14 | End: 2019-01-14

## 2019-01-14 RX ORDER — LEVOTHYROXINE SODIUM 88 UG/1
88 TABLET ORAL DAILY
Status: DISCONTINUED | OUTPATIENT
Start: 2019-01-15 | End: 2019-01-22 | Stop reason: HOSPADM

## 2019-01-14 RX ORDER — LORAZEPAM 2 MG/ML
4 INJECTION INTRAMUSCULAR
Status: DISCONTINUED | OUTPATIENT
Start: 2019-01-14 | End: 2019-01-15 | Stop reason: SDUPTHER

## 2019-01-14 RX ORDER — LORAZEPAM 2 MG/ML
1 INJECTION INTRAMUSCULAR
Status: DISCONTINUED | OUTPATIENT
Start: 2019-01-14 | End: 2019-01-15 | Stop reason: SDUPTHER

## 2019-01-14 RX ORDER — THIAMINE MONONITRATE (VIT B1) 100 MG
100 TABLET ORAL DAILY
Status: DISCONTINUED | OUTPATIENT
Start: 2019-01-18 | End: 2019-01-15

## 2019-01-14 RX ORDER — POTASSIUM CHLORIDE 7.45 MG/ML
INJECTION INTRAVENOUS
Status: COMPLETED
Start: 2019-01-14 | End: 2019-01-14

## 2019-01-14 RX ORDER — LORAZEPAM 2 MG/1
4 TABLET ORAL
Status: DISCONTINUED | OUTPATIENT
Start: 2019-01-14 | End: 2019-01-15 | Stop reason: SDUPTHER

## 2019-01-14 RX ORDER — QUETIAPINE FUMARATE 25 MG/1
50 TABLET, FILM COATED ORAL NIGHTLY PRN
Status: DISCONTINUED | OUTPATIENT
Start: 2019-01-15 | End: 2019-01-15 | Stop reason: SDUPTHER

## 2019-01-14 RX ORDER — ONDANSETRON 2 MG/ML
4 INJECTION INTRAMUSCULAR; INTRAVENOUS EVERY 6 HOURS PRN
Status: DISCONTINUED | OUTPATIENT
Start: 2019-01-14 | End: 2019-01-15 | Stop reason: SDUPTHER

## 2019-01-14 RX ORDER — SODIUM CHLORIDE 0.9 % (FLUSH) 0.9 %
10 SYRINGE (ML) INJECTION PRN
Status: DISCONTINUED | OUTPATIENT
Start: 2019-01-14 | End: 2019-01-14 | Stop reason: SDUPTHER

## 2019-01-14 RX ORDER — SODIUM CHLORIDE 0.9 % (FLUSH) 0.9 %
10 SYRINGE (ML) INJECTION EVERY 12 HOURS SCHEDULED
Status: DISCONTINUED | OUTPATIENT
Start: 2019-01-14 | End: 2019-01-22 | Stop reason: HOSPADM

## 2019-01-14 RX ORDER — LORAZEPAM 0.5 MG/1
0.5 TABLET ORAL DAILY PRN
Status: DISCONTINUED | OUTPATIENT
Start: 2019-01-14 | End: 2019-01-22 | Stop reason: HOSPADM

## 2019-01-14 RX ORDER — LORAZEPAM 1 MG/1
1 TABLET ORAL
Status: DISCONTINUED | OUTPATIENT
Start: 2019-01-14 | End: 2019-01-15 | Stop reason: SDUPTHER

## 2019-01-14 RX ORDER — PANTOPRAZOLE SODIUM 40 MG/1
40 TABLET, DELAYED RELEASE ORAL
Status: DISCONTINUED | OUTPATIENT
Start: 2019-01-15 | End: 2019-01-15 | Stop reason: SDUPTHER

## 2019-01-14 RX ORDER — POTASSIUM CHLORIDE 7.45 MG/ML
10 INJECTION INTRAVENOUS PRN
Status: DISCONTINUED | OUTPATIENT
Start: 2019-01-14 | End: 2019-01-22 | Stop reason: HOSPADM

## 2019-01-14 RX ORDER — SODIUM CHLORIDE 0.9 % (FLUSH) 0.9 %
10 SYRINGE (ML) INJECTION PRN
Status: DISCONTINUED | OUTPATIENT
Start: 2019-01-14 | End: 2019-01-22 | Stop reason: HOSPADM

## 2019-01-14 RX ORDER — LORAZEPAM 2 MG/1
2 TABLET ORAL
Status: DISCONTINUED | OUTPATIENT
Start: 2019-01-14 | End: 2019-01-15 | Stop reason: SDUPTHER

## 2019-01-14 RX ORDER — LORAZEPAM 2 MG/ML
3 INJECTION INTRAMUSCULAR
Status: DISCONTINUED | OUTPATIENT
Start: 2019-01-14 | End: 2019-01-15 | Stop reason: SDUPTHER

## 2019-01-14 RX ORDER — FOLIC ACID 1 MG/1
1 TABLET ORAL DAILY
Status: DISCONTINUED | OUTPATIENT
Start: 2019-01-18 | End: 2019-01-15

## 2019-01-14 RX ORDER — LORAZEPAM 2 MG/ML
2 INJECTION INTRAMUSCULAR
Status: DISCONTINUED | OUTPATIENT
Start: 2019-01-14 | End: 2019-01-15 | Stop reason: SDUPTHER

## 2019-01-14 RX ORDER — NADOLOL 40 MG/1
20 TABLET ORAL DAILY
Status: DISCONTINUED | OUTPATIENT
Start: 2019-01-15 | End: 2019-01-22 | Stop reason: HOSPADM

## 2019-01-14 RX ORDER — POTASSIUM CHLORIDE 7.45 MG/ML
10 INJECTION INTRAVENOUS
Status: COMPLETED | OUTPATIENT
Start: 2019-01-14 | End: 2019-01-14

## 2019-01-14 RX ORDER — SODIUM CHLORIDE 0.9 % (FLUSH) 0.9 %
10 SYRINGE (ML) INJECTION EVERY 12 HOURS SCHEDULED
Status: DISCONTINUED | OUTPATIENT
Start: 2019-01-14 | End: 2019-01-14 | Stop reason: SDUPTHER

## 2019-01-14 RX ORDER — TRAMADOL HYDROCHLORIDE 50 MG/1
50 TABLET ORAL EVERY 6 HOURS PRN
Status: DISCONTINUED | OUTPATIENT
Start: 2019-01-14 | End: 2019-01-22 | Stop reason: HOSPADM

## 2019-01-14 RX ORDER — ONDANSETRON 4 MG/1
4 TABLET, FILM COATED ORAL EVERY 8 HOURS PRN
Status: DISCONTINUED | OUTPATIENT
Start: 2019-01-14 | End: 2019-01-15 | Stop reason: SDUPTHER

## 2019-01-14 RX ORDER — ONDANSETRON 2 MG/ML
4 INJECTION INTRAMUSCULAR; INTRAVENOUS EVERY 6 HOURS PRN
Status: DISCONTINUED | OUTPATIENT
Start: 2019-01-14 | End: 2019-01-14 | Stop reason: SDUPTHER

## 2019-01-14 RX ADMIN — POTASSIUM CHLORIDE 10 MEQ: 7.46 INJECTION, SOLUTION INTRAVENOUS at 20:41

## 2019-01-14 RX ADMIN — SODIUM CHLORIDE: 9 INJECTION, SOLUTION INTRAVENOUS at 20:03

## 2019-01-14 RX ADMIN — POTASSIUM CHLORIDE 40 MEQ: 750 TABLET, EXTENDED RELEASE ORAL at 20:40

## 2019-01-14 RX ADMIN — ONDANSETRON 4 MG: 2 INJECTION INTRAMUSCULAR; INTRAVENOUS at 19:40

## 2019-01-14 RX ADMIN — POTASSIUM CHLORIDE 10 MEQ: 7.46 INJECTION, SOLUTION INTRAVENOUS at 22:12

## 2019-01-14 ASSESSMENT — ENCOUNTER SYMPTOMS
NAUSEA: 1
WHEEZING: 0
CHEST TIGHTNESS: 0
ABDOMINAL PAIN: 1
DIARRHEA: 0
SORE THROAT: 0
SHORTNESS OF BREATH: 0
ABDOMINAL DISTENTION: 1
COLOR CHANGE: 0
VOMITING: 1
BACK PAIN: 0
SINUS PRESSURE: 0
EYE DISCHARGE: 0

## 2019-01-14 ASSESSMENT — PAIN SCALES - GENERAL
PAINLEVEL_OUTOF10: 6
PAINLEVEL_OUTOF10: 6

## 2019-01-14 ASSESSMENT — PAIN DESCRIPTION - LOCATION
LOCATION: ABDOMEN
LOCATION: ABDOMEN;HEAD

## 2019-01-15 ENCOUNTER — APPOINTMENT (OUTPATIENT)
Dept: ULTRASOUND IMAGING | Age: 39
DRG: 433 | End: 2019-01-15
Payer: MEDICARE

## 2019-01-15 LAB
ABO: NORMAL
ALBUMIN SERPL-MCNC: 2.2 G/DL (ref 3.5–5.1)
ALP BLD-CCNC: 215 U/L (ref 38–126)
ALT SERPL-CCNC: 45 U/L (ref 11–66)
AMMONIA: 39 UMOL/L (ref 11–60)
ANION GAP SERPL CALCULATED.3IONS-SCNC: 14 MEQ/L (ref 8–16)
ANION GAP SERPL CALCULATED.3IONS-SCNC: 9 MEQ/L (ref 8–16)
ANION GAP SERPL CALCULATED.3IONS-SCNC: 9 MEQ/L (ref 8–16)
ANTIBODY SCREEN: NORMAL
APTT: 52.4 SECONDS (ref 22–38)
AST SERPL-CCNC: 221 U/L (ref 5–40)
BILIRUB SERPL-MCNC: 9.7 MG/DL (ref 0.3–1.2)
BUN BLDV-MCNC: < 2 MG/DL (ref 7–22)
CALCIUM IONIZED: 0.75 MMOL/L (ref 1.12–1.32)
CALCIUM IONIZED: 0.8 MMOL/L (ref 1.12–1.32)
CALCIUM SERPL-MCNC: 7 MG/DL (ref 8.5–10.5)
CHLORIDE BLD-SCNC: 69 MEQ/L (ref 98–111)
CHLORIDE BLD-SCNC: 71 MEQ/L (ref 98–111)
CHLORIDE BLD-SCNC: 71 MEQ/L (ref 98–111)
CO2: 24 MEQ/L (ref 23–33)
CO2: 26 MEQ/L (ref 23–33)
CO2: 27 MEQ/L (ref 23–33)
CORTISOL COLLECTION INFO: NORMAL
CORTISOL: 23.38 UG/DL
CREAT SERPL-MCNC: 0.2 MG/DL (ref 0.4–1.2)
EKG ATRIAL RATE: 104 BPM
EKG ATRIAL RATE: 104 BPM
EKG P AXIS: 52 DEGREES
EKG P AXIS: 63 DEGREES
EKG P-R INTERVAL: 138 MS
EKG P-R INTERVAL: 144 MS
EKG Q-T INTERVAL: 408 MS
EKG Q-T INTERVAL: 424 MS
EKG QRS DURATION: 74 MS
EKG QRS DURATION: 78 MS
EKG QTC CALCULATION (BAZETT): 536 MS
EKG QTC CALCULATION (BAZETT): 557 MS
EKG R AXIS: 31 DEGREES
EKG R AXIS: 62 DEGREES
EKG T AXIS: 40 DEGREES
EKG T AXIS: 54 DEGREES
EKG VENTRICULAR RATE: 104 BPM
EKG VENTRICULAR RATE: 104 BPM
ERYTHROCYTE [DISTWIDTH] IN BLOOD BY AUTOMATED COUNT: 19.3 % (ref 11.5–14.5)
ERYTHROCYTE [DISTWIDTH] IN BLOOD BY AUTOMATED COUNT: 61.8 FL (ref 35–45)
ETHYL ALCOHOL, SERUM: 0.14 %
GFR SERPL CREATININE-BSD FRML MDRD: > 90 ML/MIN/1.73M2
GLUCOSE BLD-MCNC: 85 MG/DL (ref 70–108)
HCT VFR BLD CALC: 26.2 % (ref 37–47)
HEMOCCULT STL QL: NEGATIVE
HEMOGLOBIN: 9.2 GM/DL (ref 12–16)
INR BLD: 3.36 (ref 0.85–1.13)
LIPASE: 26.6 U/L (ref 5.6–51.3)
MAGNESIUM: 1.7 MG/DL (ref 1.6–2.4)
MAGNESIUM: 1.8 MG/DL (ref 1.6–2.4)
MCH RBC QN AUTO: 31.3 PG (ref 26–33)
MCHC RBC AUTO-ENTMCNC: 35.1 GM/DL (ref 32.2–35.5)
MCV RBC AUTO: 89.1 FL (ref 81–99)
MRSA SCREEN RT-PCR: POSITIVE
OSMOLALITY URINE: 523 MOSMOL/KG (ref 250–750)
OSMOLALITY: 239 MOSMOL/KG (ref 275–295)
PHOSPHORUS: 1.3 MG/DL (ref 2.4–4.7)
PHOSPHORUS: 1.6 MG/DL (ref 2.4–4.7)
PHOSPHORUS: 1.8 MG/DL (ref 2.4–4.7)
PLATELET # BLD: 113 THOU/MM3 (ref 130–400)
PMV BLD AUTO: 10.3 FL (ref 9.4–12.4)
POTASSIUM REFLEX MAGNESIUM: 3.3 MEQ/L (ref 3.5–5.2)
POTASSIUM REFLEX MAGNESIUM: 3.6 MEQ/L (ref 3.5–5.2)
POTASSIUM REFLEX MAGNESIUM: 4 MEQ/L (ref 3.5–5.2)
POTASSIUM SERPL-SCNC: 3.5 MEQ/L (ref 3.5–5.2)
POTASSIUM SERPL-SCNC: 4.2 MEQ/L (ref 3.5–5.2)
RBC # BLD: 2.94 MILL/MM3 (ref 4.2–5.4)
REASON FOR REJECTION: NORMAL
REJECTED TEST: NORMAL
RH FACTOR: NORMAL
SODIUM BLD-SCNC: 103 MEQ/L (ref 135–145)
SODIUM BLD-SCNC: 105 MEQ/L (ref 135–145)
SODIUM BLD-SCNC: 106 MEQ/L (ref 135–145)
SODIUM BLD-SCNC: 106 MEQ/L (ref 135–145)
SODIUM BLD-SCNC: 107 MEQ/L (ref 135–145)
SODIUM BLD-SCNC: 107 MEQ/L (ref 135–145)
SODIUM BLD-SCNC: 109 MEQ/L (ref 135–145)
SODIUM URINE: < 20 MEQ/L
TOTAL PROTEIN: 5.4 G/DL (ref 6.1–8)
TSH SERPL DL<=0.05 MIU/L-ACNC: 3.66 UIU/ML (ref 0.4–4.2)
VANCOMYCIN RESISTANT ENTEROCOCCUS: NEGATIVE
WBC # BLD: 10.2 THOU/MM3 (ref 4.8–10.8)

## 2019-01-15 PROCEDURE — 83935 ASSAY OF URINE OSMOLALITY: CPT

## 2019-01-15 PROCEDURE — 86850 RBC ANTIBODY SCREEN: CPT

## 2019-01-15 PROCEDURE — 84100 ASSAY OF PHOSPHORUS: CPT

## 2019-01-15 PROCEDURE — 36430 TRANSFUSION BLD/BLD COMPNT: CPT

## 2019-01-15 PROCEDURE — 6370000000 HC RX 637 (ALT 250 FOR IP): Performed by: INTERNAL MEDICINE

## 2019-01-15 PROCEDURE — 2580000003 HC RX 258: Performed by: ANESTHESIOLOGY

## 2019-01-15 PROCEDURE — 86900 BLOOD TYPING SEROLOGIC ABO: CPT

## 2019-01-15 PROCEDURE — 82272 OCCULT BLD FECES 1-3 TESTS: CPT

## 2019-01-15 PROCEDURE — 6360000002 HC RX W HCPCS: Performed by: INTERNAL MEDICINE

## 2019-01-15 PROCEDURE — 99233 SBSQ HOSP IP/OBS HIGH 50: CPT | Performed by: INTERNAL MEDICINE

## 2019-01-15 PROCEDURE — 2580000003 HC RX 258: Performed by: NURSE PRACTITIONER

## 2019-01-15 PROCEDURE — 83735 ASSAY OF MAGNESIUM: CPT

## 2019-01-15 PROCEDURE — 2580000003 HC RX 258: Performed by: INTERNAL MEDICINE

## 2019-01-15 PROCEDURE — 2500000003 HC RX 250 WO HCPCS: Performed by: ANESTHESIOLOGY

## 2019-01-15 PROCEDURE — 85027 COMPLETE CBC AUTOMATED: CPT

## 2019-01-15 PROCEDURE — 94760 N-INVAS EAR/PLS OXIMETRY 1: CPT

## 2019-01-15 PROCEDURE — 97535 SELF CARE MNGMENT TRAINING: CPT

## 2019-01-15 PROCEDURE — 93010 ELECTROCARDIOGRAM REPORT: CPT | Performed by: INTERNAL MEDICINE

## 2019-01-15 PROCEDURE — 87205 SMEAR GRAM STAIN: CPT

## 2019-01-15 PROCEDURE — 85610 PROTHROMBIN TIME: CPT

## 2019-01-15 PROCEDURE — 99222 1ST HOSP IP/OBS MODERATE 55: CPT | Performed by: NURSE PRACTITIONER

## 2019-01-15 PROCEDURE — 80053 COMPREHEN METABOLIC PANEL: CPT

## 2019-01-15 PROCEDURE — 93975 VASCULAR STUDY: CPT

## 2019-01-15 PROCEDURE — 2500000003 HC RX 250 WO HCPCS: Performed by: INTERNAL MEDICINE

## 2019-01-15 PROCEDURE — 86901 BLOOD TYPING SEROLOGIC RH(D): CPT

## 2019-01-15 PROCEDURE — 99223 1ST HOSP IP/OBS HIGH 75: CPT | Performed by: ANESTHESIOLOGY

## 2019-01-15 PROCEDURE — 84132 ASSAY OF SERUM POTASSIUM: CPT

## 2019-01-15 PROCEDURE — 84295 ASSAY OF SERUM SODIUM: CPT

## 2019-01-15 PROCEDURE — 2060000000 HC ICU INTERMEDIATE R&B

## 2019-01-15 PROCEDURE — 82140 ASSAY OF AMMONIA: CPT

## 2019-01-15 PROCEDURE — 97166 OT EVAL MOD COMPLEX 45 MIN: CPT

## 2019-01-15 PROCEDURE — 84300 ASSAY OF URINE SODIUM: CPT

## 2019-01-15 PROCEDURE — 76705 ECHO EXAM OF ABDOMEN: CPT

## 2019-01-15 PROCEDURE — P9017 PLASMA 1 DONOR FRZ W/IN 8 HR: HCPCS

## 2019-01-15 PROCEDURE — 82330 ASSAY OF CALCIUM: CPT

## 2019-01-15 PROCEDURE — 36415 COLL VENOUS BLD VENIPUNCTURE: CPT

## 2019-01-15 PROCEDURE — 6370000000 HC RX 637 (ALT 250 FOR IP): Performed by: ANESTHESIOLOGY

## 2019-01-15 PROCEDURE — 80051 ELECTROLYTE PANEL: CPT

## 2019-01-15 PROCEDURE — 6370000000 HC RX 637 (ALT 250 FOR IP): Performed by: NURSE PRACTITIONER

## 2019-01-15 PROCEDURE — 83690 ASSAY OF LIPASE: CPT

## 2019-01-15 PROCEDURE — 6360000002 HC RX W HCPCS: Performed by: ANESTHESIOLOGY

## 2019-01-15 PROCEDURE — 6360000002 HC RX W HCPCS: Performed by: NURSE PRACTITIONER

## 2019-01-15 PROCEDURE — 83930 ASSAY OF BLOOD OSMOLALITY: CPT

## 2019-01-15 PROCEDURE — 85730 THROMBOPLASTIN TIME PARTIAL: CPT

## 2019-01-15 PROCEDURE — 82533 TOTAL CORTISOL: CPT

## 2019-01-15 PROCEDURE — 84443 ASSAY THYROID STIM HORMONE: CPT

## 2019-01-15 PROCEDURE — 2709999900 HC NON-CHARGEABLE SUPPLY

## 2019-01-15 RX ORDER — 0.9 % SODIUM CHLORIDE 0.9 %
250 INTRAVENOUS SOLUTION INTRAVENOUS ONCE
Status: DISCONTINUED | OUTPATIENT
Start: 2019-01-15 | End: 2019-01-22 | Stop reason: HOSPADM

## 2019-01-15 RX ORDER — LORAZEPAM 2 MG/ML
1 INJECTION INTRAMUSCULAR
Status: DISCONTINUED | OUTPATIENT
Start: 2019-01-15 | End: 2019-01-22 | Stop reason: HOSPADM

## 2019-01-15 RX ORDER — DEXTROSE MONOHYDRATE 50 MG/ML
100 INJECTION, SOLUTION INTRAVENOUS PRN
Status: DISCONTINUED | OUTPATIENT
Start: 2019-01-15 | End: 2019-01-22 | Stop reason: HOSPADM

## 2019-01-15 RX ORDER — FOLIC ACID 1 MG/1
1 TABLET ORAL DAILY
Status: DISCONTINUED | OUTPATIENT
Start: 2019-01-19 | End: 2019-01-22 | Stop reason: HOSPADM

## 2019-01-15 RX ORDER — PANTOPRAZOLE SODIUM 40 MG/1
40 TABLET, DELAYED RELEASE ORAL
Status: DISCONTINUED | OUTPATIENT
Start: 2019-01-15 | End: 2019-01-22 | Stop reason: HOSPADM

## 2019-01-15 RX ORDER — LORAZEPAM 2 MG/ML
2 INJECTION INTRAMUSCULAR
Status: DISCONTINUED | OUTPATIENT
Start: 2019-01-15 | End: 2019-01-22 | Stop reason: HOSPADM

## 2019-01-15 RX ORDER — 3% SODIUM CHLORIDE 3 G/100ML
40 INJECTION, SOLUTION INTRAVENOUS CONTINUOUS
Status: DISCONTINUED | OUTPATIENT
Start: 2019-01-15 | End: 2019-01-15

## 2019-01-15 RX ORDER — LORAZEPAM 2 MG/1
2 TABLET ORAL
Status: DISCONTINUED | OUTPATIENT
Start: 2019-01-15 | End: 2019-01-22 | Stop reason: HOSPADM

## 2019-01-15 RX ORDER — NICOTINE POLACRILEX 4 MG
15 LOZENGE BUCCAL PRN
Status: DISCONTINUED | OUTPATIENT
Start: 2019-01-15 | End: 2019-01-22 | Stop reason: HOSPADM

## 2019-01-15 RX ORDER — MULTIVITAMIN WITH FOLIC ACID 400 MCG
1 TABLET ORAL DAILY
Status: DISCONTINUED | OUTPATIENT
Start: 2019-01-19 | End: 2019-01-22 | Stop reason: HOSPADM

## 2019-01-15 RX ORDER — THIAMINE MONONITRATE (VIT B1) 100 MG
100 TABLET ORAL DAILY
Status: DISCONTINUED | OUTPATIENT
Start: 2019-01-19 | End: 2019-01-22 | Stop reason: HOSPADM

## 2019-01-15 RX ORDER — DEXTROSE MONOHYDRATE 25 G/50ML
12.5 INJECTION, SOLUTION INTRAVENOUS PRN
Status: DISCONTINUED | OUTPATIENT
Start: 2019-01-15 | End: 2019-01-22 | Stop reason: HOSPADM

## 2019-01-15 RX ORDER — SODIUM CHLORIDE 1000 MG
2 TABLET, SOLUBLE MISCELLANEOUS ONCE
Status: COMPLETED | OUTPATIENT
Start: 2019-01-15 | End: 2019-01-15

## 2019-01-15 RX ORDER — LORAZEPAM 1 MG/1
1 TABLET ORAL
Status: DISCONTINUED | OUTPATIENT
Start: 2019-01-15 | End: 2019-01-22 | Stop reason: HOSPADM

## 2019-01-15 RX ORDER — CALCIUM GLUCONATE 94 MG/ML
1 INJECTION, SOLUTION INTRAVENOUS ONCE
Status: DISCONTINUED | OUTPATIENT
Start: 2019-01-15 | End: 2019-01-15 | Stop reason: SDUPTHER

## 2019-01-15 RX ORDER — LORAZEPAM 2 MG/ML
3 INJECTION INTRAMUSCULAR
Status: DISCONTINUED | OUTPATIENT
Start: 2019-01-15 | End: 2019-01-22 | Stop reason: HOSPADM

## 2019-01-15 RX ORDER — SODIUM CHLORIDE 1000 MG
2 TABLET, SOLUBLE MISCELLANEOUS 2 TIMES DAILY WITH MEALS
Status: DISCONTINUED | OUTPATIENT
Start: 2019-01-15 | End: 2019-01-18

## 2019-01-15 RX ORDER — LORAZEPAM 2 MG/1
4 TABLET ORAL
Status: DISCONTINUED | OUTPATIENT
Start: 2019-01-15 | End: 2019-01-22 | Stop reason: HOSPADM

## 2019-01-15 RX ORDER — LORAZEPAM 2 MG/ML
4 INJECTION INTRAMUSCULAR
Status: DISCONTINUED | OUTPATIENT
Start: 2019-01-15 | End: 2019-01-22 | Stop reason: HOSPADM

## 2019-01-15 RX ADMIN — RIFAXIMIN 550 MG: 550 TABLET ORAL at 01:20

## 2019-01-15 RX ADMIN — TRIMETHOBENZAMIDE HYDROCHLORIDE 200 MG: 100 INJECTION INTRAMUSCULAR at 09:22

## 2019-01-15 RX ADMIN — Medication 10 ML: at 21:05

## 2019-01-15 RX ADMIN — SODIUM CHLORIDE 40 ML/HR: 3 INJECTION, SOLUTION INTRAVENOUS at 17:17

## 2019-01-15 RX ADMIN — TRAMADOL HYDROCHLORIDE 50 MG: 50 TABLET, FILM COATED ORAL at 09:31

## 2019-01-15 RX ADMIN — LORAZEPAM 0.5 MG: 0.5 TABLET ORAL at 03:00

## 2019-01-15 RX ADMIN — CYANOCOBALAMIN 1000 MCG: 1000 INJECTION, SOLUTION INTRAMUSCULAR at 09:24

## 2019-01-15 RX ADMIN — PANTOPRAZOLE SODIUM 40 MG: 40 TABLET, DELAYED RELEASE ORAL at 16:58

## 2019-01-15 RX ADMIN — RIFAXIMIN 550 MG: 550 TABLET ORAL at 21:44

## 2019-01-15 RX ADMIN — POTASSIUM & SODIUM PHOSPHATES POWDER PACK 280-160-250 MG 250 MG: 280-160-250 PACK at 21:44

## 2019-01-15 RX ADMIN — POTASSIUM CHLORIDE 10 MEQ: 7.46 INJECTION, SOLUTION INTRAVENOUS at 05:58

## 2019-01-15 RX ADMIN — SODIUM CHLORIDE TAB 1 GM 2 G: 1 TAB at 16:59

## 2019-01-15 RX ADMIN — TRIMETHOBENZAMIDE HYDROCHLORIDE 200 MG: 100 INJECTION INTRAMUSCULAR at 01:19

## 2019-01-15 RX ADMIN — CEFTRIAXONE SODIUM 2 G: 2 INJECTION, POWDER, FOR SOLUTION INTRAMUSCULAR; INTRAVENOUS at 20:57

## 2019-01-15 RX ADMIN — POTASSIUM & SODIUM PHOSPHATES POWDER PACK 280-160-250 MG 250 MG: 280-160-250 PACK at 15:42

## 2019-01-15 RX ADMIN — POTASSIUM & SODIUM PHOSPHATES POWDER PACK 280-160-250 MG 250 MG: 280-160-250 PACK at 11:46

## 2019-01-15 RX ADMIN — FOLIC ACID: 5 INJECTION, SOLUTION INTRAMUSCULAR; INTRAVENOUS; SUBCUTANEOUS at 02:48

## 2019-01-15 RX ADMIN — SODIUM CHLORIDE TAB 1 GM 2 G: 1 TAB at 13:18

## 2019-01-15 RX ADMIN — CALCIUM GLUCONATE 1 G: 98 INJECTION, SOLUTION INTRAVENOUS at 11:48

## 2019-01-15 RX ADMIN — SODIUM CHLORIDE TAB 1 GM 2 G: 1 TAB at 21:44

## 2019-01-15 RX ADMIN — POTASSIUM CHLORIDE 10 MEQ: 7.46 INJECTION, SOLUTION INTRAVENOUS at 03:46

## 2019-01-15 RX ADMIN — LEVOTHYROXINE SODIUM 88 MCG: 88 TABLET ORAL at 06:54

## 2019-01-15 RX ADMIN — POTASSIUM PHOSPHATE, MONOBASIC AND POTASSIUM PHOSPHATE, DIBASIC 30 MMOL: 224; 236 INJECTION, SOLUTION, CONCENTRATE INTRAVENOUS at 21:44

## 2019-01-15 RX ADMIN — POTASSIUM CHLORIDE 10 MEQ: 7.46 INJECTION, SOLUTION INTRAVENOUS at 04:43

## 2019-01-15 RX ADMIN — RIFAXIMIN 550 MG: 550 TABLET ORAL at 09:31

## 2019-01-15 RX ADMIN — Medication 10 ML: at 09:28

## 2019-01-15 RX ADMIN — LORAZEPAM 2 MG: 2 INJECTION, SOLUTION INTRAMUSCULAR; INTRAVENOUS at 09:27

## 2019-01-15 RX ADMIN — POTASSIUM CHLORIDE 10 MEQ: 7.46 INJECTION, SOLUTION INTRAVENOUS at 02:50

## 2019-01-15 RX ADMIN — NADOLOL 20 MG: 40 TABLET ORAL at 13:18

## 2019-01-15 RX ADMIN — PHYTONADIONE 10 MG: 10 INJECTION, EMULSION INTRAMUSCULAR; INTRAVENOUS; SUBCUTANEOUS at 11:43

## 2019-01-15 ASSESSMENT — PAIN SCALES - GENERAL
PAINLEVEL_OUTOF10: 9
PAINLEVEL_OUTOF10: 6
PAINLEVEL_OUTOF10: 0

## 2019-01-15 ASSESSMENT — PAIN DESCRIPTION - PAIN TYPE
TYPE: ACUTE PAIN

## 2019-01-15 ASSESSMENT — PAIN DESCRIPTION - LOCATION
LOCATION: ABDOMEN
LOCATION: ABDOMEN;BUTTOCKS
LOCATION: ABDOMEN

## 2019-01-15 ASSESSMENT — PAIN - FUNCTIONAL ASSESSMENT: PAIN_FUNCTIONAL_ASSESSMENT: PREVENTS OR INTERFERES SOME ACTIVE ACTIVITIES AND ADLS

## 2019-01-15 ASSESSMENT — PAIN DESCRIPTION - DESCRIPTORS: DESCRIPTORS: SORE

## 2019-01-15 ASSESSMENT — PAIN DESCRIPTION - ORIENTATION: ORIENTATION: RIGHT

## 2019-01-16 ENCOUNTER — APPOINTMENT (OUTPATIENT)
Dept: ULTRASOUND IMAGING | Age: 39
DRG: 433 | End: 2019-01-16
Payer: MEDICARE

## 2019-01-16 LAB
ALBUMIN FLUID: 0.3 GM/DL
ALBUMIN SERPL-MCNC: 2.3 G/DL (ref 3.5–5.1)
ALP BLD-CCNC: 222 U/L (ref 38–126)
ALT SERPL-CCNC: 46 U/L (ref 11–66)
ANION GAP SERPL CALCULATED.3IONS-SCNC: 10 MEQ/L (ref 8–16)
ANION GAP SERPL CALCULATED.3IONS-SCNC: 10 MEQ/L (ref 8–16)
ANION GAP SERPL CALCULATED.3IONS-SCNC: 11 MEQ/L (ref 8–16)
ANION GAP SERPL CALCULATED.3IONS-SCNC: 12 MEQ/L (ref 8–16)
AST SERPL-CCNC: 226 U/L (ref 5–40)
BILIRUB SERPL-MCNC: 10.4 MG/DL (ref 0.3–1.2)
BILIRUBIN DIRECT: 7.2 MG/DL (ref 0–0.3)
BODY FLUID RBC: < 2000 /CUMM
BUN BLDV-MCNC: < 2 MG/DL (ref 7–22)
CALCIUM IONIZED: 0.92 MMOL/L (ref 1.12–1.32)
CALCIUM SERPL-MCNC: 7.8 MG/DL (ref 8.5–10.5)
CHARACTER, BODY FLUID: CLEAR
CHLORIDE BLD-SCNC: 72 MEQ/L (ref 98–111)
CHLORIDE BLD-SCNC: 75 MEQ/L (ref 98–111)
CHLORIDE BLD-SCNC: 76 MEQ/L (ref 98–111)
CHLORIDE BLD-SCNC: 76 MEQ/L (ref 98–111)
CHLORIDE BLD-SCNC: 78 MEQ/L (ref 98–111)
CHLORIDE BLD-SCNC: 81 MEQ/L (ref 98–111)
CO2: 27 MEQ/L (ref 23–33)
CO2: 28 MEQ/L (ref 23–33)
CO2: 29 MEQ/L (ref 23–33)
CO2: 29 MEQ/L (ref 23–33)
COLOR: YELLOW
CREAT SERPL-MCNC: < 0.2 MG/DL (ref 0.4–1.2)
ERYTHROCYTE [DISTWIDTH] IN BLOOD BY AUTOMATED COUNT: 18.6 % (ref 11.5–14.5)
ERYTHROCYTE [DISTWIDTH] IN BLOOD BY AUTOMATED COUNT: 18.8 % (ref 11.5–14.5)
ERYTHROCYTE [DISTWIDTH] IN BLOOD BY AUTOMATED COUNT: 61.7 FL (ref 35–45)
ERYTHROCYTE [DISTWIDTH] IN BLOOD BY AUTOMATED COUNT: 63.5 FL (ref 35–45)
GFR SERPL CREATININE-BSD FRML MDRD: > 90 ML/MIN/1.73M2
GLUCOSE BLD-MCNC: 107 MG/DL (ref 70–108)
HCT VFR BLD CALC: 27.9 % (ref 37–47)
HCT VFR BLD CALC: 30.5 % (ref 37–47)
HEMOGLOBIN: 10.3 GM/DL (ref 12–16)
HEMOGLOBIN: 9.4 GM/DL (ref 12–16)
INR BLD: 2.6 (ref 0.85–1.13)
MAGNESIUM: 1.8 MG/DL (ref 1.6–2.4)
MAGNESIUM: 1.9 MG/DL (ref 1.6–2.4)
MAGNESIUM: 1.9 MG/DL (ref 1.6–2.4)
MAGNESIUM: 2 MG/DL (ref 1.6–2.4)
MAGNESIUM: 2 MG/DL (ref 1.6–2.4)
MCH RBC QN AUTO: 31 PG (ref 26–33)
MCH RBC QN AUTO: 31.7 PG (ref 26–33)
MCHC RBC AUTO-ENTMCNC: 33.7 GM/DL (ref 32.2–35.5)
MCHC RBC AUTO-ENTMCNC: 33.8 GM/DL (ref 32.2–35.5)
MCV RBC AUTO: 92.1 FL (ref 81–99)
MCV RBC AUTO: 93.8 FL (ref 81–99)
MESOTHELIAL CELLS BODY FLUID: NORMAL
MONONUCLEAR CELLS BODY FLUID: 74.5 %
MRSA SCREEN: NORMAL
PATHOLOGIST REVIEW: NORMAL
PHOSPHORUS: 3.2 MG/DL (ref 2.4–4.7)
PLATELET # BLD: 103 THOU/MM3 (ref 130–400)
PLATELET # BLD: 109 THOU/MM3 (ref 130–400)
PMV BLD AUTO: 10.4 FL (ref 9.4–12.4)
PMV BLD AUTO: 10.9 FL (ref 9.4–12.4)
POLYMORPHONUCLEAR CELLS BODY FLUID: 25.5 %
POTASSIUM REFLEX MAGNESIUM: 3.2 MEQ/L (ref 3.5–5.2)
POTASSIUM REFLEX MAGNESIUM: 3.3 MEQ/L (ref 3.5–5.2)
POTASSIUM REFLEX MAGNESIUM: 3.4 MEQ/L (ref 3.5–5.2)
POTASSIUM REFLEX MAGNESIUM: 3.4 MEQ/L (ref 3.5–5.2)
POTASSIUM REFLEX MAGNESIUM: 3.8 MEQ/L (ref 3.5–5.2)
POTASSIUM SERPL-SCNC: 3.2 MEQ/L (ref 3.5–5.2)
PROTEIN FLUID: 0.7 GM/DL
RBC # BLD: 3.03 MILL/MM3 (ref 4.2–5.4)
RBC # BLD: 3.25 MILL/MM3 (ref 4.2–5.4)
SODIUM BLD-SCNC: 111 MEQ/L (ref 135–145)
SODIUM BLD-SCNC: 114 MEQ/L (ref 135–145)
SODIUM BLD-SCNC: 115 MEQ/L (ref 135–145)
SODIUM BLD-SCNC: 115 MEQ/L (ref 135–145)
SODIUM BLD-SCNC: 117 MEQ/L (ref 135–145)
SODIUM BLD-SCNC: 120 MEQ/L (ref 135–145)
SPECIMEN: NORMAL
TOTAL NUCLEATED CELLS BODY FLUID: 49 /CUMM (ref 0–500)
TOTAL PROTEIN: 5.9 G/DL (ref 6.1–8)
TOTAL VOLUME RECEIVED BODY FLUID: 80 ML
WBC # BLD: 7.6 THOU/MM3 (ref 4.8–10.8)
WBC # BLD: 8.5 THOU/MM3 (ref 4.8–10.8)

## 2019-01-16 PROCEDURE — 83735 ASSAY OF MAGNESIUM: CPT

## 2019-01-16 PROCEDURE — 0W9G3ZX DRAINAGE OF PERITONEAL CAVITY, PERCUTANEOUS APPROACH, DIAGNOSTIC: ICD-10-PCS | Performed by: RADIOLOGY

## 2019-01-16 PROCEDURE — 87070 CULTURE OTHR SPECIMN AEROBIC: CPT

## 2019-01-16 PROCEDURE — 49083 ABD PARACENTESIS W/IMAGING: CPT

## 2019-01-16 PROCEDURE — 99233 SBSQ HOSP IP/OBS HIGH 50: CPT | Performed by: HOSPITALIST

## 2019-01-16 PROCEDURE — 82042 OTHER SOURCE ALBUMIN QUAN EA: CPT

## 2019-01-16 PROCEDURE — 80051 ELECTROLYTE PANEL: CPT

## 2019-01-16 PROCEDURE — 36415 COLL VENOUS BLD VENIPUNCTURE: CPT

## 2019-01-16 PROCEDURE — 2709999900 HC NON-CHARGEABLE SUPPLY

## 2019-01-16 PROCEDURE — 82330 ASSAY OF CALCIUM: CPT

## 2019-01-16 PROCEDURE — 99232 SBSQ HOSP IP/OBS MODERATE 35: CPT | Performed by: INTERNAL MEDICINE

## 2019-01-16 PROCEDURE — 6360000002 HC RX W HCPCS: Performed by: ANESTHESIOLOGY

## 2019-01-16 PROCEDURE — 87075 CULTR BACTERIA EXCEPT BLOOD: CPT

## 2019-01-16 PROCEDURE — 2060000000 HC ICU INTERMEDIATE R&B

## 2019-01-16 PROCEDURE — 84100 ASSAY OF PHOSPHORUS: CPT

## 2019-01-16 PROCEDURE — 6370000000 HC RX 637 (ALT 250 FOR IP): Performed by: INTERNAL MEDICINE

## 2019-01-16 PROCEDURE — 82248 BILIRUBIN DIRECT: CPT

## 2019-01-16 PROCEDURE — 6360000002 HC RX W HCPCS: Performed by: INTERNAL MEDICINE

## 2019-01-16 PROCEDURE — 6370000000 HC RX 637 (ALT 250 FOR IP): Performed by: NURSE PRACTITIONER

## 2019-01-16 PROCEDURE — 85610 PROTHROMBIN TIME: CPT

## 2019-01-16 PROCEDURE — 84157 ASSAY OF PROTEIN OTHER: CPT

## 2019-01-16 PROCEDURE — 85027 COMPLETE CBC AUTOMATED: CPT

## 2019-01-16 PROCEDURE — 89050 BODY FLUID CELL COUNT: CPT

## 2019-01-16 PROCEDURE — 6370000000 HC RX 637 (ALT 250 FOR IP): Performed by: HOSPITALIST

## 2019-01-16 PROCEDURE — 6370000000 HC RX 637 (ALT 250 FOR IP): Performed by: ANESTHESIOLOGY

## 2019-01-16 PROCEDURE — 80053 COMPREHEN METABOLIC PANEL: CPT

## 2019-01-16 PROCEDURE — 2580000003 HC RX 258: Performed by: INTERNAL MEDICINE

## 2019-01-16 PROCEDURE — 2580000003 HC RX 258: Performed by: ANESTHESIOLOGY

## 2019-01-16 RX ORDER — POTASSIUM CHLORIDE 20 MEQ/1
40 TABLET, EXTENDED RELEASE ORAL ONCE
Status: COMPLETED | OUTPATIENT
Start: 2019-01-16 | End: 2019-01-16

## 2019-01-16 RX ORDER — PREDNISONE 20 MG/1
40 TABLET ORAL DAILY
Status: DISCONTINUED | OUTPATIENT
Start: 2019-01-16 | End: 2019-01-17

## 2019-01-16 RX ADMIN — TRAMADOL HYDROCHLORIDE 50 MG: 50 TABLET, FILM COATED ORAL at 23:10

## 2019-01-16 RX ADMIN — CALCIUM GLUCONATE 1 G: 98 INJECTION, SOLUTION INTRAVENOUS at 02:56

## 2019-01-16 RX ADMIN — POTASSIUM CHLORIDE 40 MEQ: 20 TABLET, EXTENDED RELEASE ORAL at 23:10

## 2019-01-16 RX ADMIN — RIFAXIMIN 550 MG: 550 TABLET ORAL at 20:06

## 2019-01-16 RX ADMIN — POTASSIUM CHLORIDE 40 MEQ: 20 TABLET, EXTENDED RELEASE ORAL at 05:40

## 2019-01-16 RX ADMIN — TRIMETHOBENZAMIDE HYDROCHLORIDE 200 MG: 100 INJECTION INTRAMUSCULAR at 00:15

## 2019-01-16 RX ADMIN — Medication 10 ML: at 09:36

## 2019-01-16 RX ADMIN — SODIUM CHLORIDE TAB 1 GM 2 G: 1 TAB at 16:39

## 2019-01-16 RX ADMIN — PREDNISONE 40 MG: 20 TABLET ORAL at 20:06

## 2019-01-16 RX ADMIN — SODIUM CHLORIDE TAB 1 GM 2 G: 1 TAB at 09:34

## 2019-01-16 RX ADMIN — Medication 10 ML: at 20:06

## 2019-01-16 RX ADMIN — TRAMADOL HYDROCHLORIDE 50 MG: 50 TABLET, FILM COATED ORAL at 08:00

## 2019-01-16 RX ADMIN — NADOLOL 20 MG: 40 TABLET ORAL at 09:36

## 2019-01-16 RX ADMIN — RIFAXIMIN 550 MG: 550 TABLET ORAL at 09:34

## 2019-01-16 RX ADMIN — PANTOPRAZOLE SODIUM 40 MG: 40 TABLET, DELAYED RELEASE ORAL at 05:40

## 2019-01-16 RX ADMIN — TRAMADOL HYDROCHLORIDE 50 MG: 50 TABLET, FILM COATED ORAL at 16:41

## 2019-01-16 RX ADMIN — PANTOPRAZOLE SODIUM 40 MG: 40 TABLET, DELAYED RELEASE ORAL at 16:40

## 2019-01-16 RX ADMIN — TRAMADOL HYDROCHLORIDE 50 MG: 50 TABLET, FILM COATED ORAL at 00:24

## 2019-01-16 RX ADMIN — LORAZEPAM 1 MG: 2 INJECTION INTRAMUSCULAR; INTRAVENOUS at 23:11

## 2019-01-16 RX ADMIN — LEVOTHYROXINE SODIUM 88 MCG: 88 TABLET ORAL at 05:40

## 2019-01-16 RX ADMIN — CALCIUM GLUCONATE 1 G: 98 INJECTION, SOLUTION INTRAVENOUS at 22:22

## 2019-01-16 RX ADMIN — TRIMETHOBENZAMIDE HYDROCHLORIDE 200 MG: 100 INJECTION INTRAMUSCULAR at 16:51

## 2019-01-16 RX ADMIN — VITAMIN D, TAB 1000IU (100/BT) 5000 UNITS: 25 TAB at 09:34

## 2019-01-16 ASSESSMENT — PAIN DESCRIPTION - PROGRESSION
CLINICAL_PROGRESSION: NOT CHANGED
CLINICAL_PROGRESSION: GRADUALLY IMPROVING
CLINICAL_PROGRESSION: NOT CHANGED
CLINICAL_PROGRESSION: NOT CHANGED

## 2019-01-16 ASSESSMENT — PAIN DESCRIPTION - PAIN TYPE
TYPE: ACUTE PAIN

## 2019-01-16 ASSESSMENT — PAIN SCALES - GENERAL
PAINLEVEL_OUTOF10: 0
PAINLEVEL_OUTOF10: 9
PAINLEVEL_OUTOF10: 7
PAINLEVEL_OUTOF10: 7
PAINLEVEL_OUTOF10: 8
PAINLEVEL_OUTOF10: 9
PAINLEVEL_OUTOF10: 8

## 2019-01-16 ASSESSMENT — PAIN DESCRIPTION - DESCRIPTORS
DESCRIPTORS: SORE
DESCRIPTORS: ACHING

## 2019-01-16 ASSESSMENT — PAIN DESCRIPTION - LOCATION
LOCATION: BACK
LOCATION: ABDOMEN
LOCATION: ABDOMEN

## 2019-01-16 ASSESSMENT — PAIN DESCRIPTION - ORIENTATION
ORIENTATION: RIGHT
ORIENTATION: LOWER

## 2019-01-16 ASSESSMENT — PAIN DESCRIPTION - ONSET
ONSET: ON-GOING
ONSET: ON-GOING

## 2019-01-16 ASSESSMENT — PAIN DESCRIPTION - FREQUENCY
FREQUENCY: CONTINUOUS
FREQUENCY: CONTINUOUS

## 2019-01-17 LAB
ALBUMIN SERPL-MCNC: 2.3 G/DL (ref 3.5–5.1)
ALP BLD-CCNC: 224 U/L (ref 38–126)
ALT SERPL-CCNC: 42 U/L (ref 11–66)
ANION GAP SERPL CALCULATED.3IONS-SCNC: 10 MEQ/L (ref 8–16)
ANION GAP SERPL CALCULATED.3IONS-SCNC: 7 MEQ/L (ref 8–16)
ANION GAP SERPL CALCULATED.3IONS-SCNC: 8 MEQ/L (ref 8–16)
ANION GAP SERPL CALCULATED.3IONS-SCNC: 8 MEQ/L (ref 8–16)
ANION GAP SERPL CALCULATED.3IONS-SCNC: 9 MEQ/L (ref 8–16)
AST SERPL-CCNC: 183 U/L (ref 5–40)
BILIRUB SERPL-MCNC: 12.7 MG/DL (ref 0.3–1.2)
BILIRUBIN DIRECT: 9 MG/DL (ref 0–0.3)
BUN BLDV-MCNC: < 2 MG/DL (ref 7–22)
CALCIUM IONIZED: 0.98 MMOL/L (ref 1.12–1.32)
CALCIUM SERPL-MCNC: 8.2 MG/DL (ref 8.5–10.5)
CHLORIDE BLD-SCNC: 82 MEQ/L (ref 98–111)
CHLORIDE BLD-SCNC: 84 MEQ/L (ref 98–111)
CHLORIDE BLD-SCNC: 85 MEQ/L (ref 98–111)
CHLORIDE BLD-SCNC: 85 MEQ/L (ref 98–111)
CHLORIDE BLD-SCNC: 86 MEQ/L (ref 98–111)
CO2: 28 MEQ/L (ref 23–33)
CO2: 29 MEQ/L (ref 23–33)
CO2: 30 MEQ/L (ref 23–33)
CREAT SERPL-MCNC: < 0.2 MG/DL (ref 0.4–1.2)
GFR SERPL CREATININE-BSD FRML MDRD: > 90 ML/MIN/1.73M2
GLUCOSE BLD-MCNC: 117 MG/DL (ref 70–108)
INR BLD: 3.41 (ref 0.85–1.13)
MAGNESIUM: 2.2 MG/DL (ref 1.6–2.4)
PHOSPHORUS: 3.3 MG/DL (ref 2.4–4.7)
POTASSIUM REFLEX MAGNESIUM: 3.7 MEQ/L (ref 3.5–5.2)
POTASSIUM REFLEX MAGNESIUM: 4.1 MEQ/L (ref 3.5–5.2)
POTASSIUM REFLEX MAGNESIUM: 4.4 MEQ/L (ref 3.5–5.2)
POTASSIUM REFLEX MAGNESIUM: 4.6 MEQ/L (ref 3.5–5.2)
POTASSIUM SERPL-SCNC: 4.5 MEQ/L (ref 3.5–5.2)
SODIUM BLD-SCNC: 120 MEQ/L (ref 135–145)
SODIUM BLD-SCNC: 120 MEQ/L (ref 135–145)
SODIUM BLD-SCNC: 121 MEQ/L (ref 135–145)
SODIUM BLD-SCNC: 122 MEQ/L (ref 135–145)
SODIUM BLD-SCNC: 124 MEQ/L (ref 135–145)
TOTAL PROTEIN: 5.7 G/DL (ref 6.1–8)

## 2019-01-17 PROCEDURE — 99233 SBSQ HOSP IP/OBS HIGH 50: CPT | Performed by: HOSPITALIST

## 2019-01-17 PROCEDURE — 2580000003 HC RX 258: Performed by: ANESTHESIOLOGY

## 2019-01-17 PROCEDURE — 6370000000 HC RX 637 (ALT 250 FOR IP): Performed by: INTERNAL MEDICINE

## 2019-01-17 PROCEDURE — 80053 COMPREHEN METABOLIC PANEL: CPT

## 2019-01-17 PROCEDURE — 6370000000 HC RX 637 (ALT 250 FOR IP): Performed by: NURSE PRACTITIONER

## 2019-01-17 PROCEDURE — 84100 ASSAY OF PHOSPHORUS: CPT

## 2019-01-17 PROCEDURE — 80051 ELECTROLYTE PANEL: CPT

## 2019-01-17 PROCEDURE — 82248 BILIRUBIN DIRECT: CPT

## 2019-01-17 PROCEDURE — 83735 ASSAY OF MAGNESIUM: CPT

## 2019-01-17 PROCEDURE — 1200000003 HC TELEMETRY R&B

## 2019-01-17 PROCEDURE — 6370000000 HC RX 637 (ALT 250 FOR IP): Performed by: ANESTHESIOLOGY

## 2019-01-17 PROCEDURE — 85610 PROTHROMBIN TIME: CPT

## 2019-01-17 PROCEDURE — 97163 PT EVAL HIGH COMPLEX 45 MIN: CPT

## 2019-01-17 PROCEDURE — 99232 SBSQ HOSP IP/OBS MODERATE 35: CPT | Performed by: NURSE PRACTITIONER

## 2019-01-17 PROCEDURE — 6360000002 HC RX W HCPCS: Performed by: INTERNAL MEDICINE

## 2019-01-17 PROCEDURE — 2580000003 HC RX 258: Performed by: INTERNAL MEDICINE

## 2019-01-17 PROCEDURE — 97530 THERAPEUTIC ACTIVITIES: CPT

## 2019-01-17 PROCEDURE — 6370000000 HC RX 637 (ALT 250 FOR IP): Performed by: HOSPITALIST

## 2019-01-17 PROCEDURE — 36415 COLL VENOUS BLD VENIPUNCTURE: CPT

## 2019-01-17 RX ORDER — PREDNISOLONE SODIUM PHOSPHATE 15 MG/5ML
40 SOLUTION ORAL DAILY
Status: DISCONTINUED | OUTPATIENT
Start: 2019-01-18 | End: 2019-01-22 | Stop reason: HOSPADM

## 2019-01-17 RX ADMIN — PANTOPRAZOLE SODIUM 40 MG: 40 TABLET, DELAYED RELEASE ORAL at 17:37

## 2019-01-17 RX ADMIN — LEVOTHYROXINE SODIUM 88 MCG: 88 TABLET ORAL at 06:23

## 2019-01-17 RX ADMIN — SODIUM CHLORIDE TAB 1 GM 2 G: 1 TAB at 09:50

## 2019-01-17 RX ADMIN — RIFAXIMIN 550 MG: 550 TABLET ORAL at 09:50

## 2019-01-17 RX ADMIN — NADOLOL 20 MG: 40 TABLET ORAL at 09:49

## 2019-01-17 RX ADMIN — Medication 10 ML: at 09:55

## 2019-01-17 RX ADMIN — SODIUM CHLORIDE TAB 1 GM 2 G: 1 TAB at 17:37

## 2019-01-17 RX ADMIN — VITAMIN D, TAB 1000IU (100/BT) 5000 UNITS: 25 TAB at 09:50

## 2019-01-17 RX ADMIN — PREDNISONE 40 MG: 20 TABLET ORAL at 09:50

## 2019-01-17 RX ADMIN — Medication 10 ML: at 21:00

## 2019-01-17 RX ADMIN — PANTOPRAZOLE SODIUM 40 MG: 40 TABLET, DELAYED RELEASE ORAL at 06:23

## 2019-01-17 RX ADMIN — LORAZEPAM 1 MG: 1 TABLET ORAL at 22:38

## 2019-01-17 RX ADMIN — LORAZEPAM 2 MG: 2 INJECTION, SOLUTION INTRAMUSCULAR; INTRAVENOUS at 02:03

## 2019-01-17 RX ADMIN — CALCIUM GLUCONATE 1.5 G: 98 INJECTION, SOLUTION INTRAVENOUS at 13:09

## 2019-01-18 PROBLEM — E44.0 MODERATE MALNUTRITION (HCC): Chronic | Status: ACTIVE | Noted: 2019-01-18

## 2019-01-18 LAB
ALBUMIN SERPL-MCNC: 2 G/DL (ref 3.5–5.1)
ALP BLD-CCNC: 192 U/L (ref 38–126)
ALT SERPL-CCNC: 34 U/L (ref 11–66)
ANION GAP SERPL CALCULATED.3IONS-SCNC: 9 MEQ/L (ref 8–16)
AST SERPL-CCNC: 126 U/L (ref 5–40)
BACTERIA: ABNORMAL /HPF
BILIRUB SERPL-MCNC: 11.4 MG/DL (ref 0.3–1.2)
BILIRUBIN DIRECT: 8.4 MG/DL (ref 0–0.3)
BILIRUBIN URINE: ABNORMAL
BLOOD, URINE: NEGATIVE
BUN BLDV-MCNC: 3 MG/DL (ref 7–22)
CALCIUM SERPL-MCNC: 8.2 MG/DL (ref 8.5–10.5)
CASTS 2: ABNORMAL /LPF
CASTS UA: ABNORMAL /LPF
CHARACTER, URINE: ABNORMAL
CHLORIDE BLD-SCNC: 88 MEQ/L (ref 98–111)
CHLORIDE BLD-SCNC: 90 MEQ/L (ref 98–111)
CHLORIDE BLD-SCNC: 90 MEQ/L (ref 98–111)
CO2: 28 MEQ/L (ref 23–33)
COLOR: ABNORMAL
CREAT SERPL-MCNC: < 0.2 MG/DL (ref 0.4–1.2)
CRYSTALS, UA: ABNORMAL
EPITHELIAL CELLS, UA: ABNORMAL /HPF
ERYTHROCYTE [DISTWIDTH] IN BLOOD BY AUTOMATED COUNT: 18.6 % (ref 11.5–14.5)
ERYTHROCYTE [DISTWIDTH] IN BLOOD BY AUTOMATED COUNT: 65 FL (ref 35–45)
GFR SERPL CREATININE-BSD FRML MDRD: > 90 ML/MIN/1.73M2
GLUCOSE BLD-MCNC: 117 MG/DL (ref 70–108)
GLUCOSE URINE: NEGATIVE MG/DL
HCT VFR BLD CALC: 27.7 % (ref 37–47)
HEMOGLOBIN: 9.1 GM/DL (ref 12–16)
ICTOTEST: POSITIVE
INR BLD: 3.21 (ref 0.85–1.13)
KETONES, URINE: NEGATIVE
LEUKOCYTE ESTERASE, URINE: NEGATIVE
MCH RBC QN AUTO: 32.2 PG (ref 26–33)
MCHC RBC AUTO-ENTMCNC: 32.9 GM/DL (ref 32.2–35.5)
MCV RBC AUTO: 97.9 FL (ref 81–99)
MISCELLANEOUS 2: ABNORMAL
NITRITE, URINE: NEGATIVE
PH UA: 7
PLATELET # BLD: 93 THOU/MM3 (ref 130–400)
PMV BLD AUTO: 10.4 FL (ref 9.4–12.4)
POTASSIUM REFLEX MAGNESIUM: 3.8 MEQ/L (ref 3.5–5.2)
POTASSIUM REFLEX MAGNESIUM: 4.3 MEQ/L (ref 3.5–5.2)
POTASSIUM SERPL-SCNC: 3.7 MEQ/L (ref 3.5–5.2)
PROTEIN UA: NEGATIVE
RBC # BLD: 2.83 MILL/MM3 (ref 4.2–5.4)
RBC URINE: ABNORMAL /HPF
RENAL EPITHELIAL, UA: ABNORMAL
SODIUM BLD-SCNC: 125 MEQ/L (ref 135–145)
SODIUM BLD-SCNC: 127 MEQ/L (ref 135–145)
SODIUM BLD-SCNC: 127 MEQ/L (ref 135–145)
SPECIFIC GRAVITY, URINE: 1.01 (ref 1–1.03)
TOTAL PROTEIN: 4.9 G/DL (ref 6.1–8)
UROBILINOGEN, URINE: 2 EU/DL
WBC # BLD: 9.5 THOU/MM3 (ref 4.8–10.8)
WBC UA: ABNORMAL /HPF
YEAST: ABNORMAL

## 2019-01-18 PROCEDURE — 80053 COMPREHEN METABOLIC PANEL: CPT

## 2019-01-18 PROCEDURE — 99233 SBSQ HOSP IP/OBS HIGH 50: CPT | Performed by: HOSPITALIST

## 2019-01-18 PROCEDURE — 85027 COMPLETE CBC AUTOMATED: CPT

## 2019-01-18 PROCEDURE — 6370000000 HC RX 637 (ALT 250 FOR IP): Performed by: ANESTHESIOLOGY

## 2019-01-18 PROCEDURE — 97110 THERAPEUTIC EXERCISES: CPT

## 2019-01-18 PROCEDURE — 99999 PR OFFICE/OUTPT VISIT,PROCEDURE ONLY: CPT | Performed by: NURSE PRACTITIONER

## 2019-01-18 PROCEDURE — 85610 PROTHROMBIN TIME: CPT

## 2019-01-18 PROCEDURE — 97530 THERAPEUTIC ACTIVITIES: CPT

## 2019-01-18 PROCEDURE — 6370000000 HC RX 637 (ALT 250 FOR IP): Performed by: INTERNAL MEDICINE

## 2019-01-18 PROCEDURE — 97116 GAIT TRAINING THERAPY: CPT

## 2019-01-18 PROCEDURE — 6370000000 HC RX 637 (ALT 250 FOR IP): Performed by: NURSE PRACTITIONER

## 2019-01-18 PROCEDURE — 99232 SBSQ HOSP IP/OBS MODERATE 35: CPT | Performed by: INTERNAL MEDICINE

## 2019-01-18 PROCEDURE — 81001 URINALYSIS AUTO W/SCOPE: CPT

## 2019-01-18 PROCEDURE — 80051 ELECTROLYTE PANEL: CPT

## 2019-01-18 PROCEDURE — 82248 BILIRUBIN DIRECT: CPT

## 2019-01-18 PROCEDURE — 1200000003 HC TELEMETRY R&B

## 2019-01-18 PROCEDURE — 2580000003 HC RX 258: Performed by: ANESTHESIOLOGY

## 2019-01-18 PROCEDURE — 36415 COLL VENOUS BLD VENIPUNCTURE: CPT

## 2019-01-18 PROCEDURE — APPSS30 APP SPLIT SHARED TIME 16-30 MINUTES: Performed by: NURSE PRACTITIONER

## 2019-01-18 RX ORDER — FLUDROCORTISONE ACETATE 0.1 MG/1
0.1 TABLET ORAL DAILY
Status: DISCONTINUED | OUTPATIENT
Start: 2019-01-18 | End: 2019-01-22 | Stop reason: HOSPADM

## 2019-01-18 RX ORDER — MIDODRINE HYDROCHLORIDE 5 MG/1
5 TABLET ORAL
Status: DISCONTINUED | OUTPATIENT
Start: 2019-01-18 | End: 2019-01-20

## 2019-01-18 RX ORDER — SODIUM CHLORIDE 1000 MG
1 TABLET, SOLUBLE MISCELLANEOUS 2 TIMES DAILY WITH MEALS
Status: DISCONTINUED | OUTPATIENT
Start: 2019-01-18 | End: 2019-01-22 | Stop reason: HOSPADM

## 2019-01-18 RX ORDER — LACTULOSE 10 G/15ML
20 SOLUTION ORAL 2 TIMES DAILY
Status: DISCONTINUED | OUTPATIENT
Start: 2019-01-18 | End: 2019-01-19

## 2019-01-18 RX ADMIN — FLUDROCORTISONE ACETATE 0.1 MG: 0.1 TABLET ORAL at 11:33

## 2019-01-18 RX ADMIN — RIFAXIMIN 550 MG: 550 TABLET ORAL at 00:08

## 2019-01-18 RX ADMIN — PANTOPRAZOLE SODIUM 40 MG: 40 TABLET, DELAYED RELEASE ORAL at 16:38

## 2019-01-18 RX ADMIN — LACTULOSE 20 G: 20 SOLUTION ORAL at 20:56

## 2019-01-18 RX ADMIN — Medication 10 ML: at 08:34

## 2019-01-18 RX ADMIN — LACTULOSE 20 G: 20 SOLUTION ORAL at 14:58

## 2019-01-18 RX ADMIN — MIDODRINE HYDROCHLORIDE 5 MG: 5 TABLET ORAL at 11:33

## 2019-01-18 RX ADMIN — PANTOPRAZOLE SODIUM 40 MG: 40 TABLET, DELAYED RELEASE ORAL at 08:38

## 2019-01-18 RX ADMIN — VITAMIN D, TAB 1000IU (100/BT) 5000 UNITS: 25 TAB at 08:33

## 2019-01-18 RX ADMIN — RIFAXIMIN 550 MG: 550 TABLET ORAL at 20:56

## 2019-01-18 RX ADMIN — RIFAXIMIN 550 MG: 550 TABLET ORAL at 08:33

## 2019-01-18 RX ADMIN — SODIUM CHLORIDE TAB 1 GM 1 G: 1 TAB at 16:38

## 2019-01-18 RX ADMIN — Medication 10 ML: at 20:56

## 2019-01-18 RX ADMIN — MIDODRINE HYDROCHLORIDE 5 MG: 5 TABLET ORAL at 16:38

## 2019-01-18 RX ADMIN — LEVOTHYROXINE SODIUM 88 MCG: 88 TABLET ORAL at 08:33

## 2019-01-18 RX ADMIN — NADOLOL 20 MG: 40 TABLET ORAL at 08:33

## 2019-01-18 RX ADMIN — Medication 40 MG: at 11:33

## 2019-01-18 ASSESSMENT — PAIN SCALES - GENERAL
PAINLEVEL_OUTOF10: 0

## 2019-01-19 LAB
ALBUMIN SERPL-MCNC: 2.5 G/DL (ref 3.5–5.1)
ALP BLD-CCNC: 217 U/L (ref 38–126)
ALT SERPL-CCNC: 41 U/L (ref 11–66)
ANION GAP SERPL CALCULATED.3IONS-SCNC: 11 MEQ/L (ref 8–16)
AST SERPL-CCNC: 127 U/L (ref 5–40)
BILIRUB SERPL-MCNC: 13.6 MG/DL (ref 0.3–1.2)
BILIRUBIN DIRECT: 8.9 MG/DL (ref 0–0.3)
BUN BLDV-MCNC: 3 MG/DL (ref 7–22)
CALCIUM SERPL-MCNC: 8 MG/DL (ref 8.5–10.5)
CHLORIDE BLD-SCNC: 91 MEQ/L (ref 98–111)
CO2: 28 MEQ/L (ref 23–33)
CREAT SERPL-MCNC: < 0.2 MG/DL (ref 0.4–1.2)
ERYTHROCYTE [DISTWIDTH] IN BLOOD BY AUTOMATED COUNT: 18.5 % (ref 11.5–14.5)
ERYTHROCYTE [DISTWIDTH] IN BLOOD BY AUTOMATED COUNT: 67.4 FL (ref 35–45)
GFR SERPL CREATININE-BSD FRML MDRD: > 90 ML/MIN/1.73M2
GLUCOSE BLD-MCNC: 138 MG/DL (ref 70–108)
HCT VFR BLD CALC: 33.8 % (ref 37–47)
HEMOGLOBIN: 10.5 GM/DL (ref 12–16)
INR BLD: 2.61 (ref 0.85–1.13)
MCH RBC QN AUTO: 32 PG (ref 26–33)
MCHC RBC AUTO-ENTMCNC: 31.1 GM/DL (ref 32.2–35.5)
MCV RBC AUTO: 103 FL (ref 81–99)
PLATELET # BLD: 92 THOU/MM3 (ref 130–400)
PMV BLD AUTO: 9.9 FL (ref 9.4–12.4)
POTASSIUM SERPL-SCNC: 3.5 MEQ/L (ref 3.5–5.2)
RBC # BLD: 3.28 MILL/MM3 (ref 4.2–5.4)
SODIUM BLD-SCNC: 130 MEQ/L (ref 135–145)
TOTAL PROTEIN: 6 G/DL (ref 6.1–8)
WBC # BLD: 8.8 THOU/MM3 (ref 4.8–10.8)

## 2019-01-19 PROCEDURE — 6370000000 HC RX 637 (ALT 250 FOR IP): Performed by: INTERNAL MEDICINE

## 2019-01-19 PROCEDURE — 99231 SBSQ HOSP IP/OBS SF/LOW 25: CPT | Performed by: INTERNAL MEDICINE

## 2019-01-19 PROCEDURE — 90686 IIV4 VACC NO PRSV 0.5 ML IM: CPT | Performed by: INTERNAL MEDICINE

## 2019-01-19 PROCEDURE — G0008 ADMIN INFLUENZA VIRUS VAC: HCPCS | Performed by: INTERNAL MEDICINE

## 2019-01-19 PROCEDURE — 85027 COMPLETE CBC AUTOMATED: CPT

## 2019-01-19 PROCEDURE — 6370000000 HC RX 637 (ALT 250 FOR IP): Performed by: ANESTHESIOLOGY

## 2019-01-19 PROCEDURE — 6360000002 HC RX W HCPCS: Performed by: NURSE PRACTITIONER

## 2019-01-19 PROCEDURE — 6370000000 HC RX 637 (ALT 250 FOR IP): Performed by: NURSE PRACTITIONER

## 2019-01-19 PROCEDURE — 2580000003 HC RX 258: Performed by: NURSE PRACTITIONER

## 2019-01-19 PROCEDURE — 6360000002 HC RX W HCPCS: Performed by: INTERNAL MEDICINE

## 2019-01-19 PROCEDURE — 2580000003 HC RX 258: Performed by: ANESTHESIOLOGY

## 2019-01-19 PROCEDURE — 80053 COMPREHEN METABOLIC PANEL: CPT

## 2019-01-19 PROCEDURE — 85610 PROTHROMBIN TIME: CPT

## 2019-01-19 PROCEDURE — 99232 SBSQ HOSP IP/OBS MODERATE 35: CPT | Performed by: HOSPITALIST

## 2019-01-19 PROCEDURE — 1200000003 HC TELEMETRY R&B

## 2019-01-19 PROCEDURE — 36415 COLL VENOUS BLD VENIPUNCTURE: CPT

## 2019-01-19 PROCEDURE — 82248 BILIRUBIN DIRECT: CPT

## 2019-01-19 RX ORDER — POTASSIUM CHLORIDE 20 MEQ/1
40 TABLET, EXTENDED RELEASE ORAL ONCE
Status: COMPLETED | OUTPATIENT
Start: 2019-01-19 | End: 2019-01-19

## 2019-01-19 RX ADMIN — RIFAXIMIN 550 MG: 550 TABLET ORAL at 21:18

## 2019-01-19 RX ADMIN — THERA TABS 1 TABLET: TAB at 08:39

## 2019-01-19 RX ADMIN — PHYTONADIONE 5 MG: 10 INJECTION, EMULSION INTRAMUSCULAR; INTRAVENOUS; SUBCUTANEOUS at 12:19

## 2019-01-19 RX ADMIN — MIDODRINE HYDROCHLORIDE 5 MG: 5 TABLET ORAL at 17:04

## 2019-01-19 RX ADMIN — SODIUM CHLORIDE TAB 1 GM 1 G: 1 TAB at 17:04

## 2019-01-19 RX ADMIN — PANTOPRAZOLE SODIUM 40 MG: 40 TABLET, DELAYED RELEASE ORAL at 06:14

## 2019-01-19 RX ADMIN — NADOLOL 20 MG: 40 TABLET ORAL at 08:39

## 2019-01-19 RX ADMIN — MIDODRINE HYDROCHLORIDE 5 MG: 5 TABLET ORAL at 12:19

## 2019-01-19 RX ADMIN — RIFAXIMIN 550 MG: 550 TABLET ORAL at 08:38

## 2019-01-19 RX ADMIN — MIDODRINE HYDROCHLORIDE 5 MG: 5 TABLET ORAL at 08:38

## 2019-01-19 RX ADMIN — Medication 100 MG: at 08:38

## 2019-01-19 RX ADMIN — FOLIC ACID 1 MG: 1 TABLET ORAL at 08:38

## 2019-01-19 RX ADMIN — PANTOPRAZOLE SODIUM 40 MG: 40 TABLET, DELAYED RELEASE ORAL at 17:05

## 2019-01-19 RX ADMIN — Medication 10 ML: at 08:39

## 2019-01-19 RX ADMIN — VITAMIN D, TAB 1000IU (100/BT) 5000 UNITS: 25 TAB at 08:38

## 2019-01-19 RX ADMIN — SODIUM CHLORIDE TAB 1 GM 1 G: 1 TAB at 08:39

## 2019-01-19 RX ADMIN — LEVOTHYROXINE SODIUM 88 MCG: 88 TABLET ORAL at 06:14

## 2019-01-19 RX ADMIN — POTASSIUM CHLORIDE 40 MEQ: 20 TABLET, EXTENDED RELEASE ORAL at 12:18

## 2019-01-19 RX ADMIN — Medication 40 MG: at 08:52

## 2019-01-19 RX ADMIN — INFLUENZA A VIRUS A/MICHIGAN/45/2015 X-275 (H1N1) ANTIGEN (FORMALDEHYDE INACTIVATED), INFLUENZA A VIRUS A/SINGAPORE/INFIMH-16-0019/2016 IVR-186 (H3N2) ANTIGEN (FORMALDEHYDE INACTIVATED), INFLUENZA B VIRUS B/PHUKET/3073/2013 ANTIGEN (FORMALDEHYDE INACTIVATED), AND INFLUENZA B VIRUS B/MARYLAND/15/2016 BX-69A ANTIGEN (FORMALDEHYDE INACTIVATED) 0.5 ML: 15; 15; 15; 15 INJECTION, SUSPENSION INTRAMUSCULAR at 06:16

## 2019-01-19 RX ADMIN — FLUDROCORTISONE ACETATE 0.1 MG: 0.1 TABLET ORAL at 08:39

## 2019-01-19 RX ADMIN — Medication 10 ML: at 21:18

## 2019-01-19 ASSESSMENT — PAIN SCALES - GENERAL
PAINLEVEL_OUTOF10: 0
PAINLEVEL_OUTOF10: 0

## 2019-01-20 LAB
ALBUMIN SERPL-MCNC: 2.1 G/DL (ref 3.5–5.1)
ALBUMIN SERPL-MCNC: 2.1 G/DL (ref 3.5–5.1)
ALP BLD-CCNC: 165 U/L (ref 38–126)
ALP BLD-CCNC: 186 U/L (ref 38–126)
ALT SERPL-CCNC: 30 U/L (ref 11–66)
ALT SERPL-CCNC: 35 U/L (ref 11–66)
ANION GAP SERPL CALCULATED.3IONS-SCNC: 5 MEQ/L (ref 8–16)
AST SERPL-CCNC: 109 U/L (ref 5–40)
AST SERPL-CCNC: 84 U/L (ref 5–40)
BILIRUB SERPL-MCNC: 10.4 MG/DL (ref 0.3–1.2)
BILIRUB SERPL-MCNC: 11.8 MG/DL (ref 0.3–1.2)
BILIRUBIN DIRECT: 7.2 MG/DL (ref 0–0.3)
BILIRUBIN DIRECT: 7.6 MG/DL (ref 0–0.3)
BUN BLDV-MCNC: 4 MG/DL (ref 7–22)
CALCIUM SERPL-MCNC: 7.5 MG/DL (ref 8.5–10.5)
CHLORIDE BLD-SCNC: 94 MEQ/L (ref 98–111)
CO2: 31 MEQ/L (ref 23–33)
CREAT SERPL-MCNC: < 0.2 MG/DL (ref 0.4–1.2)
ERYTHROCYTE [DISTWIDTH] IN BLOOD BY AUTOMATED COUNT: 18.4 % (ref 11.5–14.5)
ERYTHROCYTE [DISTWIDTH] IN BLOOD BY AUTOMATED COUNT: 65.5 FL (ref 35–45)
GFR SERPL CREATININE-BSD FRML MDRD: > 90 ML/MIN/1.73M2
GLUCOSE BLD-MCNC: 90 MG/DL (ref 70–108)
HCT VFR BLD CALC: 27.4 % (ref 37–47)
HCT VFR BLD CALC: 29.9 % (ref 37–47)
HEMOGLOBIN: 8.7 GM/DL (ref 12–16)
HEMOGLOBIN: 9.5 GM/DL (ref 12–16)
INR BLD: 2.77 (ref 0.85–1.13)
MCH RBC QN AUTO: 32.1 PG (ref 26–33)
MCHC RBC AUTO-ENTMCNC: 31.8 GM/DL (ref 32.2–35.5)
MCV RBC AUTO: 101.1 FL (ref 81–99)
PLATELET # BLD: 89 THOU/MM3 (ref 130–400)
PMV BLD AUTO: 9.5 FL (ref 9.4–12.4)
POTASSIUM SERPL-SCNC: 4 MEQ/L (ref 3.5–5.2)
RBC # BLD: 2.71 MILL/MM3 (ref 4.2–5.4)
SODIUM BLD-SCNC: 130 MEQ/L (ref 135–145)
TOTAL PROTEIN: 4.7 G/DL (ref 6.1–8)
TOTAL PROTEIN: 5.4 G/DL (ref 6.1–8)
WBC # BLD: 7 THOU/MM3 (ref 4.8–10.8)

## 2019-01-20 PROCEDURE — 82248 BILIRUBIN DIRECT: CPT

## 2019-01-20 PROCEDURE — 99232 SBSQ HOSP IP/OBS MODERATE 35: CPT | Performed by: INTERNAL MEDICINE

## 2019-01-20 PROCEDURE — 6370000000 HC RX 637 (ALT 250 FOR IP): Performed by: INTERNAL MEDICINE

## 2019-01-20 PROCEDURE — 99232 SBSQ HOSP IP/OBS MODERATE 35: CPT | Performed by: HOSPITALIST

## 2019-01-20 PROCEDURE — 80053 COMPREHEN METABOLIC PANEL: CPT

## 2019-01-20 PROCEDURE — 6370000000 HC RX 637 (ALT 250 FOR IP): Performed by: NURSE PRACTITIONER

## 2019-01-20 PROCEDURE — 85027 COMPLETE CBC AUTOMATED: CPT

## 2019-01-20 PROCEDURE — 85018 HEMOGLOBIN: CPT

## 2019-01-20 PROCEDURE — 6370000000 HC RX 637 (ALT 250 FOR IP): Performed by: ANESTHESIOLOGY

## 2019-01-20 PROCEDURE — 36415 COLL VENOUS BLD VENIPUNCTURE: CPT

## 2019-01-20 PROCEDURE — 85014 HEMATOCRIT: CPT

## 2019-01-20 PROCEDURE — 85610 PROTHROMBIN TIME: CPT

## 2019-01-20 PROCEDURE — 80076 HEPATIC FUNCTION PANEL: CPT

## 2019-01-20 PROCEDURE — 1200000003 HC TELEMETRY R&B

## 2019-01-20 PROCEDURE — 2580000003 HC RX 258: Performed by: ANESTHESIOLOGY

## 2019-01-20 RX ORDER — MIDODRINE HYDROCHLORIDE 10 MG/1
10 TABLET ORAL
Status: DISCONTINUED | OUTPATIENT
Start: 2019-01-20 | End: 2019-01-22 | Stop reason: HOSPADM

## 2019-01-20 RX ADMIN — MIDODRINE HYDROCHLORIDE 5 MG: 5 TABLET ORAL at 08:28

## 2019-01-20 RX ADMIN — NADOLOL 20 MG: 40 TABLET ORAL at 08:27

## 2019-01-20 RX ADMIN — RIFAXIMIN 550 MG: 550 TABLET ORAL at 08:28

## 2019-01-20 RX ADMIN — SODIUM CHLORIDE TAB 1 GM 1 G: 1 TAB at 08:28

## 2019-01-20 RX ADMIN — MIDODRINE HYDROCHLORIDE 10 MG: 10 TABLET ORAL at 17:09

## 2019-01-20 RX ADMIN — Medication 10 ML: at 19:58

## 2019-01-20 RX ADMIN — Medication 40 MG: at 08:35

## 2019-01-20 RX ADMIN — FLUDROCORTISONE ACETATE 0.1 MG: 0.1 TABLET ORAL at 08:28

## 2019-01-20 RX ADMIN — Medication 100 MG: at 08:28

## 2019-01-20 RX ADMIN — PANTOPRAZOLE SODIUM 40 MG: 40 TABLET, DELAYED RELEASE ORAL at 08:27

## 2019-01-20 RX ADMIN — VITAMIN D, TAB 1000IU (100/BT) 5000 UNITS: 25 TAB at 08:28

## 2019-01-20 RX ADMIN — Medication 10 ML: at 08:27

## 2019-01-20 RX ADMIN — RIFAXIMIN 550 MG: 550 TABLET ORAL at 19:58

## 2019-01-20 RX ADMIN — SODIUM CHLORIDE TAB 1 GM 1 G: 1 TAB at 17:09

## 2019-01-20 RX ADMIN — THERA TABS 1 TABLET: TAB at 08:28

## 2019-01-20 RX ADMIN — PANTOPRAZOLE SODIUM 40 MG: 40 TABLET, DELAYED RELEASE ORAL at 17:09

## 2019-01-20 RX ADMIN — FOLIC ACID 1 MG: 1 TABLET ORAL at 08:28

## 2019-01-20 RX ADMIN — MIDODRINE HYDROCHLORIDE 10 MG: 10 TABLET ORAL at 11:31

## 2019-01-20 RX ADMIN — LEVOTHYROXINE SODIUM 88 MCG: 88 TABLET ORAL at 08:28

## 2019-01-20 ASSESSMENT — PAIN SCALES - GENERAL: PAINLEVEL_OUTOF10: 0

## 2019-01-21 ENCOUNTER — APPOINTMENT (OUTPATIENT)
Dept: ULTRASOUND IMAGING | Age: 39
DRG: 433 | End: 2019-01-21
Payer: MEDICARE

## 2019-01-21 LAB
ANAEROBIC CULTURE: NORMAL
ANION GAP SERPL CALCULATED.3IONS-SCNC: 8 MEQ/L (ref 8–16)
BODY FLUID CULTURE, STERILE: NORMAL
BUN BLDV-MCNC: 6 MG/DL (ref 7–22)
CALCIUM SERPL-MCNC: 7.8 MG/DL (ref 8.5–10.5)
CHLORIDE BLD-SCNC: 98 MEQ/L (ref 98–111)
CO2: 28 MEQ/L (ref 23–33)
CREAT SERPL-MCNC: < 0.2 MG/DL (ref 0.4–1.2)
ERYTHROCYTE [DISTWIDTH] IN BLOOD BY AUTOMATED COUNT: 18 % (ref 11.5–14.5)
ERYTHROCYTE [DISTWIDTH] IN BLOOD BY AUTOMATED COUNT: 66.4 FL (ref 35–45)
GFR SERPL CREATININE-BSD FRML MDRD: > 90 ML/MIN/1.73M2
GLUCOSE BLD-MCNC: 87 MG/DL (ref 70–108)
GRAM STAIN RESULT: NORMAL
HCT VFR BLD CALC: 29.2 % (ref 37–47)
HCT VFR BLD CALC: 30 % (ref 37–47)
HCT VFR BLD CALC: 31.1 % (ref 37–47)
HCT VFR BLD CALC: 32.4 % (ref 37–47)
HEMOGLOBIN: 10.1 GM/DL (ref 12–16)
HEMOGLOBIN: 9.3 GM/DL (ref 12–16)
HEMOGLOBIN: 9.4 GM/DL (ref 12–16)
HEMOGLOBIN: 9.9 GM/DL (ref 12–16)
LD, FLUID: 40 U/L
MCH RBC QN AUTO: 32.9 PG (ref 26–33)
MCHC RBC AUTO-ENTMCNC: 31.8 GM/DL (ref 32.2–35.5)
MCV RBC AUTO: 103.2 FL (ref 81–99)
PLATELET # BLD: 96 THOU/MM3 (ref 130–400)
PMV BLD AUTO: 10 FL (ref 9.4–12.4)
POTASSIUM SERPL-SCNC: 4.2 MEQ/L (ref 3.5–5.2)
PROTEIN FLUID: 0.6 GM/DL
RBC # BLD: 2.83 MILL/MM3 (ref 4.2–5.4)
SODIUM BLD-SCNC: 134 MEQ/L (ref 135–145)
WBC # BLD: 7.6 THOU/MM3 (ref 4.8–10.8)

## 2019-01-21 PROCEDURE — 89050 BODY FLUID CELL COUNT: CPT

## 2019-01-21 PROCEDURE — 49083 ABD PARACENTESIS W/IMAGING: CPT

## 2019-01-21 PROCEDURE — 99232 SBSQ HOSP IP/OBS MODERATE 35: CPT | Performed by: NURSE PRACTITIONER

## 2019-01-21 PROCEDURE — 99233 SBSQ HOSP IP/OBS HIGH 50: CPT | Performed by: HOSPITALIST

## 2019-01-21 PROCEDURE — 36415 COLL VENOUS BLD VENIPUNCTURE: CPT

## 2019-01-21 PROCEDURE — 87075 CULTR BACTERIA EXCEPT BLOOD: CPT

## 2019-01-21 PROCEDURE — 85014 HEMATOCRIT: CPT

## 2019-01-21 PROCEDURE — 85018 HEMOGLOBIN: CPT

## 2019-01-21 PROCEDURE — 84157 ASSAY OF PROTEIN OTHER: CPT

## 2019-01-21 PROCEDURE — 97116 GAIT TRAINING THERAPY: CPT

## 2019-01-21 PROCEDURE — 6370000000 HC RX 637 (ALT 250 FOR IP): Performed by: ANESTHESIOLOGY

## 2019-01-21 PROCEDURE — 0W9G3ZZ DRAINAGE OF PERITONEAL CAVITY, PERCUTANEOUS APPROACH: ICD-10-PCS | Performed by: RADIOLOGY

## 2019-01-21 PROCEDURE — 2580000003 HC RX 258: Performed by: ANESTHESIOLOGY

## 2019-01-21 PROCEDURE — 6370000000 HC RX 637 (ALT 250 FOR IP): Performed by: INTERNAL MEDICINE

## 2019-01-21 PROCEDURE — 1200000003 HC TELEMETRY R&B

## 2019-01-21 PROCEDURE — 6370000000 HC RX 637 (ALT 250 FOR IP): Performed by: NURSE PRACTITIONER

## 2019-01-21 PROCEDURE — 80048 BASIC METABOLIC PNL TOTAL CA: CPT

## 2019-01-21 PROCEDURE — 85027 COMPLETE CBC AUTOMATED: CPT

## 2019-01-21 PROCEDURE — 6360000002 HC RX W HCPCS: Performed by: HOSPITALIST

## 2019-01-21 PROCEDURE — 83615 LACTATE (LD) (LDH) ENZYME: CPT

## 2019-01-21 PROCEDURE — 87070 CULTURE OTHR SPECIMN AEROBIC: CPT

## 2019-01-21 PROCEDURE — 87205 SMEAR GRAM STAIN: CPT

## 2019-01-21 RX ORDER — ONDANSETRON 4 MG/1
4 TABLET, ORALLY DISINTEGRATING ORAL ONCE
Status: COMPLETED | OUTPATIENT
Start: 2019-01-21 | End: 2019-01-21

## 2019-01-21 RX ORDER — LACTULOSE 10 G/15ML
20 SOLUTION ORAL 2 TIMES DAILY
Status: DISCONTINUED | OUTPATIENT
Start: 2019-01-21 | End: 2019-01-22 | Stop reason: HOSPADM

## 2019-01-21 RX ADMIN — THERA TABS 1 TABLET: TAB at 09:10

## 2019-01-21 RX ADMIN — Medication 10 ML: at 20:57

## 2019-01-21 RX ADMIN — FLUDROCORTISONE ACETATE 0.1 MG: 0.1 TABLET ORAL at 09:10

## 2019-01-21 RX ADMIN — MIDODRINE HYDROCHLORIDE 10 MG: 10 TABLET ORAL at 12:00

## 2019-01-21 RX ADMIN — Medication 100 MG: at 09:10

## 2019-01-21 RX ADMIN — LEVOTHYROXINE SODIUM 88 MCG: 88 TABLET ORAL at 09:10

## 2019-01-21 RX ADMIN — RIFAXIMIN 550 MG: 550 TABLET ORAL at 09:10

## 2019-01-21 RX ADMIN — LACTULOSE 20 G: 20 SOLUTION ORAL at 20:57

## 2019-01-21 RX ADMIN — SODIUM CHLORIDE TAB 1 GM 1 G: 1 TAB at 16:50

## 2019-01-21 RX ADMIN — PANTOPRAZOLE SODIUM 40 MG: 40 TABLET, DELAYED RELEASE ORAL at 16:50

## 2019-01-21 RX ADMIN — VITAMIN D, TAB 1000IU (100/BT) 5000 UNITS: 25 TAB at 09:10

## 2019-01-21 RX ADMIN — MIDODRINE HYDROCHLORIDE 10 MG: 10 TABLET ORAL at 16:50

## 2019-01-21 RX ADMIN — RIFAXIMIN 550 MG: 550 TABLET ORAL at 20:57

## 2019-01-21 RX ADMIN — Medication 40 MG: at 09:19

## 2019-01-21 RX ADMIN — Medication 10 ML: at 09:10

## 2019-01-21 RX ADMIN — SODIUM CHLORIDE TAB 1 GM 1 G: 1 TAB at 09:10

## 2019-01-21 RX ADMIN — FOLIC ACID 1 MG: 1 TABLET ORAL at 09:10

## 2019-01-21 RX ADMIN — LACTULOSE 20 G: 20 SOLUTION ORAL at 12:02

## 2019-01-21 RX ADMIN — ONDANSETRON 4 MG: 4 TABLET, ORALLY DISINTEGRATING ORAL at 09:21

## 2019-01-21 RX ADMIN — PANTOPRAZOLE SODIUM 40 MG: 40 TABLET, DELAYED RELEASE ORAL at 09:10

## 2019-01-21 RX ADMIN — MIDODRINE HYDROCHLORIDE 10 MG: 10 TABLET ORAL at 09:10

## 2019-01-21 ASSESSMENT — PAIN DESCRIPTION - PROGRESSION: CLINICAL_PROGRESSION: NOT CHANGED

## 2019-01-21 ASSESSMENT — PAIN - FUNCTIONAL ASSESSMENT: PAIN_FUNCTIONAL_ASSESSMENT: PREVENTS OR INTERFERES SOME ACTIVE ACTIVITIES AND ADLS

## 2019-01-21 ASSESSMENT — PAIN SCALES - GENERAL
PAINLEVEL_OUTOF10: 0
PAINLEVEL_OUTOF10: 0
PAINLEVEL_OUTOF10: 7

## 2019-01-21 ASSESSMENT — PAIN DESCRIPTION - ONSET: ONSET: ON-GOING

## 2019-01-21 ASSESSMENT — PAIN DESCRIPTION - DIRECTION: RADIATING_TOWARDS: STOMACH

## 2019-01-21 ASSESSMENT — PAIN DESCRIPTION - LOCATION: LOCATION: ABDOMEN

## 2019-01-21 ASSESSMENT — PAIN DESCRIPTION - FREQUENCY: FREQUENCY: CONTINUOUS

## 2019-01-21 ASSESSMENT — PAIN DESCRIPTION - PAIN TYPE: TYPE: ACUTE PAIN

## 2019-01-21 ASSESSMENT — PAIN DESCRIPTION - ORIENTATION: ORIENTATION: MID

## 2019-01-21 ASSESSMENT — PAIN DESCRIPTION - DESCRIPTORS: DESCRIPTORS: ACHING

## 2019-01-22 VITALS
RESPIRATION RATE: 16 BRPM | TEMPERATURE: 98 F | OXYGEN SATURATION: 94 % | BODY MASS INDEX: 25.6 KG/M2 | SYSTOLIC BLOOD PRESSURE: 103 MMHG | DIASTOLIC BLOOD PRESSURE: 60 MMHG | HEIGHT: 63 IN | WEIGHT: 144.5 LBS | HEART RATE: 98 BPM

## 2019-01-22 LAB
ALBUMIN SERPL-MCNC: 2.2 G/DL (ref 3.5–5.1)
ALP BLD-CCNC: 159 U/L (ref 38–126)
ALT SERPL-CCNC: 29 U/L (ref 11–66)
ANION GAP SERPL CALCULATED.3IONS-SCNC: 8 MEQ/L (ref 8–16)
AST SERPL-CCNC: 67 U/L (ref 5–40)
BILIRUB SERPL-MCNC: 11 MG/DL (ref 0.3–1.2)
BILIRUBIN DIRECT: 7.3 MG/DL (ref 0–0.3)
BODY FLUID RBC: < 2000 /CUMM
BUN BLDV-MCNC: 6 MG/DL (ref 7–22)
CALCIUM SERPL-MCNC: 7.8 MG/DL (ref 8.5–10.5)
CHARACTER, BODY FLUID: CLEAR
CHLORIDE BLD-SCNC: 98 MEQ/L (ref 98–111)
CO2: 28 MEQ/L (ref 23–33)
COLOR: YELLOW
CREAT SERPL-MCNC: < 0.2 MG/DL (ref 0.4–1.2)
ERYTHROCYTE [DISTWIDTH] IN BLOOD BY AUTOMATED COUNT: 17.4 % (ref 11.5–14.5)
ERYTHROCYTE [DISTWIDTH] IN BLOOD BY AUTOMATED COUNT: 65.1 FL (ref 35–45)
GFR SERPL CREATININE-BSD FRML MDRD: > 90 ML/MIN/1.73M2
GLUCOSE BLD-MCNC: 94 MG/DL (ref 70–108)
HCT VFR BLD CALC: 29.7 % (ref 37–47)
HCT VFR BLD CALC: 29.9 % (ref 37–47)
HCT VFR BLD CALC: 31 % (ref 37–47)
HEMOGLOBIN: 9.3 GM/DL (ref 12–16)
HEMOGLOBIN: 9.6 GM/DL (ref 12–16)
HEMOGLOBIN: 9.9 GM/DL (ref 12–16)
INR BLD: 2.55 (ref 0.85–1.13)
MCH RBC QN AUTO: 32.3 PG (ref 26–33)
MCHC RBC AUTO-ENTMCNC: 31.3 GM/DL (ref 32.2–35.5)
MCV RBC AUTO: 103.1 FL (ref 81–99)
MONONUCLEAR CELLS BODY FLUID: 84.7 %
PATHOLOGIST REVIEW: NORMAL
PLATELET # BLD: 105 THOU/MM3 (ref 130–400)
PMV BLD AUTO: 11.1 FL (ref 9.4–12.4)
POLYMORPHONUCLEAR CELLS BODY FLUID: 15.3 %
POTASSIUM SERPL-SCNC: 3.8 MEQ/L (ref 3.5–5.2)
RBC # BLD: 2.88 MILL/MM3 (ref 4.2–5.4)
SODIUM BLD-SCNC: 134 MEQ/L (ref 135–145)
SPECIMEN: NORMAL
TOTAL NUCLEATED CELLS BODY FLUID: 15 /CUMM (ref 0–500)
TOTAL PROTEIN: 5 G/DL (ref 6.1–8)
TOTAL VOLUME RECEIVED BODY FLUID: 80 ML
WBC # BLD: 9.1 THOU/MM3 (ref 4.8–10.8)

## 2019-01-22 PROCEDURE — 36415 COLL VENOUS BLD VENIPUNCTURE: CPT

## 2019-01-22 PROCEDURE — 6370000000 HC RX 637 (ALT 250 FOR IP): Performed by: INTERNAL MEDICINE

## 2019-01-22 PROCEDURE — 82248 BILIRUBIN DIRECT: CPT

## 2019-01-22 PROCEDURE — 6360000002 HC RX W HCPCS: Performed by: ANESTHESIOLOGY

## 2019-01-22 PROCEDURE — 80053 COMPREHEN METABOLIC PANEL: CPT

## 2019-01-22 PROCEDURE — 97110 THERAPEUTIC EXERCISES: CPT

## 2019-01-22 PROCEDURE — 6370000000 HC RX 637 (ALT 250 FOR IP): Performed by: NURSE PRACTITIONER

## 2019-01-22 PROCEDURE — 85014 HEMATOCRIT: CPT

## 2019-01-22 PROCEDURE — 97116 GAIT TRAINING THERAPY: CPT

## 2019-01-22 PROCEDURE — 99999 PR OFFICE/OUTPT VISIT,PROCEDURE ONLY: CPT | Performed by: NURSE PRACTITIONER

## 2019-01-22 PROCEDURE — 99232 SBSQ HOSP IP/OBS MODERATE 35: CPT | Performed by: INTERNAL MEDICINE

## 2019-01-22 PROCEDURE — APPSS30 APP SPLIT SHARED TIME 16-30 MINUTES: Performed by: NURSE PRACTITIONER

## 2019-01-22 PROCEDURE — 85027 COMPLETE CBC AUTOMATED: CPT

## 2019-01-22 PROCEDURE — 6370000000 HC RX 637 (ALT 250 FOR IP): Performed by: ANESTHESIOLOGY

## 2019-01-22 PROCEDURE — 85018 HEMOGLOBIN: CPT

## 2019-01-22 PROCEDURE — 2580000003 HC RX 258: Performed by: ANESTHESIOLOGY

## 2019-01-22 PROCEDURE — 85610 PROTHROMBIN TIME: CPT

## 2019-01-22 PROCEDURE — 99233 SBSQ HOSP IP/OBS HIGH 50: CPT | Performed by: HOSPITALIST

## 2019-01-22 RX ORDER — MIDODRINE HYDROCHLORIDE 10 MG/1
10 TABLET ORAL
Qty: 90 TABLET | Refills: 1 | Status: SHIPPED | OUTPATIENT
Start: 2019-01-22

## 2019-01-22 RX ORDER — ONDANSETRON 4 MG/1
4 TABLET, FILM COATED ORAL EVERY 12 HOURS PRN
Qty: 14 TABLET | Refills: 1 | Status: SHIPPED | OUTPATIENT
Start: 2019-01-22 | End: 2019-01-29

## 2019-01-22 RX ORDER — ACETAMINOPHEN 325 MG/1
325 TABLET ORAL EVERY 4 HOURS PRN
Qty: 120 TABLET | Refills: 3 | Status: ON HOLD | OUTPATIENT
Start: 2019-01-22 | End: 2019-01-30 | Stop reason: ALTCHOICE

## 2019-01-22 RX ORDER — SODIUM CHLORIDE 1000 MG
1 TABLET, SOLUBLE MISCELLANEOUS 2 TIMES DAILY WITH MEALS
Qty: 90 TABLET | Refills: 1 | Status: ON HOLD | OUTPATIENT
Start: 2019-01-22 | End: 2019-02-08 | Stop reason: HOSPADM

## 2019-01-22 RX ORDER — PREDNISOLONE SODIUM PHOSPHATE 15 MG/5ML
40 SOLUTION ORAL DAILY
Qty: 279.3 ML | Refills: 0 | Status: SHIPPED | OUTPATIENT
Start: 2019-01-23 | End: 2019-02-13

## 2019-01-22 RX ORDER — MULTIVITAMIN WITH FOLIC ACID 400 MCG
1 TABLET ORAL DAILY
Qty: 30 TABLET | Refills: 3 | Status: SHIPPED | OUTPATIENT
Start: 2019-01-23

## 2019-01-22 RX ORDER — LACTULOSE 10 G/15ML
20 SOLUTION ORAL 2 TIMES DAILY
Qty: 360 ML | Refills: 1 | Status: SHIPPED | OUTPATIENT
Start: 2019-01-22 | End: 2019-02-21

## 2019-01-22 RX ORDER — FLUDROCORTISONE ACETATE 0.1 MG/1
0.1 TABLET ORAL DAILY
Qty: 30 TABLET | Refills: 1 | Status: SHIPPED | OUTPATIENT
Start: 2019-01-23 | End: 2019-02-22

## 2019-01-22 RX ADMIN — PANTOPRAZOLE SODIUM 40 MG: 40 TABLET, DELAYED RELEASE ORAL at 08:37

## 2019-01-22 RX ADMIN — Medication 10 ML: at 08:36

## 2019-01-22 RX ADMIN — VITAMIN D, TAB 1000IU (100/BT) 5000 UNITS: 25 TAB at 08:37

## 2019-01-22 RX ADMIN — TRAMADOL HYDROCHLORIDE 50 MG: 50 TABLET, FILM COATED ORAL at 03:19

## 2019-01-22 RX ADMIN — Medication 40 MG: at 08:36

## 2019-01-22 RX ADMIN — FOLIC ACID 1 MG: 1 TABLET ORAL at 08:37

## 2019-01-22 RX ADMIN — MIDODRINE HYDROCHLORIDE 10 MG: 10 TABLET ORAL at 11:52

## 2019-01-22 RX ADMIN — FLUDROCORTISONE ACETATE 0.1 MG: 0.1 TABLET ORAL at 08:37

## 2019-01-22 RX ADMIN — THERA TABS 1 TABLET: TAB at 08:37

## 2019-01-22 RX ADMIN — LACTULOSE 20 G: 20 SOLUTION ORAL at 08:38

## 2019-01-22 RX ADMIN — SODIUM CHLORIDE TAB 1 GM 1 G: 1 TAB at 08:37

## 2019-01-22 RX ADMIN — LEVOTHYROXINE SODIUM 88 MCG: 88 TABLET ORAL at 08:37

## 2019-01-22 RX ADMIN — NADOLOL 20 MG: 40 TABLET ORAL at 08:37

## 2019-01-22 RX ADMIN — TRIMETHOBENZAMIDE HYDROCHLORIDE 200 MG: 100 INJECTION INTRAMUSCULAR at 08:18

## 2019-01-22 RX ADMIN — Medication 100 MG: at 08:37

## 2019-01-22 RX ADMIN — MIDODRINE HYDROCHLORIDE 10 MG: 10 TABLET ORAL at 08:37

## 2019-01-22 RX ADMIN — RIFAXIMIN 550 MG: 550 TABLET ORAL at 08:37

## 2019-01-22 RX ADMIN — TRAMADOL HYDROCHLORIDE 50 MG: 50 TABLET, FILM COATED ORAL at 09:40

## 2019-01-22 ASSESSMENT — PAIN DESCRIPTION - LOCATION
LOCATION: ABDOMEN

## 2019-01-22 ASSESSMENT — PAIN DESCRIPTION - PROGRESSION

## 2019-01-22 ASSESSMENT — PAIN SCALES - GENERAL
PAINLEVEL_OUTOF10: 9
PAINLEVEL_OUTOF10: 8
PAINLEVEL_OUTOF10: 8
PAINLEVEL_OUTOF10: 9

## 2019-01-22 ASSESSMENT — PAIN DESCRIPTION - DESCRIPTORS
DESCRIPTORS: ACHING

## 2019-01-22 ASSESSMENT — PAIN DESCRIPTION - PAIN TYPE
TYPE: ACUTE PAIN

## 2019-01-22 ASSESSMENT — PAIN DESCRIPTION - ORIENTATION
ORIENTATION: MID

## 2019-01-22 ASSESSMENT — PAIN DESCRIPTION - FREQUENCY
FREQUENCY: CONTINUOUS

## 2019-01-22 ASSESSMENT — PAIN DESCRIPTION - ONSET: ONSET: ON-GOING

## 2019-01-26 LAB
ANAEROBIC CULTURE: NORMAL
BODY FLUID CULTURE, STERILE: NORMAL
GRAM STAIN RESULT: NORMAL

## 2019-01-29 ENCOUNTER — TELEPHONE (OUTPATIENT)
Dept: NEPHROLOGY | Age: 39
End: 2019-01-29

## 2019-01-29 ENCOUNTER — HOSPITAL ENCOUNTER (OUTPATIENT)
Age: 39
Setting detail: OUTPATIENT SURGERY
Discharge: HOME | End: 2019-01-29
Attending: INTERNAL MEDICINE | Admitting: INTERNAL MEDICINE
Payer: MEDICARE

## 2019-01-29 ENCOUNTER — ANESTHESIA (OUTPATIENT)
Dept: ENDOSCOPY | Age: 39
End: 2019-01-29
Payer: MEDICARE

## 2019-01-29 ENCOUNTER — ANESTHESIA EVENT (OUTPATIENT)
Dept: ENDOSCOPY | Age: 39
End: 2019-01-29
Payer: MEDICARE

## 2019-01-29 VITALS
DIASTOLIC BLOOD PRESSURE: 55 MMHG | RESPIRATION RATE: 15 BRPM | SYSTOLIC BLOOD PRESSURE: 93 MMHG | OXYGEN SATURATION: 98 %

## 2019-01-29 VITALS
HEART RATE: 59 BPM | RESPIRATION RATE: 16 BRPM | SYSTOLIC BLOOD PRESSURE: 112 MMHG | BODY MASS INDEX: 25.34 KG/M2 | HEIGHT: 65 IN | WEIGHT: 152.13 LBS | OXYGEN SATURATION: 99 % | DIASTOLIC BLOOD PRESSURE: 71 MMHG | TEMPERATURE: 97.3 F

## 2019-01-29 PROCEDURE — 6360000002 HC RX W HCPCS: Performed by: NURSE ANESTHETIST, CERTIFIED REGISTERED

## 2019-01-29 PROCEDURE — 7100000001 HC PACU RECOVERY - ADDTL 15 MIN: Performed by: INTERNAL MEDICINE

## 2019-01-29 PROCEDURE — 2580000003 HC RX 258: Performed by: INTERNAL MEDICINE

## 2019-01-29 PROCEDURE — 2720000010 HC SURG SUPPLY STERILE: Performed by: INTERNAL MEDICINE

## 2019-01-29 PROCEDURE — 3609012300 HC EGD BAND LIGATION ESOPHGEAL/GASTRIC VARICES: Performed by: INTERNAL MEDICINE

## 2019-01-29 PROCEDURE — 2709999900 HC NON-CHARGEABLE SUPPLY: Performed by: INTERNAL MEDICINE

## 2019-01-29 PROCEDURE — 2500000003 HC RX 250 WO HCPCS: Performed by: NURSE ANESTHETIST, CERTIFIED REGISTERED

## 2019-01-29 PROCEDURE — 3700000000 HC ANESTHESIA ATTENDED CARE: Performed by: INTERNAL MEDICINE

## 2019-01-29 PROCEDURE — 7100000000 HC PACU RECOVERY - FIRST 15 MIN: Performed by: INTERNAL MEDICINE

## 2019-01-29 PROCEDURE — 3700000001 HC ADD 15 MINUTES (ANESTHESIA): Performed by: INTERNAL MEDICINE

## 2019-01-29 RX ORDER — LIDOCAINE HYDROCHLORIDE 20 MG/ML
INJECTION, SOLUTION INFILTRATION; PERINEURAL PRN
Status: DISCONTINUED | OUTPATIENT
Start: 2019-01-29 | End: 2019-01-29 | Stop reason: SDUPTHER

## 2019-01-29 RX ORDER — SODIUM CHLORIDE 450 MG/100ML
INJECTION, SOLUTION INTRAVENOUS CONTINUOUS
Status: DISCONTINUED | OUTPATIENT
Start: 2019-01-29 | End: 2019-01-29 | Stop reason: HOSPADM

## 2019-01-29 RX ORDER — PANTOPRAZOLE SODIUM 40 MG/1
40 TABLET, DELAYED RELEASE ORAL 2 TIMES DAILY WITH MEALS
COMMUNITY

## 2019-01-29 RX ORDER — PROPOFOL 10 MG/ML
INJECTION, EMULSION INTRAVENOUS PRN
Status: DISCONTINUED | OUTPATIENT
Start: 2019-01-29 | End: 2019-01-29 | Stop reason: SDUPTHER

## 2019-01-29 RX ADMIN — PROPOFOL 200 MG: 10 INJECTION, EMULSION INTRAVENOUS at 13:09

## 2019-01-29 RX ADMIN — LIDOCAINE HYDROCHLORIDE 100 MG: 20 INJECTION, SOLUTION INFILTRATION; PERINEURAL at 13:09

## 2019-01-29 RX ADMIN — SODIUM CHLORIDE: 4.5 INJECTION, SOLUTION INTRAVENOUS at 12:01

## 2019-01-29 ASSESSMENT — PAIN SCALES - GENERAL: PAINLEVEL_OUTOF10: 0

## 2019-01-29 ASSESSMENT — PAIN - FUNCTIONAL ASSESSMENT: PAIN_FUNCTIONAL_ASSESSMENT: 0-10

## 2019-01-30 ENCOUNTER — APPOINTMENT (OUTPATIENT)
Dept: GENERAL RADIOLOGY | Age: 39
DRG: 392 | End: 2019-01-30
Payer: COMMERCIAL

## 2019-01-30 ENCOUNTER — APPOINTMENT (OUTPATIENT)
Dept: CT IMAGING | Age: 39
DRG: 392 | End: 2019-01-30
Payer: COMMERCIAL

## 2019-01-30 ENCOUNTER — HOSPITAL ENCOUNTER (INPATIENT)
Age: 39
LOS: 1 days | Discharge: NURSING, REHAB OR LONG-TERM CARE | DRG: 392 | End: 2019-01-30
Attending: EMERGENCY MEDICINE | Admitting: INTERNAL MEDICINE
Payer: COMMERCIAL

## 2019-01-30 VITALS
DIASTOLIC BLOOD PRESSURE: 64 MMHG | TEMPERATURE: 97.9 F | WEIGHT: 154.9 LBS | HEART RATE: 86 BPM | SYSTOLIC BLOOD PRESSURE: 102 MMHG | HEIGHT: 65 IN | RESPIRATION RATE: 16 BRPM | OXYGEN SATURATION: 99 % | BODY MASS INDEX: 25.81 KG/M2

## 2019-01-30 DIAGNOSIS — G89.18 POSTOPERATIVE PAIN: Primary | ICD-10-CM

## 2019-01-30 DIAGNOSIS — R10.13 EPIGASTRIC PAIN: ICD-10-CM

## 2019-01-30 PROBLEM — E83.51 HYPOCALCEMIA: Status: ACTIVE | Noted: 2019-01-30

## 2019-01-30 PROBLEM — R10.9 ABDOMINAL PAIN: Status: ACTIVE | Noted: 2019-01-30

## 2019-01-30 PROBLEM — R74.01 ELEVATED TRANSAMINASE LEVEL: Status: ACTIVE | Noted: 2019-01-30

## 2019-01-30 PROBLEM — D64.9 ANEMIA: Status: ACTIVE | Noted: 2019-01-30

## 2019-01-30 PROBLEM — R79.1 ELEVATED INR: Status: ACTIVE | Noted: 2019-01-30

## 2019-01-30 LAB
ALBUMIN SERPL-MCNC: 2.3 G/DL (ref 3.5–5.1)
ALP BLD-CCNC: 133 U/L (ref 38–126)
ALT SERPL-CCNC: 34 U/L (ref 11–66)
AMMONIA: 23 UMOL/L (ref 11–60)
ANION GAP SERPL CALCULATED.3IONS-SCNC: 12 MEQ/L (ref 8–16)
ANISOCYTOSIS: PRESENT
AST SERPL-CCNC: 77 U/L (ref 5–40)
BASOPHILS # BLD: 0.3 %
BASOPHILS ABSOLUTE: 0 THOU/MM3 (ref 0–0.1)
BILIRUB SERPL-MCNC: 8.7 MG/DL (ref 0.3–1.2)
BILIRUBIN DIRECT: 5.1 MG/DL (ref 0–0.3)
BUN BLDV-MCNC: 7 MG/DL (ref 7–22)
CALCIUM SERPL-MCNC: 7.9 MG/DL (ref 8.5–10.5)
CHLORIDE BLD-SCNC: 102 MEQ/L (ref 98–111)
CO2: 24 MEQ/L (ref 23–33)
CREAT SERPL-MCNC: 0.2 MG/DL (ref 0.4–1.2)
EKG ATRIAL RATE: 65 BPM
EKG P AXIS: 22 DEGREES
EKG P-R INTERVAL: 132 MS
EKG Q-T INTERVAL: 430 MS
EKG QRS DURATION: 70 MS
EKG QTC CALCULATION (BAZETT): 447 MS
EKG R AXIS: 11 DEGREES
EKG T AXIS: 11 DEGREES
EKG VENTRICULAR RATE: 65 BPM
EOSINOPHIL # BLD: 1.2 %
EOSINOPHILS ABSOLUTE: 0.1 THOU/MM3 (ref 0–0.4)
ERYTHROCYTE [DISTWIDTH] IN BLOOD BY AUTOMATED COUNT: 17.1 % (ref 11.5–14.5)
ERYTHROCYTE [DISTWIDTH] IN BLOOD BY AUTOMATED COUNT: 66.4 FL (ref 35–45)
GFR SERPL CREATININE-BSD FRML MDRD: > 90 ML/MIN/1.73M2
GLUCOSE BLD-MCNC: 87 MG/DL (ref 70–108)
HCT VFR BLD CALC: 32.4 % (ref 37–47)
HEMOGLOBIN: 10.1 GM/DL (ref 12–16)
IMMATURE GRANS (ABS): 0.04 THOU/MM3 (ref 0–0.07)
IMMATURE GRANULOCYTES: 0.4 %
INR BLD: 2.31 (ref 0.85–1.13)
LIPASE: 24.5 U/L (ref 5.6–51.3)
LYMPHOCYTES # BLD: 16.4 %
LYMPHOCYTES ABSOLUTE: 1.6 THOU/MM3 (ref 1–4.8)
MAGNESIUM: 2 MG/DL (ref 1.6–2.4)
MCH RBC QN AUTO: 34 PG (ref 26–33)
MCHC RBC AUTO-ENTMCNC: 31.2 GM/DL (ref 32.2–35.5)
MCV RBC AUTO: 109.1 FL (ref 81–99)
MONOCYTES # BLD: 7.9 %
MONOCYTES ABSOLUTE: 0.8 THOU/MM3 (ref 0.4–1.3)
NUCLEATED RED BLOOD CELLS: 0 /100 WBC
OSMOLALITY CALCULATION: 273 MOSMOL/KG (ref 275–300)
PLATELET # BLD: 162 THOU/MM3 (ref 130–400)
PMV BLD AUTO: 9.8 FL (ref 9.4–12.4)
POTASSIUM REFLEX MAGNESIUM: 3.1 MEQ/L (ref 3.5–5.2)
RBC # BLD: 2.97 MILL/MM3 (ref 4.2–5.4)
SEG NEUTROPHILS: 73.8 %
SEGMENTED NEUTROPHILS ABSOLUTE COUNT: 7.2 THOU/MM3 (ref 1.8–7.7)
SODIUM BLD-SCNC: 138 MEQ/L (ref 135–145)
TOTAL PROTEIN: 5.4 G/DL (ref 6.1–8)
TROPONIN T: < 0.01 NG/ML
TROPONIN T: < 0.01 NG/ML
WBC # BLD: 9.8 THOU/MM3 (ref 4.8–10.8)

## 2019-01-30 PROCEDURE — 2580000003 HC RX 258: Performed by: EMERGENCY MEDICINE

## 2019-01-30 PROCEDURE — 82140 ASSAY OF AMMONIA: CPT

## 2019-01-30 PROCEDURE — 80048 BASIC METABOLIC PNL TOTAL CA: CPT

## 2019-01-30 PROCEDURE — 83735 ASSAY OF MAGNESIUM: CPT

## 2019-01-30 PROCEDURE — 93010 ELECTROCARDIOGRAM REPORT: CPT | Performed by: INTERNAL MEDICINE

## 2019-01-30 PROCEDURE — 71260 CT THORAX DX C+: CPT

## 2019-01-30 PROCEDURE — 85610 PROTHROMBIN TIME: CPT

## 2019-01-30 PROCEDURE — 99223 1ST HOSP IP/OBS HIGH 75: CPT | Performed by: INTERNAL MEDICINE

## 2019-01-30 PROCEDURE — 6370000000 HC RX 637 (ALT 250 FOR IP): Performed by: EMERGENCY MEDICINE

## 2019-01-30 PROCEDURE — 6370000000 HC RX 637 (ALT 250 FOR IP): Performed by: INTERNAL MEDICINE

## 2019-01-30 PROCEDURE — 0W3P8ZZ CONTROL BLEEDING IN GASTROINTESTINAL TRACT, VIA NATURAL OR ARTIFICIAL OPENING ENDOSCOPIC: ICD-10-PCS | Performed by: INTERNAL MEDICINE

## 2019-01-30 PROCEDURE — 85025 COMPLETE CBC W/AUTO DIFF WBC: CPT

## 2019-01-30 PROCEDURE — 36415 COLL VENOUS BLD VENIPUNCTURE: CPT

## 2019-01-30 PROCEDURE — 99285 EMERGENCY DEPT VISIT HI MDM: CPT

## 2019-01-30 PROCEDURE — C9113 INJ PANTOPRAZOLE SODIUM, VIA: HCPCS | Performed by: INTERNAL MEDICINE

## 2019-01-30 PROCEDURE — 6360000002 HC RX W HCPCS: Performed by: EMERGENCY MEDICINE

## 2019-01-30 PROCEDURE — 71046 X-RAY EXAM CHEST 2 VIEWS: CPT

## 2019-01-30 PROCEDURE — 1200000003 HC TELEMETRY R&B

## 2019-01-30 PROCEDURE — 80076 HEPATIC FUNCTION PANEL: CPT

## 2019-01-30 PROCEDURE — C9113 INJ PANTOPRAZOLE SODIUM, VIA: HCPCS | Performed by: EMERGENCY MEDICINE

## 2019-01-30 PROCEDURE — 6360000004 HC RX CONTRAST MEDICATION: Performed by: EMERGENCY MEDICINE

## 2019-01-30 PROCEDURE — 93005 ELECTROCARDIOGRAM TRACING: CPT | Performed by: EMERGENCY MEDICINE

## 2019-01-30 PROCEDURE — 99238 HOSP IP/OBS DSCHRG MGMT 30/<: CPT | Performed by: INTERNAL MEDICINE

## 2019-01-30 PROCEDURE — 2709999900 HC NON-CHARGEABLE SUPPLY

## 2019-01-30 PROCEDURE — 84484 ASSAY OF TROPONIN QUANT: CPT

## 2019-01-30 PROCEDURE — 6360000002 HC RX W HCPCS: Performed by: INTERNAL MEDICINE

## 2019-01-30 PROCEDURE — 96375 TX/PRO/DX INJ NEW DRUG ADDON: CPT

## 2019-01-30 PROCEDURE — 83690 ASSAY OF LIPASE: CPT

## 2019-01-30 PROCEDURE — 96374 THER/PROPH/DIAG INJ IV PUSH: CPT

## 2019-01-30 RX ORDER — SODIUM CHLORIDE 9 MG/ML
INJECTION, SOLUTION INTRAVENOUS CONTINUOUS
Status: DISCONTINUED | OUTPATIENT
Start: 2019-01-30 | End: 2019-01-30 | Stop reason: HOSPADM

## 2019-01-30 RX ORDER — MULTIVITAMIN WITH FOLIC ACID 400 MCG
1 TABLET ORAL DAILY
Status: DISCONTINUED | OUTPATIENT
Start: 2019-01-30 | End: 2019-01-30 | Stop reason: HOSPADM

## 2019-01-30 RX ORDER — PANTOPRAZOLE SODIUM 40 MG/10ML
40 INJECTION, POWDER, LYOPHILIZED, FOR SOLUTION INTRAVENOUS ONCE
Status: COMPLETED | OUTPATIENT
Start: 2019-01-30 | End: 2019-01-30

## 2019-01-30 RX ORDER — MIDODRINE HYDROCHLORIDE 10 MG/1
10 TABLET ORAL
Status: DISCONTINUED | OUTPATIENT
Start: 2019-01-30 | End: 2019-01-30 | Stop reason: HOSPADM

## 2019-01-30 RX ORDER — NADOLOL 40 MG/1
20 TABLET ORAL DAILY
Status: DISCONTINUED | OUTPATIENT
Start: 2019-01-30 | End: 2019-01-30 | Stop reason: HOSPADM

## 2019-01-30 RX ORDER — ONDANSETRON 2 MG/ML
4 INJECTION INTRAMUSCULAR; INTRAVENOUS EVERY 6 HOURS PRN
Status: DISCONTINUED | OUTPATIENT
Start: 2019-01-30 | End: 2019-01-30 | Stop reason: HOSPADM

## 2019-01-30 RX ORDER — ONDANSETRON 2 MG/ML
4 INJECTION INTRAMUSCULAR; INTRAVENOUS EVERY 30 MIN PRN
Status: DISCONTINUED | OUTPATIENT
Start: 2019-01-30 | End: 2019-01-30 | Stop reason: DRUGHIGH

## 2019-01-30 RX ORDER — ACETAMINOPHEN 325 MG/1
325 TABLET ORAL EVERY 4 HOURS PRN
Status: CANCELLED | OUTPATIENT
Start: 2019-01-30

## 2019-01-30 RX ORDER — FLUDROCORTISONE ACETATE 0.1 MG/1
0.1 TABLET ORAL DAILY
Status: DISCONTINUED | OUTPATIENT
Start: 2019-01-30 | End: 2019-01-30 | Stop reason: HOSPADM

## 2019-01-30 RX ORDER — PANTOPRAZOLE SODIUM 40 MG/1
40 TABLET, DELAYED RELEASE ORAL 2 TIMES DAILY WITH MEALS
Status: DISCONTINUED | OUTPATIENT
Start: 2019-01-30 | End: 2019-01-30

## 2019-01-30 RX ORDER — FENTANYL CITRATE 50 UG/ML
50 INJECTION, SOLUTION INTRAMUSCULAR; INTRAVENOUS
Status: DISCONTINUED | OUTPATIENT
Start: 2019-01-30 | End: 2019-01-30 | Stop reason: HOSPADM

## 2019-01-30 RX ORDER — THIAMINE MONONITRATE (VIT B1) 100 MG
100 TABLET ORAL DAILY
Status: DISCONTINUED | OUTPATIENT
Start: 2019-01-30 | End: 2019-01-30 | Stop reason: HOSPADM

## 2019-01-30 RX ORDER — ACETAMINOPHEN 160 MG/5ML
320 SUSPENSION, ORAL (FINAL DOSE FORM) ORAL EVERY 4 HOURS PRN
COMMUNITY

## 2019-01-30 RX ORDER — SODIUM CHLORIDE 1000 MG
1 TABLET, SOLUBLE MISCELLANEOUS 2 TIMES DAILY WITH MEALS
Status: DISCONTINUED | OUTPATIENT
Start: 2019-01-30 | End: 2019-01-30 | Stop reason: HOSPADM

## 2019-01-30 RX ORDER — SODIUM CHLORIDE 0.9 % (FLUSH) 0.9 %
10 SYRINGE (ML) INJECTION EVERY 12 HOURS SCHEDULED
Status: DISCONTINUED | OUTPATIENT
Start: 2019-01-30 | End: 2019-01-30 | Stop reason: HOSPADM

## 2019-01-30 RX ORDER — PREDNISOLONE SODIUM PHOSPHATE 15 MG/5ML
40 SOLUTION ORAL DAILY
Status: DISCONTINUED | OUTPATIENT
Start: 2019-01-30 | End: 2019-01-30 | Stop reason: HOSPADM

## 2019-01-30 RX ORDER — 0.9 % SODIUM CHLORIDE 0.9 %
1000 INTRAVENOUS SOLUTION INTRAVENOUS ONCE
Status: COMPLETED | OUTPATIENT
Start: 2019-01-30 | End: 2019-01-30

## 2019-01-30 RX ORDER — ONDANSETRON 4 MG/1
4 TABLET, ORALLY DISINTEGRATING ORAL EVERY 12 HOURS PRN
COMMUNITY
End: 2019-05-11 | Stop reason: SDUPTHER

## 2019-01-30 RX ORDER — LACTULOSE 10 G/15ML
20 SOLUTION ORAL 2 TIMES DAILY
Status: DISCONTINUED | OUTPATIENT
Start: 2019-01-30 | End: 2019-01-30 | Stop reason: HOSPADM

## 2019-01-30 RX ORDER — LEVOTHYROXINE SODIUM 88 UG/1
88 TABLET ORAL DAILY
Status: DISCONTINUED | OUTPATIENT
Start: 2019-01-30 | End: 2019-01-30 | Stop reason: HOSPADM

## 2019-01-30 RX ORDER — PANTOPRAZOLE SODIUM 40 MG/10ML
40 INJECTION, POWDER, LYOPHILIZED, FOR SOLUTION INTRAVENOUS 2 TIMES DAILY
Status: DISCONTINUED | OUTPATIENT
Start: 2019-01-30 | End: 2019-01-30 | Stop reason: HOSPADM

## 2019-01-30 RX ORDER — FOLIC ACID 1 MG/1
1 TABLET ORAL DAILY
Status: DISCONTINUED | OUTPATIENT
Start: 2019-01-30 | End: 2019-01-30 | Stop reason: HOSPADM

## 2019-01-30 RX ORDER — CYANOCOBALAMIN 1000 UG/ML
1000 INJECTION INTRAMUSCULAR; SUBCUTANEOUS
Status: DISCONTINUED | OUTPATIENT
Start: 2019-02-15 | End: 2019-01-30 | Stop reason: HOSPADM

## 2019-01-30 RX ORDER — SODIUM CHLORIDE 0.9 % (FLUSH) 0.9 %
10 SYRINGE (ML) INJECTION PRN
Status: DISCONTINUED | OUTPATIENT
Start: 2019-01-30 | End: 2019-01-30 | Stop reason: HOSPADM

## 2019-01-30 RX ORDER — POTASSIUM CHLORIDE 750 MG/1
40 TABLET, FILM COATED, EXTENDED RELEASE ORAL ONCE
Status: COMPLETED | OUTPATIENT
Start: 2019-01-30 | End: 2019-01-30

## 2019-01-30 RX ADMIN — LACTULOSE 20 G: 20 SOLUTION ORAL at 08:28

## 2019-01-30 RX ADMIN — THERA TABS 1 TABLET: TAB at 09:26

## 2019-01-30 RX ADMIN — RIFAXIMIN 550 MG: 550 TABLET ORAL at 09:26

## 2019-01-30 RX ADMIN — FENTANYL CITRATE 50 MCG: 50 INJECTION, SOLUTION INTRAMUSCULAR; INTRAVENOUS at 01:36

## 2019-01-30 RX ADMIN — SODIUM CHLORIDE 1000 ML: 9 INJECTION, SOLUTION INTRAVENOUS at 01:43

## 2019-01-30 RX ADMIN — ONDANSETRON 4 MG: 2 INJECTION INTRAMUSCULAR; INTRAVENOUS at 01:36

## 2019-01-30 RX ADMIN — PANTOPRAZOLE SODIUM 40 MG: 40 INJECTION, POWDER, FOR SOLUTION INTRAVENOUS at 09:26

## 2019-01-30 RX ADMIN — LEVOTHYROXINE SODIUM 88 MCG: 88 TABLET ORAL at 09:26

## 2019-01-30 RX ADMIN — ONDANSETRON 4 MG: 2 INJECTION INTRAMUSCULAR; INTRAVENOUS at 08:29

## 2019-01-30 RX ADMIN — HYDROMORPHONE HYDROCHLORIDE 0.5 MG: 1 INJECTION, SOLUTION INTRAMUSCULAR; INTRAVENOUS; SUBCUTANEOUS at 08:29

## 2019-01-30 RX ADMIN — Medication 100 MG: at 09:26

## 2019-01-30 RX ADMIN — IOPAMIDOL 80 ML: 755 INJECTION, SOLUTION INTRAVENOUS at 04:35

## 2019-01-30 RX ADMIN — MIDODRINE HYDROCHLORIDE 10 MG: 10 TABLET ORAL at 09:26

## 2019-01-30 RX ADMIN — Medication 40 MG: at 09:23

## 2019-01-30 RX ADMIN — POTASSIUM CHLORIDE 40 MEQ: 750 TABLET, EXTENDED RELEASE ORAL at 03:01

## 2019-01-30 RX ADMIN — SODIUM CHLORIDE: 9 INJECTION, SOLUTION INTRAVENOUS at 04:55

## 2019-01-30 RX ADMIN — FLUDROCORTISONE ACETATE 0.1 MG: 0.1 TABLET ORAL at 09:26

## 2019-01-30 RX ADMIN — NADOLOL 20 MG: 40 TABLET ORAL at 09:25

## 2019-01-30 RX ADMIN — SODIUM CHLORIDE TAB 1 GM 1 G: 1 TAB at 09:26

## 2019-01-30 RX ADMIN — PANTOPRAZOLE SODIUM 40 MG: 40 INJECTION, POWDER, FOR SOLUTION INTRAVENOUS at 01:36

## 2019-01-30 RX ADMIN — VITAMIN D, TAB 1000IU (100/BT) 500 UNITS: 25 TAB at 09:25

## 2019-01-30 RX ADMIN — MIDODRINE HYDROCHLORIDE 10 MG: 10 TABLET ORAL at 11:52

## 2019-01-30 RX ADMIN — FOLIC ACID 1 MG: 1 TABLET ORAL at 09:26

## 2019-01-30 ASSESSMENT — PAIN DESCRIPTION - ONSET
ONSET: ON-GOING
ONSET: PROGRESSIVE

## 2019-01-30 ASSESSMENT — PAIN DESCRIPTION - DESCRIPTORS
DESCRIPTORS: DISCOMFORT;SORE
DESCRIPTORS: SHARP;STABBING

## 2019-01-30 ASSESSMENT — PAIN DESCRIPTION - PAIN TYPE
TYPE: ACUTE PAIN

## 2019-01-30 ASSESSMENT — ENCOUNTER SYMPTOMS
VOMITING: 0
COUGH: 0
BLOOD IN STOOL: 0
WHEEZING: 0
SORE THROAT: 1
BACK PAIN: 0
ABDOMINAL PAIN: 1
NAUSEA: 0
SHORTNESS OF BREATH: 0
DIARRHEA: 0

## 2019-01-30 ASSESSMENT — PAIN SCALES - GENERAL
PAINLEVEL_OUTOF10: 10
PAINLEVEL_OUTOF10: 5
PAINLEVEL_OUTOF10: 2
PAINLEVEL_OUTOF10: 8
PAINLEVEL_OUTOF10: 4

## 2019-01-30 ASSESSMENT — PAIN DESCRIPTION - LOCATION
LOCATION: ABDOMEN;THROAT
LOCATION: ABDOMEN
LOCATION: THROAT;CHEST;ABDOMEN
LOCATION: ABDOMEN;CHEST;THROAT

## 2019-01-30 ASSESSMENT — PAIN DESCRIPTION - FREQUENCY
FREQUENCY: CONTINUOUS

## 2019-01-30 ASSESSMENT — PAIN DESCRIPTION - PROGRESSION: CLINICAL_PROGRESSION: NOT CHANGED

## 2019-01-30 ASSESSMENT — PAIN - FUNCTIONAL ASSESSMENT: PAIN_FUNCTIONAL_ASSESSMENT: PREVENTS OR INTERFERES SOME ACTIVE ACTIVITIES AND ADLS

## 2019-01-30 ASSESSMENT — PAIN DESCRIPTION - ORIENTATION: ORIENTATION: MID

## 2019-01-30 NOTE — ED NOTES
Patient reporting that her abdomen feels sore, but states that she thinks that is from \"tensing up\" from pain earlier. Patient also reports discomfort when swallowing pills, which resolves after administration. Patient is resting in bed watching television, no further complaints voiced.       Marcela Bray RN  01/30/19 [id number]
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
## 2019-01-30 NOTE — CARE COORDINATION
1/30/19, 10:01 AM    DISCHARGE BARRIERS    Pt is a readmit, came from [address] [place] [address]. JOVITA met with pt, confirmed that pt will return to [place] to complete rehab. JOVITA spoke with Mckenna at [facility], they can accept pt. JOVITA advised Mckenna on possible discharge today. Await physician to round.
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
## 2019-01-30 NOTE — ED NOTES
Contacted nursing staff at [facility] and notified them of patient's admission status.      Damien Arteaga RN  01/30/19 7827

## 2019-01-30 NOTE — ED NOTES
Bed: 003A  Expected date:   Expected time:   Means of arrival: Vandana  Comments:     Kyler Carter RN  01/30/19 0110

## 2019-01-30 NOTE — ED NOTES
Patient off the floor to CT and then will travel to the floor.        Yanira Fortune RN  01/30/19 9634

## 2019-01-30 NOTE — PROGRESS NOTES
Patient given \"blue packet\" for ECF discharge. AVS and last dose MAR given to family and faxed to the ECF.  Patient left with  to ECF in stable condition

## 2019-01-30 NOTE — CARE COORDINATION
1/30/19, 10:31 AM    Discharge plan discussed by  and . Discharge plan reviewed with patient/ family. Patient/ family verbalize understanding of discharge plan and are in agreement with plan. Understanding was demonstrated using the teach back method. Services After Discharge  Services At/After Discharge: [address] [address], Skilled Therapy, Nursing Services ([facility])   IMM Letter  IMM Letter given to Patient/Family/Significant other/Guardian/POA/by[de-identified] staff  IMM Letter date given[de-identified] 01/30/19     Discussed anticipated discharge today during morning huddle. Marylen Savage with [facility] is aware. Blue envelope on chart with phone and fax numbers for RN. Pt stated spouse would be able to transport after work today. Pt will return [address] under her Medicare. DENG Tan aware. Right femoral shaft fracture  10/18/2017    Active  Artur Francois

## 2019-01-30 NOTE — PROGRESS NOTES
Nutrition Note      Nursing Nutrition screen completed per nutrition standards of care. Pt. Does not meet criteria for positive nutrition screen at this time. The patient will be re-screened per nutrition care process policy. Please consult dietitian if intervention is needed before that time.      Electronically signed by Lou Stafford RD, BHARAT on 1/30/19 at 9:14 AM

## 2019-01-30 NOTE — ED NOTES
Patient voices relief from fentanyl administration, states that she feels like she finally has some relief. Will continue to monitor.      Gilberto Moon RN  01/30/19 3032

## 2019-01-30 NOTE — ED TRIAGE NOTES
Patient presents to the ED for complaint of abdominal pain following an upper GI procedure here at [facility] earlier in the day. Patient reports that she was advised to expect to be in some post-procedure pain, but states \"I didn't think it was going to be this bad. \"  Patient reporting pain from her throat to her abdomen. Patient states that she was recently discharged from the hospital and is currently at a nursing facility for rehab. Patient elaborates on procedure and states that the purpose was to do some deep banding for varices that are present following a suicide attempt in 2010. Patient reports that she injected 9 cc of rat poison into her system and effectively \"killed\" her liver. Dr. Luba Higginbotham at bedside to assess.
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
## 2019-01-30 NOTE — PROGRESS NOTES
progress        Patient:  Christiano Boateng  YOB: 1980    MRN: [id number]     Acct: [de-identified]    PCP: ALANIS Cabello CNP    Date of Admission: 1/30/2019    Date of Service: Pt seen/examined on 1/30/2019   and Admitted to Inpatient with expected LOS greater than two midnights due to medical therapy. Chief Complaint:   Chief Complaint   Patient presents with    Abdominal Pain           History Of Present Illness:      41 y.o. female who presented to [facility] with complaints of weakness, fatigue and abdominal pain. Patient is historian and deemed to be reliable. Patient has history of etoh abuse, cirrhosis, and esophageal varices. Patient currently living in [address] [place] [address], for rehab after recent hospitalization. Patient reports blood in her stools. She had endoscopy yesterday for variceal banding. She came to ED for further evaluation of abdominal pain She states her pain today following procedure was 12/10 scale. She notes that she has not been able to eat or drink anything due to pain. She reports that she can feel liquid and pills that she swallows \"all the way down her esophagus and into her back. \" She notes her pain currently is 8/10 scale. Fentanyl gave her some pain relief. Patient reports strong history depression and anxiety. She shares that in 2010 she ingested rat poison as a suicide attempt and this is the primary cause of her liver disease. Patient was evaluated in the ED. Labs and imaging obtained. Patient to be admitted for abdominal pain    Subjective-  Feels much better  Wanting to go back to nursing home  Radiology reviewed   seen by LUPE rust for discharge  Diet: Clear liquid, 1500 mL fluid restriction, sodium restriction  DIET CLEAR LIQUID; Low Sodium (2 GM); Daily Fluid Restriction: 1500 ml    REVIEW OF SYSTEMS:   Pertinent positives as noted in the HPI. All other systems reviewed and negative.     PHYSICAL EXAM:    /64 lidocaine oral solution    Patient will be going back to the nursing home    Discharge time 25 minutes      Thank you ALANIS Flannery CNP for the opportunity to be involved in this patient's care.     Electronically signed by Stevie Bass MD on 1/30/2019 at 4:08 PM

## 2019-01-30 NOTE — PLAN OF CARE
Problem: DISCHARGE BARRIERS  Goal: Patient's continuum of care needs are met  Outcome: Ongoing  Return to [facility]

## 2019-01-30 NOTE — DISCHARGE SUMMARY
progress        Patient:  Kaveh Sotelo  YOB: 1980    MRN: [id number]     Acct: [de-identified]    PCP: ALANIS Roberto CNP    Date of Admission: 1/30/2019    Date of Service: Pt seen/examined on 1/30/2019   and Admitted to Inpatient with expected LOS greater than two midnights due to medical therapy. Chief Complaint:   Chief Complaint   Patient presents with    Abdominal Pain           History Of Present Illness:      41 y.o. female who presented to [address] [place] [address] with complaints of weakness, fatigue and abdominal pain. Patient is historian and deemed to be reliable. Patient has history of etoh abuse, cirrhosis, and esophageal varices. Patient currently living in [address] [place] [address], for rehab after recent hospitalization. Patient reports blood in her stools. She had endoscopy yesterday for variceal banding. She came to ED for further evaluation of abdominal pain She states her pain today following procedure was 12/10 scale. She notes that she has not been able to eat or drink anything due to pain. She reports that she can feel liquid and pills that she swallows \"all the way down her esophagus and into her back. \" She notes her pain currently is 8/10 scale. Fentanyl gave her some pain relief. Patient reports strong history depression and anxiety. She shares that in 2010 she ingested rat poison as a suicide attempt and this is the primary cause of her liver disease. Patient was evaluated in the ED. Labs and imaging obtained. Patient to be admitted for abdominal pain    Subjective-  Feels much better  Wanting to go back to nursing home  Radiology reviewed   seen by LUPE rust for discharge    An After Visit Summary was printed and given to the patient.      Medication List      START taking these medications    gi cocktail  Take 30 mLs by mouth 3 times daily as needed (throat pain)        CHANGE how you take these medications    acetaminophen 160 MG/5ML suspension  Commonly known as:  TYLENOL  What changed:  Another medication with the same name was removed. Continue taking this medication, and follow the directions you see here. CONTINUE taking these medications    fludrocortisone 0.1 MG tablet  Commonly known as:  FLORINEF  Take 1 tablet by mouth daily     folic acid 1 MG tablet  Commonly known as:  FOLVITE  Take 1 tablet by mouth daily     lactulose 10 GM/15ML solution  Commonly known as:  CHRONULAC  Take 30 mLs by mouth 2 times daily Titrate to 2-3 Loose BMs daily     levothyroxine 88 MCG tablet  Commonly known as:  SYNTHROID     midodrine 10 MG tablet  Commonly known as:  PROAMATINE  Take 1 tablet by mouth 3 times daily (with meals)     multivitamin tablet  Take 1 tablet by mouth daily     nadolol 20 MG tablet  Commonly known as:  CORGARD     ondansetron 4 MG disintegrating tablet  Commonly known as:  ZOFRAN-ODT     pantoprazole 40 MG tablet  Commonly known as:  PROTONIX     Pramox-PE-Glycerin-Petrolatum 1-0.25-14.4-15 % Crea rectal cream     prednisoLONE 15 MG/5ML solution  Commonly known as:  ORAPRED  Take 13.3 mLs by mouth daily for 21 days Further tapering dose to be directed as per GI as OP     rifaximin 550 MG tablet  Commonly known as:  XIFAXAN     sodium chloride 1 g tablet  Take 1 tablet by mouth 2 times daily (with meals)     vitamin B-1 100 MG tablet  Commonly known as:  THIAMINE     vitamin D3 1000 units Tabs        STOP taking these medications    cyanocobalamin 1000 MCG/ML injection           Where to Get Your Medications      You can get these medications from any pharmacy    Bring a paper prescription for each of these medications  · gi cocktail       Diet: Clear liquid, 1500 mL fluid restriction, sodium restriction  DIET CLEAR LIQUID; Low Sodium (2 GM); Daily Fluid Restriction: 1500 ml    REVIEW OF SYSTEMS:   Pertinent positives as noted in the HPI. All other systems reviewed and negative.     PHYSICAL EXAM:    /64   Pulse 86 Temp 97.9 °F (36.6 °C) (Oral)   Resp 16   Ht 5' 5\" (1.651 m)   Wt 154 lb 14.4 oz (70.3 kg)   SpO2 99%   BMI 25.78 kg/m²     General appearance:  No apparent distress, appears stated age and cooperative. HEENT:  Normal cephalic, atraumatic without obvious deformity. Pupils equal, round, and reactive to light. Extra ocular muscles intact. Conjunctivae/corneas clear. Neck: Supple, with full range of motion. No jugular venous distention. Trachea midline. Respiratory:  Normal respiratory effort. Clear to auscultation, bilaterally without Rales/Wheezes/Rhonchi. Cardiovascular:  Regular rate and rhythm with normal S1/S2 without murmurs, rubs or gallops. Abdomen: Soft, non-tender, non-distended with normal bowel sounds. Musculoskeletal:  No clubbing, cyanosis or edema bilaterally. Full range of motion without deformity. Skin: Skin color, texture, turgor normal.  No rashes or lesions. Neurologic:  Neurovascularly intact without any focal sensory/motor deficits. Cranial nerves: II-XII intact, grossly non-focal.  Psychiatric:  Alert and oriented, thought content appropriate, normal insight  Capillary Refill: Brisk,< 3 seconds   Peripheral Pulses: +2 palpable, equal bilaterally       Labs:     Recent Labs      01/30/19 0144   WBC  9.8   HGB  10.1*   HCT  32.4*   PLT  162     Recent Labs      01/30/19 0144   NA  138   K  3.1*   CL  102   CO2  24   BUN  7   CREATININE  0.2*   CALCIUM  7.9*     Recent Labs      01/30/19 0144   AST  77*   ALT  34   BILIDIR  5.1*   BILITOT  8.7*   ALKPHOS  133*     Recent Labs      01/30/19 0144   INR  2.31*     No results for input(s): Raheem Roslyncaitlyn in the last 72 hours.     Urinalysis:      Lab Results   Component Value Date    NITRU NEGATIVE 01/18/2019    WBCUA 0-2 01/18/2019    BACTERIA NONE 01/18/2019    RBCUA 0-2 01/18/2019    BLOODU NEGATIVE 01/18/2019    SPECGRAV <1.005 08/24/2016    GLUCOSEU NEGATIVE 01/18/2019       Radiology:         CT CHEST W CONTRAST   Final Result   1. Minimal right base subsegmental atelectasis or scarring. 2. Paraesophageal varices with chronic hepatic changes correlate with cirrhosis. 3. Right subphrenic fluid/ascites of right upper quadrant. **This report has been created using voice recognition software. It may contain minor errors which are inherent in voice recognition technology. **      Final report electronically signed by Dr. Gianfranco Johnson on 1/30/2019 4:51 AM      XR CHEST STANDARD (2 VW)   Final Result   Stable radiographic appearance of the chest. No evidence of an acute process. **This report has been created using voice recognition software. It may contain minor errors which are inherent in voice recognition technology. **      Final report electronically signed by Dr. Gianfranco Johnson on 1/30/2019 2:44 AM               DVT prophylaxis: [] Lovenox                                 [x] SCDs                                 [] SQ Heparin                                 [] Encourage ambulation           [] Already on Anticoagulation    Code Status: Full Code      PT/OT Eval Status: Encouraged    Disposition:    [] Home       [] TCU       [] Rehab       [] Psych       [x] SNF       [] [place]       [] Other-    ASSESSMENT:    Active Hospital Problems    Diagnosis Date Noted    Abdominal pain [R10.9] 01/30/2019    Anemia [D64.9] 01/30/2019    Hypocalcemia [E83.51] 01/30/2019    Elevated transaminase level [R74.0] 01/30/2019    Elevated INR [R79.1] 01/30/2019    Cirrhosis of liver ([facility] [address] [K74.60] 01/14/2019    Hypokalemia [E87.6] 01/14/2019    Essential hypertension [I10] 08/25/2016    Hypothyroidism [E03.9] 08/25/2016    Bloody stool [K92.1]     Anxiety [F41.9] 04/17/2015       PLAN:    Abdominal pain with history of etoh abuse, cirrhosis, and esophageal varices;  Patient had EGD with variceal banding yesterday  No perforation on the CT abdomen  Seen by LUPE rust for discharge on lidocaine oral
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
## 2019-02-04 ENCOUNTER — APPOINTMENT (OUTPATIENT)
Dept: ULTRASOUND IMAGING | Age: 39
DRG: 433 | End: 2019-02-04
Payer: MEDICARE

## 2019-02-04 ENCOUNTER — HOSPITAL ENCOUNTER (INPATIENT)
Age: 39
LOS: 3 days | Discharge: HOME | DRG: 433 | End: 2019-02-08
Attending: HOSPITALIST | Admitting: HOSPITALIST
Payer: MEDICARE

## 2019-02-04 DIAGNOSIS — R10.84 GENERALIZED ABDOMINAL PAIN: ICD-10-CM

## 2019-02-04 DIAGNOSIS — R11.0 NAUSEA: ICD-10-CM

## 2019-02-04 DIAGNOSIS — K70.31 ALCOHOLIC CIRRHOSIS OF LIVER WITH ASCITES (HCC): Primary | ICD-10-CM

## 2019-02-04 DIAGNOSIS — R60.0 LEG EDEMA: ICD-10-CM

## 2019-02-04 DIAGNOSIS — E87.1 CHRONIC HYPONATREMIA: ICD-10-CM

## 2019-02-04 LAB
ALBUMIN SERPL-MCNC: 2.4 G/DL (ref 3.5–5.1)
ALP BLD-CCNC: 135 U/L (ref 38–126)
ALT SERPL-CCNC: 38 U/L (ref 11–66)
AMMONIA: 25 UMOL/L (ref 11–60)
AMPHETAMINE+METHAMPHETAMINE URINE SCREEN: NEGATIVE
ANION GAP SERPL CALCULATED.3IONS-SCNC: 9 MEQ/L (ref 8–16)
APTT: 37.9 SECONDS (ref 22–38)
AST SERPL-CCNC: 88 U/L (ref 5–40)
BACTERIA: ABNORMAL /HPF
BARBITURATE QUANTITATIVE URINE: NEGATIVE
BENZODIAZEPINE QUANTITATIVE URINE: NEGATIVE
BILIRUB SERPL-MCNC: 8.1 MG/DL (ref 0.3–1.2)
BILIRUBIN DIRECT: 4.8 MG/DL (ref 0–0.3)
BILIRUBIN URINE: ABNORMAL
BLOOD, URINE: ABNORMAL
BUN BLDV-MCNC: 7 MG/DL (ref 7–22)
CALCIUM SERPL-MCNC: 7.7 MG/DL (ref 8.5–10.5)
CANNABINOID QUANTITATIVE URINE: NEGATIVE
CASTS 2: ABNORMAL /LPF
CASTS UA: ABNORMAL /LPF
CHARACTER, URINE: ABNORMAL
CHLORIDE BLD-SCNC: 105 MEQ/L (ref 98–111)
CO2: 24 MEQ/L (ref 23–33)
COCAINE METABOLITE QUANTITATIVE URINE: NEGATIVE
COLOR: ABNORMAL
CREAT SERPL-MCNC: 0.3 MG/DL (ref 0.4–1.2)
CRYSTALS, UA: ABNORMAL
EPITHELIAL CELLS, UA: ABNORMAL /HPF
ETHYL ALCOHOL, SERUM: < 0.01 %
GFR SERPL CREATININE-BSD FRML MDRD: > 90 ML/MIN/1.73M2
GLUCOSE BLD-MCNC: 105 MG/DL (ref 70–108)
GLUCOSE URINE: ABNORMAL MG/DL
ICTOTEST: POSITIVE
INR BLD: 2.32 (ref 0.85–1.13)
KETONES, URINE: ABNORMAL
LEUKOCYTE ESTERASE, URINE: ABNORMAL
MAGNESIUM: 1.9 MG/DL (ref 1.6–2.4)
MISCELLANEOUS 2: ABNORMAL
NITRITE, URINE: ABNORMAL
OPIATES, URINE: NEGATIVE
OSMOLALITY CALCULATION: 274 MOSMOL/KG (ref 275–300)
OXYCODONE: NEGATIVE
PH UA: ABNORMAL
PHENCYCLIDINE QUANTITATIVE URINE: NEGATIVE
POTASSIUM SERPL-SCNC: 4.2 MEQ/L (ref 3.5–5.2)
PREGNANCY, SERUM: NEGATIVE
PRO-BNP: 294.6 PG/ML (ref 0–450)
PROTEIN UA: ABNORMAL
RBC URINE: ABNORMAL /HPF
RENAL EPITHELIAL, UA: ABNORMAL
SCAN OF BLOOD SMEAR: NORMAL
SODIUM BLD-SCNC: 138 MEQ/L (ref 135–145)
SPECIFIC GRAVITY, URINE: ABNORMAL (ref 1–1.03)
TOTAL PROTEIN: 5 G/DL (ref 6.1–8)
UROBILINOGEN, URINE: ABNORMAL EU/DL
WBC UA: ABNORMAL /HPF
YEAST: ABNORMAL

## 2019-02-04 PROCEDURE — 6360000002 HC RX W HCPCS: Performed by: STUDENT IN AN ORGANIZED HEALTH CARE EDUCATION/TRAINING PROGRAM

## 2019-02-04 PROCEDURE — 80307 DRUG TEST PRSMV CHEM ANLYZR: CPT

## 2019-02-04 PROCEDURE — G0480 DRUG TEST DEF 1-7 CLASSES: HCPCS

## 2019-02-04 PROCEDURE — 99285 EMERGENCY DEPT VISIT HI MDM: CPT

## 2019-02-04 PROCEDURE — 2580000003 HC RX 258: Performed by: STUDENT IN AN ORGANIZED HEALTH CARE EDUCATION/TRAINING PROGRAM

## 2019-02-04 PROCEDURE — 82248 BILIRUBIN DIRECT: CPT

## 2019-02-04 PROCEDURE — 81001 URINALYSIS AUTO W/SCOPE: CPT

## 2019-02-04 PROCEDURE — 84703 CHORIONIC GONADOTROPIN ASSAY: CPT

## 2019-02-04 PROCEDURE — 80053 COMPREHEN METABOLIC PANEL: CPT

## 2019-02-04 PROCEDURE — 83880 ASSAY OF NATRIURETIC PEPTIDE: CPT

## 2019-02-04 PROCEDURE — 82140 ASSAY OF AMMONIA: CPT

## 2019-02-04 PROCEDURE — 96374 THER/PROPH/DIAG INJ IV PUSH: CPT

## 2019-02-04 PROCEDURE — 85730 THROMBOPLASTIN TIME PARTIAL: CPT

## 2019-02-04 PROCEDURE — 36415 COLL VENOUS BLD VENIPUNCTURE: CPT

## 2019-02-04 PROCEDURE — 83735 ASSAY OF MAGNESIUM: CPT

## 2019-02-04 PROCEDURE — 87077 CULTURE AEROBIC IDENTIFY: CPT

## 2019-02-04 PROCEDURE — 76705 ECHO EXAM OF ABDOMEN: CPT

## 2019-02-04 PROCEDURE — 85610 PROTHROMBIN TIME: CPT

## 2019-02-04 PROCEDURE — 2709999900 HC NON-CHARGEABLE SUPPLY

## 2019-02-04 PROCEDURE — 85025 COMPLETE CBC W/AUTO DIFF WBC: CPT

## 2019-02-04 PROCEDURE — 87186 SC STD MICRODIL/AGAR DIL: CPT

## 2019-02-04 PROCEDURE — 87184 SC STD DISK METHOD PER PLATE: CPT

## 2019-02-04 PROCEDURE — 87086 URINE CULTURE/COLONY COUNT: CPT

## 2019-02-04 RX ORDER — 0.9 % SODIUM CHLORIDE 0.9 %
1000 INTRAVENOUS SOLUTION INTRAVENOUS ONCE
Status: COMPLETED | OUTPATIENT
Start: 2019-02-04 | End: 2019-02-04

## 2019-02-04 RX ORDER — KETOROLAC TROMETHAMINE 30 MG/ML
30 INJECTION, SOLUTION INTRAMUSCULAR; INTRAVENOUS ONCE
Status: COMPLETED | OUTPATIENT
Start: 2019-02-04 | End: 2019-02-04

## 2019-02-04 RX ADMIN — KETOROLAC TROMETHAMINE 30 MG: 30 INJECTION, SOLUTION INTRAMUSCULAR at 20:26

## 2019-02-04 RX ADMIN — SODIUM CHLORIDE 1000 ML: 9 INJECTION, SOLUTION INTRAVENOUS at 20:26

## 2019-02-04 ASSESSMENT — ENCOUNTER SYMPTOMS
NAUSEA: 0
WHEEZING: 0
ABDOMINAL PAIN: 0
EYE PAIN: 0
VOMITING: 0
EYE DISCHARGE: 0
BACK PAIN: 0
SORE THROAT: 0
RHINORRHEA: 0
COUGH: 0
DIARRHEA: 0
SHORTNESS OF BREATH: 0

## 2019-02-04 ASSESSMENT — PAIN SCALES - GENERAL
PAINLEVEL_OUTOF10: 8
PAINLEVEL_OUTOF10: 10
PAINLEVEL_OUTOF10: 10

## 2019-02-04 ASSESSMENT — PAIN DESCRIPTION - DESCRIPTORS: DESCRIPTORS: ACHING

## 2019-02-04 ASSESSMENT — PAIN DESCRIPTION - ORIENTATION
ORIENTATION: RIGHT;LEFT
ORIENTATION: RIGHT;LEFT

## 2019-02-04 ASSESSMENT — PAIN DESCRIPTION - PAIN TYPE
TYPE: ACUTE PAIN
TYPE: ACUTE PAIN

## 2019-02-04 ASSESSMENT — PAIN DESCRIPTION - LOCATION
LOCATION: ABDOMEN;LEG
LOCATION: ABDOMEN

## 2019-02-05 ENCOUNTER — APPOINTMENT (OUTPATIENT)
Dept: ULTRASOUND IMAGING | Age: 39
DRG: 433 | End: 2019-02-05
Payer: MEDICARE

## 2019-02-05 PROBLEM — K74.60 DECOMPENSATION OF CIRRHOSIS OF LIVER (HCC): Status: ACTIVE | Noted: 2019-02-05

## 2019-02-05 PROBLEM — K72.90 DECOMPENSATION OF CIRRHOSIS OF LIVER (HCC): Status: ACTIVE | Noted: 2019-02-05

## 2019-02-05 LAB
ABO: NORMAL
ACANTHOCYTES: ABNORMAL
ALBUMIN SERPL-MCNC: 2.1 G/DL (ref 3.5–5.1)
ANISOCYTOSIS: PRESENT
ANTIBODY SCREEN: NORMAL
BASOPHILS # BLD: 0.2 %
BASOPHILS ABSOLUTE: 0 THOU/MM3 (ref 0–0.1)
BILIRUBIN URINE: ABNORMAL
BLOOD, URINE: NEGATIVE
CHARACTER, URINE: CLEAR
COLOR: ABNORMAL
CRENATED RBC'S: ABNORMAL
EOSINOPHIL # BLD: 2.6 %
EOSINOPHILS ABSOLUTE: 0.3 THOU/MM3 (ref 0–0.4)
ERYTHROCYTE [DISTWIDTH] IN BLOOD BY AUTOMATED COUNT: 16.4 % (ref 11.5–14.5)
ERYTHROCYTE [DISTWIDTH] IN BLOOD BY AUTOMATED COUNT: 65.1 FL (ref 35–45)
GLUCOSE URINE: >= 1000 MG/DL
GRAM STAIN RESULT: NORMAL
HCT VFR BLD CALC: 31.3 % (ref 37–47)
HEMOGLOBIN: 9.8 GM/DL (ref 12–16)
ICTOTEST: NEGATIVE
IMMATURE GRANS (ABS): 0.05 THOU/MM3 (ref 0–0.07)
IMMATURE GRANULOCYTES: 0.5 %
INR BLD: 1.9 (ref 0.85–1.13)
KETONES, URINE: NEGATIVE
LEUKOCYTE ESTERASE, URINE: NEGATIVE
LYMPHOCYTES # BLD: 17.5 %
LYMPHOCYTES ABSOLUTE: 1.7 THOU/MM3 (ref 1–4.8)
MCH RBC QN AUTO: 33.9 PG (ref 26–33)
MCHC RBC AUTO-ENTMCNC: 31.3 GM/DL (ref 32.2–35.5)
MCV RBC AUTO: 108.3 FL (ref 81–99)
MONOCYTES # BLD: 9.7 %
MONOCYTES ABSOLUTE: 1 THOU/MM3 (ref 0.4–1.3)
NITRITE, URINE: NEGATIVE
NUCLEATED RED BLOOD CELLS: 0 /100 WBC
PATHOLOGIST REVIEW: ABNORMAL
PH UA: 6
PLATELET # BLD: 138 THOU/MM3 (ref 130–400)
PMV BLD AUTO: 10.7 FL (ref 9.4–12.4)
PROTEIN FLUID: 0.4 GM/DL
PROTEIN UA: NEGATIVE
RBC # BLD: 2.89 MILL/MM3 (ref 4.2–5.4)
RH FACTOR: NORMAL
SEG NEUTROPHILS: 69.5 %
SEGMENTED NEUTROPHILS ABSOLUTE COUNT: 6.9 THOU/MM3 (ref 1.8–7.7)
SPECIFIC GRAVITY, URINE: 1.02 (ref 1–1.03)
UROBILINOGEN, URINE: 4 EU/DL
WBC # BLD: 9.9 THOU/MM3 (ref 4.8–10.8)

## 2019-02-05 PROCEDURE — 36415 COLL VENOUS BLD VENIPUNCTURE: CPT

## 2019-02-05 PROCEDURE — 6370000000 HC RX 637 (ALT 250 FOR IP): Performed by: FAMILY MEDICINE

## 2019-02-05 PROCEDURE — 82040 ASSAY OF SERUM ALBUMIN: CPT

## 2019-02-05 PROCEDURE — 49083 ABD PARACENTESIS W/IMAGING: CPT

## 2019-02-05 PROCEDURE — 85610 PROTHROMBIN TIME: CPT

## 2019-02-05 PROCEDURE — 86850 RBC ANTIBODY SCREEN: CPT

## 2019-02-05 PROCEDURE — 6360000002 HC RX W HCPCS: Performed by: NURSE PRACTITIONER

## 2019-02-05 PROCEDURE — 81003 URINALYSIS AUTO W/O SCOPE: CPT

## 2019-02-05 PROCEDURE — 0W9G30Z DRAINAGE OF PERITONEAL CAVITY WITH DRAINAGE DEVICE, PERCUTANEOUS APPROACH: ICD-10-PCS | Performed by: FAMILY MEDICINE

## 2019-02-05 PROCEDURE — P9017 PLASMA 1 DONOR FRZ W/IN 8 HR: HCPCS

## 2019-02-05 PROCEDURE — 86900 BLOOD TYPING SEROLOGIC ABO: CPT

## 2019-02-05 PROCEDURE — 6370000000 HC RX 637 (ALT 250 FOR IP): Performed by: INTERNAL MEDICINE

## 2019-02-05 PROCEDURE — 89050 BODY FLUID CELL COUNT: CPT

## 2019-02-05 PROCEDURE — 99220 PR INITIAL OBSERVATION CARE/DAY 70 MINUTES: CPT | Performed by: NURSE PRACTITIONER

## 2019-02-05 PROCEDURE — 99221 1ST HOSP IP/OBS SF/LOW 40: CPT | Performed by: INTERNAL MEDICINE

## 2019-02-05 PROCEDURE — 36430 TRANSFUSION BLD/BLD COMPNT: CPT

## 2019-02-05 PROCEDURE — 6360000002 HC RX W HCPCS: Performed by: INTERNAL MEDICINE

## 2019-02-05 PROCEDURE — 87205 SMEAR GRAM STAIN: CPT

## 2019-02-05 PROCEDURE — 6370000000 HC RX 637 (ALT 250 FOR IP): Performed by: NURSE PRACTITIONER

## 2019-02-05 PROCEDURE — 2580000003 HC RX 258: Performed by: INTERNAL MEDICINE

## 2019-02-05 PROCEDURE — 84157 ASSAY OF PROTEIN OTHER: CPT

## 2019-02-05 PROCEDURE — 1200000000 HC SEMI PRIVATE

## 2019-02-05 PROCEDURE — 2709999900 HC NON-CHARGEABLE SUPPLY

## 2019-02-05 PROCEDURE — 87070 CULTURE OTHR SPECIMN AEROBIC: CPT

## 2019-02-05 PROCEDURE — 87075 CULTR BACTERIA EXCEPT BLOOD: CPT

## 2019-02-05 PROCEDURE — 86901 BLOOD TYPING SEROLOGIC RH(D): CPT

## 2019-02-05 RX ORDER — LACTULOSE 10 G/15ML
20 SOLUTION ORAL 2 TIMES DAILY
Status: DISCONTINUED | OUTPATIENT
Start: 2019-02-05 | End: 2019-02-06

## 2019-02-05 RX ORDER — PANTOPRAZOLE SODIUM 40 MG/1
40 TABLET, DELAYED RELEASE ORAL 2 TIMES DAILY WITH MEALS
Status: DISCONTINUED | OUTPATIENT
Start: 2019-02-05 | End: 2019-02-08 | Stop reason: HOSPADM

## 2019-02-05 RX ORDER — PREDNISOLONE SODIUM PHOSPHATE 15 MG/5ML
40 SOLUTION ORAL DAILY
Status: DISCONTINUED | OUTPATIENT
Start: 2019-02-05 | End: 2019-02-08 | Stop reason: HOSPADM

## 2019-02-05 RX ORDER — BUMETANIDE 1 MG/1
1 TABLET ORAL 2 TIMES DAILY
Status: DISCONTINUED | OUTPATIENT
Start: 2019-02-05 | End: 2019-02-08 | Stop reason: HOSPADM

## 2019-02-05 RX ORDER — MIDODRINE HYDROCHLORIDE 10 MG/1
10 TABLET ORAL
Status: DISCONTINUED | OUTPATIENT
Start: 2019-02-05 | End: 2019-02-08 | Stop reason: HOSPADM

## 2019-02-05 RX ORDER — LEVOTHYROXINE SODIUM 88 UG/1
88 TABLET ORAL DAILY
Status: DISCONTINUED | OUTPATIENT
Start: 2019-02-05 | End: 2019-02-08 | Stop reason: HOSPADM

## 2019-02-05 RX ORDER — 0.9 % SODIUM CHLORIDE 0.9 %
250 INTRAVENOUS SOLUTION INTRAVENOUS ONCE
Status: COMPLETED | OUTPATIENT
Start: 2019-02-05 | End: 2019-02-05

## 2019-02-05 RX ORDER — TRAMADOL HYDROCHLORIDE 50 MG/1
50 TABLET ORAL EVERY 6 HOURS PRN
Status: DISCONTINUED | OUTPATIENT
Start: 2019-02-05 | End: 2019-02-08 | Stop reason: HOSPADM

## 2019-02-05 RX ORDER — FLUDROCORTISONE ACETATE 0.1 MG/1
0.1 TABLET ORAL DAILY
Status: DISCONTINUED | OUTPATIENT
Start: 2019-02-05 | End: 2019-02-08 | Stop reason: HOSPADM

## 2019-02-05 RX ORDER — ONDANSETRON 2 MG/ML
4 INJECTION INTRAMUSCULAR; INTRAVENOUS ONCE
Status: COMPLETED | OUTPATIENT
Start: 2019-02-05 | End: 2019-02-05

## 2019-02-05 RX ORDER — FUROSEMIDE 10 MG/ML
40 INJECTION INTRAMUSCULAR; INTRAVENOUS ONCE
Status: COMPLETED | OUTPATIENT
Start: 2019-02-05 | End: 2019-02-05

## 2019-02-05 RX ORDER — NADOLOL 40 MG/1
20 TABLET ORAL DAILY
Status: DISCONTINUED | OUTPATIENT
Start: 2019-02-05 | End: 2019-02-08 | Stop reason: HOSPADM

## 2019-02-05 RX ORDER — NITROFURANTOIN 25; 75 MG/1; MG/1
100 CAPSULE ORAL EVERY 12 HOURS SCHEDULED
Status: DISCONTINUED | OUTPATIENT
Start: 2019-02-05 | End: 2019-02-08 | Stop reason: HOSPADM

## 2019-02-05 RX ORDER — MULTIVITAMIN WITH FOLIC ACID 400 MCG
1 TABLET ORAL DAILY
Status: DISCONTINUED | OUTPATIENT
Start: 2019-02-05 | End: 2019-02-08 | Stop reason: HOSPADM

## 2019-02-05 RX ORDER — FOLIC ACID 1 MG/1
1 TABLET ORAL DAILY
Status: DISCONTINUED | OUTPATIENT
Start: 2019-02-05 | End: 2019-02-08 | Stop reason: HOSPADM

## 2019-02-05 RX ORDER — SODIUM CHLORIDE 1000 MG
1 TABLET, SOLUBLE MISCELLANEOUS 2 TIMES DAILY WITH MEALS
Status: DISCONTINUED | OUTPATIENT
Start: 2019-02-05 | End: 2019-02-08

## 2019-02-05 RX ORDER — THIAMINE MONONITRATE (VIT B1) 100 MG
100 TABLET ORAL DAILY
Status: DISCONTINUED | OUTPATIENT
Start: 2019-02-05 | End: 2019-02-08 | Stop reason: HOSPADM

## 2019-02-05 RX ADMIN — FLUDROCORTISONE ACETATE 0.1 MG: 0.1 TABLET ORAL at 08:52

## 2019-02-05 RX ADMIN — NITROFURANTOIN MONOHYDRATE/MACROCRYSTALLINE 100 MG: 25; 75 CAPSULE ORAL at 13:56

## 2019-02-05 RX ADMIN — SODIUM CHLORIDE TAB 1 GM 1 G: 1 TAB at 17:08

## 2019-02-05 RX ADMIN — PHYTONADIONE 10 MG: 10 INJECTION, EMULSION INTRAMUSCULAR; INTRAVENOUS; SUBCUTANEOUS at 08:44

## 2019-02-05 RX ADMIN — TRAMADOL HYDROCHLORIDE 50 MG: 50 TABLET, FILM COATED ORAL at 19:45

## 2019-02-05 RX ADMIN — MIDODRINE HYDROCHLORIDE 10 MG: 10 TABLET ORAL at 17:08

## 2019-02-05 RX ADMIN — PANTOPRAZOLE SODIUM 40 MG: 40 TABLET, DELAYED RELEASE ORAL at 17:08

## 2019-02-05 RX ADMIN — PANTOPRAZOLE SODIUM 40 MG: 40 TABLET, DELAYED RELEASE ORAL at 08:52

## 2019-02-05 RX ADMIN — TRAMADOL HYDROCHLORIDE 50 MG: 50 TABLET, FILM COATED ORAL at 08:52

## 2019-02-05 RX ADMIN — THERA TABS 1 TABLET: TAB at 08:52

## 2019-02-05 RX ADMIN — LACTULOSE 20 G: 20 SOLUTION ORAL at 08:53

## 2019-02-05 RX ADMIN — MIDODRINE HYDROCHLORIDE 10 MG: 10 TABLET ORAL at 08:52

## 2019-02-05 RX ADMIN — LACTULOSE 20 G: 20 SOLUTION ORAL at 19:45

## 2019-02-05 RX ADMIN — ONDANSETRON 4 MG: 2 INJECTION INTRAMUSCULAR; INTRAVENOUS at 01:17

## 2019-02-05 RX ADMIN — SODIUM CHLORIDE 250 ML: 9 INJECTION, SOLUTION INTRAVENOUS at 11:11

## 2019-02-05 RX ADMIN — RIFAXIMIN 550 MG: 550 TABLET ORAL at 08:52

## 2019-02-05 RX ADMIN — RIFAXIMIN 550 MG: 550 TABLET ORAL at 19:45

## 2019-02-05 RX ADMIN — VITAMIN D, TAB 1000IU (100/BT) 1000 UNITS: 25 TAB at 08:55

## 2019-02-05 RX ADMIN — MIDODRINE HYDROCHLORIDE 10 MG: 10 TABLET ORAL at 11:22

## 2019-02-05 RX ADMIN — FOLIC ACID 1 MG: 1 TABLET ORAL at 08:52

## 2019-02-05 RX ADMIN — Medication 40 MG: at 08:53

## 2019-02-05 RX ADMIN — FUROSEMIDE 40 MG: 10 INJECTION, SOLUTION INTRAMUSCULAR; INTRAVENOUS at 05:15

## 2019-02-05 RX ADMIN — SODIUM CHLORIDE TAB 1 GM 1 G: 1 TAB at 08:52

## 2019-02-05 RX ADMIN — Medication 100 MG: at 08:52

## 2019-02-05 RX ADMIN — LEVOTHYROXINE SODIUM 88 MCG: 88 TABLET ORAL at 06:43

## 2019-02-05 ASSESSMENT — PAIN SCALES - GENERAL
PAINLEVEL_OUTOF10: 9
PAINLEVEL_OUTOF10: 9
PAINLEVEL_OUTOF10: 10
PAINLEVEL_OUTOF10: 10
PAINLEVEL_OUTOF10: 5
PAINLEVEL_OUTOF10: 9
PAINLEVEL_OUTOF10: 10

## 2019-02-05 ASSESSMENT — PAIN DESCRIPTION - DESCRIPTORS
DESCRIPTORS: ACHING
DESCRIPTORS: BURNING;SORE
DESCRIPTORS: BURNING

## 2019-02-05 ASSESSMENT — PAIN - FUNCTIONAL ASSESSMENT
PAIN_FUNCTIONAL_ASSESSMENT: PREVENTS OR INTERFERES SOME ACTIVE ACTIVITIES AND ADLS
PAIN_FUNCTIONAL_ASSESSMENT: PREVENTS OR INTERFERES WITH MANY ACTIVE NOT PASSIVE ACTIVITIES

## 2019-02-05 ASSESSMENT — PAIN DESCRIPTION - PROGRESSION
CLINICAL_PROGRESSION: GRADUALLY WORSENING
CLINICAL_PROGRESSION: GRADUALLY WORSENING

## 2019-02-05 ASSESSMENT — PAIN DESCRIPTION - LOCATION
LOCATION: GENERALIZED

## 2019-02-05 ASSESSMENT — PAIN DESCRIPTION - PAIN TYPE
TYPE: ACUTE PAIN

## 2019-02-05 ASSESSMENT — PAIN DESCRIPTION - FREQUENCY
FREQUENCY: CONTINUOUS
FREQUENCY: CONTINUOUS

## 2019-02-06 LAB
ANION GAP SERPL CALCULATED.3IONS-SCNC: 14 MEQ/L (ref 8–16)
BODY FLUID RBC: < 2000 /CUMM
BUN BLDV-MCNC: 3 MG/DL (ref 7–22)
CALCIUM SERPL-MCNC: 7.6 MG/DL (ref 8.5–10.5)
CHARACTER, BODY FLUID: CLEAR
CHLORIDE BLD-SCNC: 104 MEQ/L (ref 98–111)
CO2: 21 MEQ/L (ref 23–33)
COLOR: YELLOW
CREAT SERPL-MCNC: 0.2 MG/DL (ref 0.4–1.2)
EKG ATRIAL RATE: 62 BPM
EKG P AXIS: 35 DEGREES
EKG P-R INTERVAL: 132 MS
EKG Q-T INTERVAL: 452 MS
EKG QRS DURATION: 70 MS
EKG QTC CALCULATION (BAZETT): 458 MS
EKG R AXIS: 18 DEGREES
EKG T AXIS: 15 DEGREES
EKG VENTRICULAR RATE: 62 BPM
ERYTHROCYTE [DISTWIDTH] IN BLOOD BY AUTOMATED COUNT: 16.1 % (ref 11.5–14.5)
ERYTHROCYTE [DISTWIDTH] IN BLOOD BY AUTOMATED COUNT: 63.7 FL (ref 35–45)
GFR SERPL CREATININE-BSD FRML MDRD: > 90 ML/MIN/1.73M2
GLUCOSE BLD-MCNC: 94 MG/DL (ref 70–108)
HCT VFR BLD CALC: 29.5 % (ref 37–47)
HEMOGLOBIN: 9.4 GM/DL (ref 12–16)
INR BLD: 2.17 (ref 0.85–1.13)
MAGNESIUM: 1.7 MG/DL (ref 1.6–2.4)
MCH RBC QN AUTO: 34.4 PG (ref 26–33)
MCHC RBC AUTO-ENTMCNC: 31.9 GM/DL (ref 32.2–35.5)
MCV RBC AUTO: 108.1 FL (ref 81–99)
MESOTHELIAL CELLS BODY FLUID: NORMAL
MONONUCLEAR CELLS BODY FLUID: 88 %
ORGANISM: ABNORMAL
PATHOLOGIST REVIEW: NORMAL
PLATELET # BLD: 116 THOU/MM3 (ref 130–400)
PMV BLD AUTO: 10.7 FL (ref 9.4–12.4)
POLYMORPHONUCLEAR CELLS BODY FLUID: 12 %
POTASSIUM REFLEX MAGNESIUM: 3.2 MEQ/L (ref 3.5–5.2)
POTASSIUM SERPL-SCNC: 3.2 MEQ/L (ref 3.5–5.2)
RBC # BLD: 2.73 MILL/MM3 (ref 4.2–5.4)
SODIUM BLD-SCNC: 139 MEQ/L (ref 135–145)
SPECIMEN: NORMAL
TOTAL NUCLEATED CELLS BODY FLUID: 40 /CUMM (ref 0–500)
TOTAL VOLUME RECEIVED BODY FLUID: 80 ML
URINE CULTURE REFLEX: ABNORMAL
WBC # BLD: 9.6 THOU/MM3 (ref 4.8–10.8)

## 2019-02-06 PROCEDURE — 6370000000 HC RX 637 (ALT 250 FOR IP): Performed by: INTERNAL MEDICINE

## 2019-02-06 PROCEDURE — 6370000000 HC RX 637 (ALT 250 FOR IP): Performed by: NURSE PRACTITIONER

## 2019-02-06 PROCEDURE — 93005 ELECTROCARDIOGRAM TRACING: CPT | Performed by: FAMILY MEDICINE

## 2019-02-06 PROCEDURE — 85027 COMPLETE CBC AUTOMATED: CPT

## 2019-02-06 PROCEDURE — 85610 PROTHROMBIN TIME: CPT

## 2019-02-06 PROCEDURE — 93010 ELECTROCARDIOGRAM REPORT: CPT | Performed by: INTERNAL MEDICINE

## 2019-02-06 PROCEDURE — 6370000000 HC RX 637 (ALT 250 FOR IP): Performed by: FAMILY MEDICINE

## 2019-02-06 PROCEDURE — 99233 SBSQ HOSP IP/OBS HIGH 50: CPT | Performed by: FAMILY MEDICINE

## 2019-02-06 PROCEDURE — 88112 CYTOPATH CELL ENHANCE TECH: CPT

## 2019-02-06 PROCEDURE — 83735 ASSAY OF MAGNESIUM: CPT

## 2019-02-06 PROCEDURE — 80048 BASIC METABOLIC PNL TOTAL CA: CPT

## 2019-02-06 PROCEDURE — 97165 OT EVAL LOW COMPLEX 30 MIN: CPT

## 2019-02-06 PROCEDURE — 1200000000 HC SEMI PRIVATE

## 2019-02-06 PROCEDURE — 36415 COLL VENOUS BLD VENIPUNCTURE: CPT

## 2019-02-06 PROCEDURE — 2709999900 HC NON-CHARGEABLE SUPPLY

## 2019-02-06 PROCEDURE — 99232 SBSQ HOSP IP/OBS MODERATE 35: CPT | Performed by: NURSE PRACTITIONER

## 2019-02-06 PROCEDURE — 97110 THERAPEUTIC EXERCISES: CPT

## 2019-02-06 RX ORDER — SPIRONOLACTONE 25 MG/1
50 TABLET ORAL DAILY
Status: DISCONTINUED | OUTPATIENT
Start: 2019-02-06 | End: 2019-02-08 | Stop reason: HOSPADM

## 2019-02-06 RX ORDER — SIMETHICONE 80 MG
80 TABLET,CHEWABLE ORAL EVERY 6 HOURS PRN
Status: DISCONTINUED | OUTPATIENT
Start: 2019-02-06 | End: 2019-02-08 | Stop reason: HOSPADM

## 2019-02-06 RX ORDER — LACTULOSE 10 G/15ML
20 SOLUTION ORAL DAILY
Status: DISCONTINUED | OUTPATIENT
Start: 2019-02-07 | End: 2019-02-08 | Stop reason: HOSPADM

## 2019-02-06 RX ADMIN — MIDODRINE HYDROCHLORIDE 10 MG: 10 TABLET ORAL at 16:25

## 2019-02-06 RX ADMIN — SIMETHICONE CHEW TAB 80 MG 80 MG: 80 TABLET ORAL at 22:45

## 2019-02-06 RX ADMIN — PANTOPRAZOLE SODIUM 40 MG: 40 TABLET, DELAYED RELEASE ORAL at 16:44

## 2019-02-06 RX ADMIN — SIMETHICONE CHEW TAB 80 MG 80 MG: 80 TABLET ORAL at 17:43

## 2019-02-06 RX ADMIN — NADOLOL 20 MG: 40 TABLET ORAL at 09:47

## 2019-02-06 RX ADMIN — Medication 40 MG: at 12:35

## 2019-02-06 RX ADMIN — SODIUM CHLORIDE TAB 1 GM 1 G: 1 TAB at 17:48

## 2019-02-06 RX ADMIN — NITROFURANTOIN MONOHYDRATE/MACROCRYSTALLINE 100 MG: 25; 75 CAPSULE ORAL at 22:43

## 2019-02-06 RX ADMIN — SPIRONOLACTONE 50 MG: 25 TABLET, FILM COATED ORAL at 12:36

## 2019-02-06 RX ADMIN — TRAMADOL HYDROCHLORIDE 50 MG: 50 TABLET, FILM COATED ORAL at 22:43

## 2019-02-06 RX ADMIN — RIFAXIMIN 550 MG: 550 TABLET ORAL at 22:43

## 2019-02-06 RX ADMIN — LEVOTHYROXINE SODIUM 88 MCG: 88 TABLET ORAL at 05:09

## 2019-02-06 RX ADMIN — PANTOPRAZOLE SODIUM 40 MG: 40 TABLET, DELAYED RELEASE ORAL at 09:47

## 2019-02-06 RX ADMIN — NITROFURANTOIN MONOHYDRATE/MACROCRYSTALLINE 100 MG: 25; 75 CAPSULE ORAL at 09:35

## 2019-02-06 RX ADMIN — TRAMADOL HYDROCHLORIDE 50 MG: 50 TABLET, FILM COATED ORAL at 12:35

## 2019-02-06 RX ADMIN — SIMETHICONE CHEW TAB 80 MG 80 MG: 80 TABLET ORAL at 13:45

## 2019-02-06 RX ADMIN — BUMETANIDE 1 MG: 1 TABLET ORAL at 09:35

## 2019-02-06 RX ADMIN — FOLIC ACID 1 MG: 1 TABLET ORAL at 09:36

## 2019-02-06 RX ADMIN — Medication 100 MG: at 09:37

## 2019-02-06 RX ADMIN — MIDODRINE HYDROCHLORIDE 10 MG: 10 TABLET ORAL at 12:40

## 2019-02-06 RX ADMIN — MIDODRINE HYDROCHLORIDE 10 MG: 10 TABLET ORAL at 09:36

## 2019-02-06 RX ADMIN — RIFAXIMIN 550 MG: 550 TABLET ORAL at 09:37

## 2019-02-06 RX ADMIN — BUMETANIDE 1 MG: 1 TABLET ORAL at 19:43

## 2019-02-06 RX ADMIN — VITAMIN D, TAB 1000IU (100/BT) 1000 UNITS: 25 TAB at 09:35

## 2019-02-06 RX ADMIN — LACTULOSE 20 G: 20 SOLUTION ORAL at 09:47

## 2019-02-06 RX ADMIN — SODIUM CHLORIDE TAB 1 GM 1 G: 1 TAB at 09:36

## 2019-02-06 RX ADMIN — FLUDROCORTISONE ACETATE 0.1 MG: 0.1 TABLET ORAL at 09:35

## 2019-02-06 RX ADMIN — THERA TABS 1 TABLET: TAB at 09:35

## 2019-02-06 RX ADMIN — POTASSIUM BICARBONATE 40 MEQ: 782 TABLET, EFFERVESCENT ORAL at 12:43

## 2019-02-06 RX ADMIN — TRAMADOL HYDROCHLORIDE 50 MG: 50 TABLET, FILM COATED ORAL at 05:09

## 2019-02-06 RX ADMIN — POTASSIUM BICARBONATE 40 MEQ: 782 TABLET, EFFERVESCENT ORAL at 17:43

## 2019-02-06 ASSESSMENT — PAIN DESCRIPTION - PAIN TYPE
TYPE: ACUTE PAIN

## 2019-02-06 ASSESSMENT — PAIN DESCRIPTION - FREQUENCY
FREQUENCY: CONTINUOUS

## 2019-02-06 ASSESSMENT — PAIN DESCRIPTION - LOCATION
LOCATION: ABDOMEN
LOCATION: CHEST
LOCATION: ABDOMEN
LOCATION: CHEST;ABDOMEN
LOCATION: ABDOMEN

## 2019-02-06 ASSESSMENT — PAIN DESCRIPTION - ONSET
ONSET: ON-GOING

## 2019-02-06 ASSESSMENT — PAIN SCALES - GENERAL
PAINLEVEL_OUTOF10: 7
PAINLEVEL_OUTOF10: 10
PAINLEVEL_OUTOF10: 9
PAINLEVEL_OUTOF10: 0
PAINLEVEL_OUTOF10: 8
PAINLEVEL_OUTOF10: 7
PAINLEVEL_OUTOF10: 8
PAINLEVEL_OUTOF10: 6
PAINLEVEL_OUTOF10: 7
PAINLEVEL_OUTOF10: 10
PAINLEVEL_OUTOF10: 10

## 2019-02-06 ASSESSMENT — PAIN DESCRIPTION - DESCRIPTORS
DESCRIPTORS: CONSTANT;DISCOMFORT
DESCRIPTORS: ACHING
DESCRIPTORS: CONSTANT;DISCOMFORT
DESCRIPTORS: ACHING;DISCOMFORT
DESCRIPTORS: CONSTANT;DISCOMFORT

## 2019-02-06 ASSESSMENT — PAIN DESCRIPTION - ORIENTATION
ORIENTATION: RIGHT;LEFT
ORIENTATION: RIGHT
ORIENTATION: RIGHT

## 2019-02-06 ASSESSMENT — PAIN DESCRIPTION - PROGRESSION
CLINICAL_PROGRESSION: NOT CHANGED
CLINICAL_PROGRESSION: NOT CHANGED
CLINICAL_PROGRESSION: GRADUALLY WORSENING
CLINICAL_PROGRESSION: NOT CHANGED
CLINICAL_PROGRESSION: GRADUALLY IMPROVING

## 2019-02-07 LAB
ANION GAP SERPL CALCULATED.3IONS-SCNC: 10 MEQ/L (ref 8–16)
BUN BLDV-MCNC: 3 MG/DL (ref 7–22)
CALCIUM SERPL-MCNC: 7.6 MG/DL (ref 8.5–10.5)
CHLORIDE BLD-SCNC: 98 MEQ/L (ref 98–111)
CO2: 28 MEQ/L (ref 23–33)
CREAT SERPL-MCNC: < 0.2 MG/DL (ref 0.4–1.2)
ERYTHROCYTE [DISTWIDTH] IN BLOOD BY AUTOMATED COUNT: 15.6 % (ref 11.5–14.5)
ERYTHROCYTE [DISTWIDTH] IN BLOOD BY AUTOMATED COUNT: 59.3 FL (ref 35–45)
GFR SERPL CREATININE-BSD FRML MDRD: > 90 ML/MIN/1.73M2
GLUCOSE BLD-MCNC: 101 MG/DL (ref 70–108)
HCT VFR BLD CALC: 29.2 % (ref 37–47)
HEMOGLOBIN: 9.6 GM/DL (ref 12–16)
MAGNESIUM: 1.4 MG/DL (ref 1.6–2.4)
MCH RBC QN AUTO: 34 PG (ref 26–33)
MCHC RBC AUTO-ENTMCNC: 32.9 GM/DL (ref 32.2–35.5)
MCV RBC AUTO: 103.5 FL (ref 81–99)
PLATELET # BLD: 120 THOU/MM3 (ref 130–400)
PMV BLD AUTO: 10.4 FL (ref 9.4–12.4)
POTASSIUM SERPL-SCNC: 2.9 MEQ/L (ref 3.5–5.2)
POTASSIUM SERPL-SCNC: 3.2 MEQ/L (ref 3.5–5.2)
RBC # BLD: 2.82 MILL/MM3 (ref 4.2–5.4)
SODIUM BLD-SCNC: 136 MEQ/L (ref 135–145)
WBC # BLD: 10.5 THOU/MM3 (ref 4.8–10.8)

## 2019-02-07 PROCEDURE — 97162 PT EVAL MOD COMPLEX 30 MIN: CPT

## 2019-02-07 PROCEDURE — 84132 ASSAY OF SERUM POTASSIUM: CPT

## 2019-02-07 PROCEDURE — 6370000000 HC RX 637 (ALT 250 FOR IP): Performed by: NURSE PRACTITIONER

## 2019-02-07 PROCEDURE — 1200000000 HC SEMI PRIVATE

## 2019-02-07 PROCEDURE — 80048 BASIC METABOLIC PNL TOTAL CA: CPT

## 2019-02-07 PROCEDURE — 6370000000 HC RX 637 (ALT 250 FOR IP): Performed by: FAMILY MEDICINE

## 2019-02-07 PROCEDURE — 99232 SBSQ HOSP IP/OBS MODERATE 35: CPT | Performed by: FAMILY MEDICINE

## 2019-02-07 PROCEDURE — 36415 COLL VENOUS BLD VENIPUNCTURE: CPT

## 2019-02-07 PROCEDURE — 6360000002 HC RX W HCPCS: Performed by: FAMILY MEDICINE

## 2019-02-07 PROCEDURE — 83735 ASSAY OF MAGNESIUM: CPT

## 2019-02-07 PROCEDURE — 85027 COMPLETE CBC AUTOMATED: CPT

## 2019-02-07 PROCEDURE — 6370000000 HC RX 637 (ALT 250 FOR IP): Performed by: INTERNAL MEDICINE

## 2019-02-07 PROCEDURE — 97110 THERAPEUTIC EXERCISES: CPT

## 2019-02-07 PROCEDURE — 99232 SBSQ HOSP IP/OBS MODERATE 35: CPT | Performed by: INTERNAL MEDICINE

## 2019-02-07 PROCEDURE — 2709999900 HC NON-CHARGEABLE SUPPLY

## 2019-02-07 RX ORDER — MAGNESIUM SULFATE IN WATER 40 MG/ML
2 INJECTION, SOLUTION INTRAVENOUS ONCE
Status: COMPLETED | OUTPATIENT
Start: 2019-02-07 | End: 2019-02-07

## 2019-02-07 RX ORDER — POTASSIUM CHLORIDE 20 MEQ/1
40 TABLET, EXTENDED RELEASE ORAL ONCE
Status: COMPLETED | OUTPATIENT
Start: 2019-02-07 | End: 2019-02-07

## 2019-02-07 RX ORDER — ONDANSETRON 4 MG/1
8 TABLET, ORALLY DISINTEGRATING ORAL EVERY 8 HOURS PRN
Status: DISCONTINUED | OUTPATIENT
Start: 2019-02-07 | End: 2019-02-08 | Stop reason: HOSPADM

## 2019-02-07 RX ORDER — POTASSIUM CHLORIDE 7.45 MG/ML
10 INJECTION INTRAVENOUS
Status: COMPLETED | OUTPATIENT
Start: 2019-02-07 | End: 2019-02-07

## 2019-02-07 RX ORDER — POTASSIUM CHLORIDE 750 MG/1
40 TABLET, FILM COATED, EXTENDED RELEASE ORAL ONCE
Status: COMPLETED | OUTPATIENT
Start: 2019-02-07 | End: 2019-02-07

## 2019-02-07 RX ADMIN — PANTOPRAZOLE SODIUM 40 MG: 40 TABLET, DELAYED RELEASE ORAL at 08:50

## 2019-02-07 RX ADMIN — LEVOTHYROXINE SODIUM 88 MCG: 88 TABLET ORAL at 06:27

## 2019-02-07 RX ADMIN — MIDODRINE HYDROCHLORIDE 10 MG: 10 TABLET ORAL at 08:50

## 2019-02-07 RX ADMIN — POTASSIUM CHLORIDE 10 MEQ: 7.46 INJECTION, SOLUTION INTRAVENOUS at 10:38

## 2019-02-07 RX ADMIN — POTASSIUM CHLORIDE 10 MEQ: 7.46 INJECTION, SOLUTION INTRAVENOUS at 08:49

## 2019-02-07 RX ADMIN — BUMETANIDE 1 MG: 1 TABLET ORAL at 08:50

## 2019-02-07 RX ADMIN — FLUDROCORTISONE ACETATE 0.1 MG: 0.1 TABLET ORAL at 08:50

## 2019-02-07 RX ADMIN — RIFAXIMIN 550 MG: 550 TABLET ORAL at 20:47

## 2019-02-07 RX ADMIN — POTASSIUM CHLORIDE 40 MEQ: 750 TABLET, EXTENDED RELEASE ORAL at 17:25

## 2019-02-07 RX ADMIN — TRAMADOL HYDROCHLORIDE 50 MG: 50 TABLET, FILM COATED ORAL at 23:18

## 2019-02-07 RX ADMIN — Medication 100 MG: at 08:50

## 2019-02-07 RX ADMIN — MAGNESIUM SULFATE HEPTAHYDRATE 2 G: 40 INJECTION, SOLUTION INTRAVENOUS at 19:39

## 2019-02-07 RX ADMIN — NITROFURANTOIN MONOHYDRATE/MACROCRYSTALLINE 100 MG: 25; 75 CAPSULE ORAL at 20:47

## 2019-02-07 RX ADMIN — SODIUM CHLORIDE TAB 1 GM 1 G: 1 TAB at 17:25

## 2019-02-07 RX ADMIN — NITROFURANTOIN MONOHYDRATE/MACROCRYSTALLINE 100 MG: 25; 75 CAPSULE ORAL at 08:50

## 2019-02-07 RX ADMIN — LACTULOSE 20 G: 20 SOLUTION ORAL at 08:49

## 2019-02-07 RX ADMIN — VITAMIN D, TAB 1000IU (100/BT) 1000 UNITS: 25 TAB at 08:49

## 2019-02-07 RX ADMIN — THERA TABS 1 TABLET: TAB at 08:50

## 2019-02-07 RX ADMIN — MIDODRINE HYDROCHLORIDE 10 MG: 10 TABLET ORAL at 17:27

## 2019-02-07 RX ADMIN — MIDODRINE HYDROCHLORIDE 10 MG: 10 TABLET ORAL at 13:55

## 2019-02-07 RX ADMIN — SODIUM CHLORIDE TAB 1 GM 1 G: 1 TAB at 08:50

## 2019-02-07 RX ADMIN — Medication 40 MG: at 10:38

## 2019-02-07 RX ADMIN — POTASSIUM CHLORIDE 40 MEQ: 20 TABLET, EXTENDED RELEASE ORAL at 08:53

## 2019-02-07 RX ADMIN — NADOLOL 20 MG: 40 TABLET ORAL at 08:50

## 2019-02-07 RX ADMIN — PANTOPRAZOLE SODIUM 40 MG: 40 TABLET, DELAYED RELEASE ORAL at 17:25

## 2019-02-07 RX ADMIN — FOLIC ACID 1 MG: 1 TABLET ORAL at 08:50

## 2019-02-07 RX ADMIN — RIFAXIMIN 550 MG: 550 TABLET ORAL at 08:50

## 2019-02-07 RX ADMIN — LIDOCAINE HYDROCHLORIDE 15 ML: 20 SOLUTION ORAL; TOPICAL at 17:25

## 2019-02-07 RX ADMIN — BUMETANIDE 1 MG: 1 TABLET ORAL at 20:47

## 2019-02-07 RX ADMIN — SPIRONOLACTONE 50 MG: 25 TABLET, FILM COATED ORAL at 08:49

## 2019-02-07 ASSESSMENT — PAIN SCALES - GENERAL
PAINLEVEL_OUTOF10: 3
PAINLEVEL_OUTOF10: 5
PAINLEVEL_OUTOF10: 7

## 2019-02-07 ASSESSMENT — PAIN DESCRIPTION - FREQUENCY
FREQUENCY: CONTINUOUS
FREQUENCY: CONTINUOUS

## 2019-02-07 ASSESSMENT — PAIN DESCRIPTION - LOCATION
LOCATION: ABDOMEN
LOCATION: ABDOMEN

## 2019-02-07 ASSESSMENT — PAIN DESCRIPTION - DESCRIPTORS
DESCRIPTORS: DISCOMFORT
DESCRIPTORS: DISCOMFORT

## 2019-02-07 ASSESSMENT — PAIN DESCRIPTION - ORIENTATION
ORIENTATION: RIGHT
ORIENTATION: RIGHT

## 2019-02-07 ASSESSMENT — PAIN DESCRIPTION - PAIN TYPE
TYPE: ACUTE PAIN
TYPE: ACUTE PAIN

## 2019-02-08 VITALS
BODY MASS INDEX: 27.58 KG/M2 | TEMPERATURE: 97.7 F | RESPIRATION RATE: 16 BRPM | OXYGEN SATURATION: 96 % | WEIGHT: 165.56 LBS | SYSTOLIC BLOOD PRESSURE: 103 MMHG | HEIGHT: 65 IN | HEART RATE: 63 BPM | DIASTOLIC BLOOD PRESSURE: 56 MMHG

## 2019-02-08 LAB
ANION GAP SERPL CALCULATED.3IONS-SCNC: 10 MEQ/L (ref 8–16)
BUN BLDV-MCNC: 4 MG/DL (ref 7–22)
CALCIUM SERPL-MCNC: 8.2 MG/DL (ref 8.5–10.5)
CHLORIDE BLD-SCNC: 98 MEQ/L (ref 98–111)
CO2: 31 MEQ/L (ref 23–33)
CREAT SERPL-MCNC: 0.4 MG/DL (ref 0.4–1.2)
GFR SERPL CREATININE-BSD FRML MDRD: > 90 ML/MIN/1.73M2
GLUCOSE BLD-MCNC: 110 MG/DL (ref 70–108)
MAGNESIUM: 1.8 MG/DL (ref 1.6–2.4)
POTASSIUM SERPL-SCNC: 3.6 MEQ/L (ref 3.5–5.2)
SODIUM BLD-SCNC: 139 MEQ/L (ref 135–145)

## 2019-02-08 PROCEDURE — 36415 COLL VENOUS BLD VENIPUNCTURE: CPT

## 2019-02-08 PROCEDURE — 6370000000 HC RX 637 (ALT 250 FOR IP): Performed by: NURSE PRACTITIONER

## 2019-02-08 PROCEDURE — 99239 HOSP IP/OBS DSCHRG MGMT >30: CPT | Performed by: INTERNAL MEDICINE

## 2019-02-08 PROCEDURE — 99232 SBSQ HOSP IP/OBS MODERATE 35: CPT | Performed by: NURSE PRACTITIONER

## 2019-02-08 PROCEDURE — 80048 BASIC METABOLIC PNL TOTAL CA: CPT

## 2019-02-08 PROCEDURE — 97116 GAIT TRAINING THERAPY: CPT

## 2019-02-08 PROCEDURE — 83735 ASSAY OF MAGNESIUM: CPT

## 2019-02-08 PROCEDURE — 97110 THERAPEUTIC EXERCISES: CPT

## 2019-02-08 PROCEDURE — 6370000000 HC RX 637 (ALT 250 FOR IP): Performed by: INTERNAL MEDICINE

## 2019-02-08 RX ORDER — POTASSIUM CHLORIDE 750 MG/1
20 TABLET, FILM COATED, EXTENDED RELEASE ORAL 2 TIMES DAILY
Status: DISCONTINUED | OUTPATIENT
Start: 2019-02-08 | End: 2019-02-08 | Stop reason: HOSPADM

## 2019-02-08 RX ORDER — POTASSIUM CHLORIDE 1500 MG/1
20 TABLET, FILM COATED, EXTENDED RELEASE ORAL 2 TIMES DAILY
Qty: 60 TABLET | Refills: 3 | Status: SHIPPED | OUTPATIENT
Start: 2019-02-08 | End: 2019-02-27 | Stop reason: SDUPTHER

## 2019-02-08 RX ORDER — PREDNISONE 10 MG/1
10 TABLET ORAL DAILY
Qty: 7 TABLET | Refills: 0 | Status: SHIPPED | OUTPATIENT
Start: 2019-03-01 | End: 2019-03-08

## 2019-02-08 RX ORDER — POTASSIUM CHLORIDE 750 MG/1
20 TABLET, FILM COATED, EXTENDED RELEASE ORAL ONCE
Status: COMPLETED | OUTPATIENT
Start: 2019-02-08 | End: 2019-02-08

## 2019-02-08 RX ORDER — NITROFURANTOIN 25; 75 MG/1; MG/1
100 CAPSULE ORAL EVERY 12 HOURS SCHEDULED
Qty: 8 CAPSULE | Refills: 0 | Status: SHIPPED | OUTPATIENT
Start: 2019-02-08 | End: 2019-02-12

## 2019-02-08 RX ORDER — PREDNISONE 10 MG/1
30 TABLET ORAL DAILY
Qty: 21 TABLET | Refills: 0 | Status: SHIPPED | OUTPATIENT
Start: 2019-02-14 | End: 2019-02-21

## 2019-02-08 RX ORDER — BUMETANIDE 1 MG/1
1 TABLET ORAL 2 TIMES DAILY
Qty: 30 TABLET | Refills: 3 | Status: SHIPPED | OUTPATIENT
Start: 2019-02-08

## 2019-02-08 RX ORDER — SPIRONOLACTONE 50 MG/1
50 TABLET, FILM COATED ORAL DAILY
Qty: 30 TABLET | Refills: 3 | Status: SHIPPED | OUTPATIENT
Start: 2019-02-09

## 2019-02-08 RX ORDER — PREDNISONE 10 MG/1
20 TABLET ORAL DAILY
Qty: 14 TABLET | Refills: 0 | Status: SHIPPED | OUTPATIENT
Start: 2019-02-21 | End: 2019-02-28

## 2019-02-08 RX ADMIN — LACTULOSE 20 G: 20 SOLUTION ORAL at 07:38

## 2019-02-08 RX ADMIN — RIFAXIMIN 550 MG: 550 TABLET ORAL at 07:38

## 2019-02-08 RX ADMIN — MAGNESIUM GLUCONATE 500 MG ORAL TABLET 400 MG: 500 TABLET ORAL at 09:27

## 2019-02-08 RX ADMIN — LEVOTHYROXINE SODIUM 88 MCG: 88 TABLET ORAL at 06:39

## 2019-02-08 RX ADMIN — FOLIC ACID 1 MG: 1 TABLET ORAL at 07:38

## 2019-02-08 RX ADMIN — THERA TABS 1 TABLET: TAB at 07:38

## 2019-02-08 RX ADMIN — BUMETANIDE 1 MG: 1 TABLET ORAL at 07:38

## 2019-02-08 RX ADMIN — NADOLOL 20 MG: 40 TABLET ORAL at 09:26

## 2019-02-08 RX ADMIN — NITROFURANTOIN MONOHYDRATE/MACROCRYSTALLINE 100 MG: 25; 75 CAPSULE ORAL at 07:38

## 2019-02-08 RX ADMIN — LIDOCAINE HYDROCHLORIDE 15 ML: 20 SOLUTION ORAL; TOPICAL at 06:39

## 2019-02-08 RX ADMIN — SPIRONOLACTONE 50 MG: 25 TABLET, FILM COATED ORAL at 07:38

## 2019-02-08 RX ADMIN — POTASSIUM CHLORIDE 20 MEQ: 750 TABLET, EXTENDED RELEASE ORAL at 09:26

## 2019-02-08 RX ADMIN — FLUDROCORTISONE ACETATE 0.1 MG: 0.1 TABLET ORAL at 07:38

## 2019-02-08 RX ADMIN — POTASSIUM CHLORIDE 20 MEQ: 750 TABLET, EXTENDED RELEASE ORAL at 09:25

## 2019-02-08 RX ADMIN — MIDODRINE HYDROCHLORIDE 10 MG: 10 TABLET ORAL at 07:38

## 2019-02-08 RX ADMIN — PANTOPRAZOLE SODIUM 40 MG: 40 TABLET, DELAYED RELEASE ORAL at 07:38

## 2019-02-08 RX ADMIN — Medication 100 MG: at 07:38

## 2019-02-08 RX ADMIN — MIDODRINE HYDROCHLORIDE 10 MG: 10 TABLET ORAL at 12:32

## 2019-02-08 RX ADMIN — SODIUM CHLORIDE TAB 1 GM 1 G: 1 TAB at 07:38

## 2019-02-08 RX ADMIN — VITAMIN D, TAB 1000IU (100/BT) 1000 UNITS: 25 TAB at 07:38

## 2019-02-08 RX ADMIN — Medication 40 MG: at 08:10

## 2019-02-08 RX ADMIN — LIDOCAINE HYDROCHLORIDE 15 ML: 20 SOLUTION ORAL; TOPICAL at 12:32

## 2019-02-08 ASSESSMENT — PAIN SCALES - GENERAL
PAINLEVEL_OUTOF10: 0

## 2019-02-11 LAB — BODY FLUID CULTURE, STERILE: NORMAL

## 2019-02-14 ENCOUNTER — HOSPITAL ENCOUNTER (EMERGENCY)
Age: 39
Discharge: HOME | End: 2019-02-14
Attending: FAMILY MEDICINE
Payer: MEDICARE

## 2019-02-14 ENCOUNTER — APPOINTMENT (OUTPATIENT)
Dept: CT IMAGING | Age: 39
End: 2019-02-14
Payer: MEDICARE

## 2019-02-14 ENCOUNTER — APPOINTMENT (OUTPATIENT)
Dept: GENERAL RADIOLOGY | Age: 39
End: 2019-02-14
Payer: MEDICARE

## 2019-02-14 VITALS
OXYGEN SATURATION: 98 % | HEART RATE: 74 BPM | RESPIRATION RATE: 16 BRPM | HEIGHT: 64 IN | SYSTOLIC BLOOD PRESSURE: 126 MMHG | WEIGHT: 165 LBS | BODY MASS INDEX: 28.17 KG/M2 | TEMPERATURE: 96.8 F | DIASTOLIC BLOOD PRESSURE: 78 MMHG

## 2019-02-14 DIAGNOSIS — K72.10 CHRONIC LIVER FAILURE WITHOUT HEPATIC COMA (HCC): ICD-10-CM

## 2019-02-14 DIAGNOSIS — R41.82 ALTERED MENTAL STATUS, UNSPECIFIED ALTERED MENTAL STATUS TYPE: Primary | ICD-10-CM

## 2019-02-14 LAB
ALBUMIN SERPL-MCNC: 2.4 GM/DL (ref 3.4–5)
ALP BLD-CCNC: 171 U/L (ref 46–116)
ALT SERPL-CCNC: 58 U/L (ref 14–63)
AMYLASE: 29 U/L (ref 25–115)
ANALYZED BY:: NORMAL
ANION GAP: 9 MEQ/L (ref 8–16)
APTT: 36.8 SECONDS (ref 22–38)
AST SERPL-CCNC: 112 U/L (ref 15–37)
BASOPHILS # BLD: 0.3 % (ref 0–3)
BILIRUB SERPL-MCNC: 9.3 MG/DL (ref 0.2–1)
BILIRUBIN DIRECT: 6.92 MG/DL (ref 0–0.2)
BUN BLDV-MCNC: 9 MG/DL (ref 7–18)
CHLORIDE BLD-SCNC: 93 MEQ/L (ref 98–107)
CO2: 37 MEQ/L (ref 21–32)
CREAT SERPL-MCNC: 0.9 MG/DL (ref 0.6–1.3)
DATE OF COLLECTION: NORMAL
DRAWN BY: NORMAL
EOSINOPHILS RELATIVE PERCENT: 1 % (ref 0–4)
ETHYL ALCOHOL: < 0.01 % (GM/DL)
GFR, ESTIMATED: 75 ML/MIN/1.73M2
GLUCOSE BLD-MCNC: 184 MG/DL (ref 74–106)
HCT VFR BLD CALC: 34.3 % (ref 37–47)
HEMOGLOBIN: 12.4 GM/DL (ref 12–16)
INR BLD: 2.7 (ref 0.85–1.13)
LACTATE: 4 MMOL/L (ref 0.9–1.7)
LIPASE: 94 U/L (ref 73–393)
LYMPHOCYTES # BLD: 14.3 % (ref 15–47)
Lab: 2048
Lab: NORMAL
MCH RBC QN AUTO: 35.5 PG (ref 27–31)
MCHC RBC AUTO-ENTMCNC: 36.1 GM/DL (ref 33–37)
MCV RBC AUTO: 98.3 FL (ref 81–99)
MONOCYTES: 6.9 % (ref 0–12)
PDW BLD-RTO: 13.1 % (ref 11.5–14.5)
PLATELET # BLD: 193 THOU/MM3 (ref 130–400)
PMV BLD AUTO: 7.8 FL (ref 7.4–10.4)
POC CALCIUM: 8.9 MG/DL (ref 8.5–10.1)
POTASSIUM SERPL-SCNC: 2.7 MEQ/L (ref 3.5–5.1)
RBC # BLD: 3.49 MILL/MM3 (ref 4.2–5.4)
SEGS: 77.5 % (ref 43–75)
SODIUM BLD-SCNC: 139 MEQ/L (ref 136–145)
TOTAL PROTEIN: 6.8 GM/DL (ref 6.4–8.2)
WBC # BLD: 9.5 THOU/MM3 (ref 4.8–10.8)

## 2019-02-14 PROCEDURE — 85610 PROTHROMBIN TIME: CPT

## 2019-02-14 PROCEDURE — 2709999900 HC NON-CHARGEABLE SUPPLY

## 2019-02-14 PROCEDURE — 80048 BASIC METABOLIC PNL TOTAL CA: CPT

## 2019-02-14 PROCEDURE — 2580000003 HC RX 258: Performed by: FAMILY MEDICINE

## 2019-02-14 PROCEDURE — 71046 X-RAY EXAM CHEST 2 VIEWS: CPT

## 2019-02-14 PROCEDURE — 99285 EMERGENCY DEPT VISIT HI MDM: CPT

## 2019-02-14 PROCEDURE — G0480 DRUG TEST DEF 1-7 CLASSES: HCPCS

## 2019-02-14 PROCEDURE — 87040 BLOOD CULTURE FOR BACTERIA: CPT

## 2019-02-14 PROCEDURE — 82140 ASSAY OF AMMONIA: CPT

## 2019-02-14 PROCEDURE — 85730 THROMBOPLASTIN TIME PARTIAL: CPT

## 2019-02-14 PROCEDURE — 36415 COLL VENOUS BLD VENIPUNCTURE: CPT

## 2019-02-14 PROCEDURE — 80076 HEPATIC FUNCTION PANEL: CPT

## 2019-02-14 PROCEDURE — 83605 ASSAY OF LACTIC ACID: CPT

## 2019-02-14 PROCEDURE — 85025 COMPLETE CBC W/AUTO DIFF WBC: CPT

## 2019-02-14 PROCEDURE — 83690 ASSAY OF LIPASE: CPT

## 2019-02-14 PROCEDURE — 70450 CT HEAD/BRAIN W/O DYE: CPT

## 2019-02-14 PROCEDURE — 82150 ASSAY OF AMYLASE: CPT

## 2019-02-14 RX ORDER — 0.9 % SODIUM CHLORIDE 0.9 %
1000 INTRAVENOUS SOLUTION INTRAVENOUS ONCE
Status: COMPLETED | OUTPATIENT
Start: 2019-02-14 | End: 2019-02-14

## 2019-02-14 RX ADMIN — SODIUM CHLORIDE 1000 ML: 9 INJECTION, SOLUTION INTRAVENOUS at 21:05

## 2019-02-15 LAB — AMMONIA: 103 UMOL/L (ref 11–60)

## 2019-02-17 ENCOUNTER — HOSPITAL ENCOUNTER (OUTPATIENT)
Age: 39
Discharge: HOME | End: 2019-02-17
Payer: MEDICARE

## 2019-02-17 LAB
ANION GAP SERPL CALCULATED.3IONS-SCNC: 12 MEQ/L (ref 8–16)
BUN BLDV-MCNC: 10 MG/DL (ref 7–22)
CALCIUM SERPL-MCNC: 8.7 MG/DL (ref 8.5–10.5)
CHLORIDE BLD-SCNC: 90 MEQ/L (ref 98–111)
CO2: 38 MEQ/L (ref 23–33)
CREAT SERPL-MCNC: 0.3 MG/DL (ref 0.4–1.2)
GFR SERPL CREATININE-BSD FRML MDRD: > 90 ML/MIN/1.73M2
GLUCOSE BLD-MCNC: 90 MG/DL (ref 70–108)
POTASSIUM SERPL-SCNC: 3.1 MEQ/L (ref 3.5–5.2)
SODIUM BLD-SCNC: 140 MEQ/L (ref 135–145)

## 2019-02-17 PROCEDURE — 80048 BASIC METABOLIC PNL TOTAL CA: CPT

## 2019-02-17 PROCEDURE — 36415 COLL VENOUS BLD VENIPUNCTURE: CPT

## 2019-02-19 ENCOUNTER — TELEPHONE (OUTPATIENT)
Dept: NEPHROLOGY | Age: 39
End: 2019-02-19

## 2019-02-20 LAB — BLOOD CULTURE, ROUTINE: NORMAL

## 2019-02-22 ENCOUNTER — HOSPITAL ENCOUNTER (OUTPATIENT)
Age: 39
Discharge: HOME | End: 2019-02-22
Payer: MEDICARE

## 2019-02-22 LAB
ALBUMIN SERPL-MCNC: 3.3 G/DL (ref 3.5–5.1)
ALP BLD-CCNC: 166 U/L (ref 38–126)
ALT SERPL-CCNC: 58 U/L (ref 11–66)
AMMONIA: 37 UMOL/L (ref 11–60)
AMYLASE: 39 U/L (ref 20–104)
ANION GAP SERPL CALCULATED.3IONS-SCNC: 16 MEQ/L (ref 8–16)
AST SERPL-CCNC: 116 U/L (ref 5–40)
BASOPHILS # BLD: 0.2 %
BASOPHILS ABSOLUTE: 0 THOU/MM3 (ref 0–0.1)
BILIRUB SERPL-MCNC: 10.3 MG/DL (ref 0.3–1.2)
BUN BLDV-MCNC: 19 MG/DL (ref 7–22)
CALCIUM SERPL-MCNC: 9.5 MG/DL (ref 8.5–10.5)
CHLORIDE BLD-SCNC: 84 MEQ/L (ref 98–111)
CO2: 35 MEQ/L (ref 23–33)
CREAT SERPL-MCNC: 0.6 MG/DL (ref 0.4–1.2)
EOSINOPHIL # BLD: 0.7 %
EOSINOPHILS ABSOLUTE: 0.1 THOU/MM3 (ref 0–0.4)
ERYTHROCYTE [DISTWIDTH] IN BLOOD BY AUTOMATED COUNT: 14.1 % (ref 11.5–14.5)
ERYTHROCYTE [DISTWIDTH] IN BLOOD BY AUTOMATED COUNT: 55.5 FL (ref 35–45)
GFR SERPL CREATININE-BSD FRML MDRD: > 90 ML/MIN/1.73M2
GLUCOSE BLD-MCNC: 126 MG/DL (ref 70–108)
HCT VFR BLD CALC: 36.9 % (ref 37–47)
HEMOGLOBIN: 11.7 GM/DL (ref 12–16)
IMMATURE GRANS (ABS): 0.06 THOU/MM3 (ref 0–0.07)
IMMATURE GRANULOCYTES: 0.5 %
INR BLD: 2.56 (ref 0.85–1.13)
LIPASE: 25.8 U/L (ref 5.6–51.3)
LYMPHOCYTES # BLD: 16.8 %
LYMPHOCYTES ABSOLUTE: 1.9 THOU/MM3 (ref 1–4.8)
MCH RBC QN AUTO: 34.2 PG (ref 26–33)
MCHC RBC AUTO-ENTMCNC: 31.7 GM/DL (ref 32.2–35.5)
MCV RBC AUTO: 107.9 FL (ref 81–99)
MONOCYTES # BLD: 8.5 %
MONOCYTES ABSOLUTE: 1 THOU/MM3 (ref 0.4–1.3)
NUCLEATED RED BLOOD CELLS: 0 /100 WBC
PLATELET # BLD: 134 THOU/MM3 (ref 130–400)
PMV BLD AUTO: 10.5 FL (ref 9.4–12.4)
POTASSIUM SERPL-SCNC: 3 MEQ/L (ref 3.5–5.2)
RBC # BLD: 3.42 MILL/MM3 (ref 4.2–5.4)
SEG NEUTROPHILS: 73.3 %
SEGMENTED NEUTROPHILS ABSOLUTE COUNT: 8.2 THOU/MM3 (ref 1.8–7.7)
SODIUM BLD-SCNC: 135 MEQ/L (ref 135–145)
TOTAL PROTEIN: 6.9 G/DL (ref 6.1–8)
WBC # BLD: 11.2 THOU/MM3 (ref 4.8–10.8)

## 2019-02-22 PROCEDURE — 80053 COMPREHEN METABOLIC PANEL: CPT

## 2019-02-22 PROCEDURE — 85610 PROTHROMBIN TIME: CPT

## 2019-02-22 PROCEDURE — 82105 ALPHA-FETOPROTEIN SERUM: CPT

## 2019-02-22 PROCEDURE — 83690 ASSAY OF LIPASE: CPT

## 2019-02-22 PROCEDURE — 85025 COMPLETE CBC W/AUTO DIFF WBC: CPT

## 2019-02-22 PROCEDURE — 82150 ASSAY OF AMYLASE: CPT

## 2019-02-22 PROCEDURE — 36415 COLL VENOUS BLD VENIPUNCTURE: CPT

## 2019-02-22 PROCEDURE — 82140 ASSAY OF AMMONIA: CPT

## 2019-02-23 LAB — AFP-TUMOR MARKER: 4.6 UG/L

## 2019-02-27 ENCOUNTER — TELEPHONE (OUTPATIENT)
Dept: NEPHROLOGY | Age: 39
End: 2019-02-27

## 2019-02-27 ENCOUNTER — HOSPITAL ENCOUNTER (OUTPATIENT)
Age: 39
Discharge: HOME | End: 2019-02-27
Payer: MEDICARE

## 2019-02-27 ENCOUNTER — OFFICE VISIT (OUTPATIENT)
Dept: NEPHROLOGY | Age: 39
End: 2019-02-27
Payer: MEDICARE

## 2019-02-27 VITALS
SYSTOLIC BLOOD PRESSURE: 115 MMHG | BODY MASS INDEX: 21.18 KG/M2 | WEIGHT: 123.4 LBS | DIASTOLIC BLOOD PRESSURE: 68 MMHG | OXYGEN SATURATION: 98 % | HEART RATE: 115 BPM

## 2019-02-27 DIAGNOSIS — E87.6 HYPOKALEMIA: ICD-10-CM

## 2019-02-27 DIAGNOSIS — E87.1 HYPONATREMIA: ICD-10-CM

## 2019-02-27 LAB
MAGNESIUM: 1.7 MG/DL (ref 1.6–2.4)
POTASSIUM SERPL-SCNC: 3 MEQ/L (ref 3.5–5.2)

## 2019-02-27 PROCEDURE — 1036F TOBACCO NON-USER: CPT | Performed by: INTERNAL MEDICINE

## 2019-02-27 PROCEDURE — 84132 ASSAY OF SERUM POTASSIUM: CPT

## 2019-02-27 PROCEDURE — G8482 FLU IMMUNIZE ORDER/ADMIN: HCPCS | Performed by: INTERNAL MEDICINE

## 2019-02-27 PROCEDURE — G8427 DOCREV CUR MEDS BY ELIG CLIN: HCPCS | Performed by: INTERNAL MEDICINE

## 2019-02-27 PROCEDURE — 1111F DSCHRG MED/CURRENT MED MERGE: CPT | Performed by: INTERNAL MEDICINE

## 2019-02-27 PROCEDURE — 36415 COLL VENOUS BLD VENIPUNCTURE: CPT

## 2019-02-27 PROCEDURE — 83735 ASSAY OF MAGNESIUM: CPT

## 2019-02-27 PROCEDURE — G8420 CALC BMI NORM PARAMETERS: HCPCS | Performed by: INTERNAL MEDICINE

## 2019-02-27 PROCEDURE — 99214 OFFICE O/P EST MOD 30 MIN: CPT | Performed by: INTERNAL MEDICINE

## 2019-02-27 RX ORDER — CEPHALEXIN 500 MG/1
500 CAPSULE ORAL 2 TIMES DAILY
COMMUNITY
Start: 2019-02-21 | End: 2019-04-05

## 2019-02-27 RX ORDER — LACTULOSE 10 G/10G
10 SOLUTION ORAL 3 TIMES DAILY
COMMUNITY

## 2019-02-27 RX ORDER — CYANOCOBALAMIN 1000 UG/ML
1000 INJECTION INTRAMUSCULAR; SUBCUTANEOUS
COMMUNITY

## 2019-02-27 RX ORDER — POTASSIUM CHLORIDE 1500 MG/1
40 TABLET, FILM COATED, EXTENDED RELEASE ORAL 2 TIMES DAILY
Qty: 60 TABLET | Refills: 3 | Status: SHIPPED | OUTPATIENT
Start: 2019-02-27 | End: 2019-05-22

## 2019-02-28 DIAGNOSIS — E87.6 HYPOKALEMIA: Primary | ICD-10-CM

## 2019-02-28 DIAGNOSIS — E83.42 HYPOMAGNESEMIA: ICD-10-CM

## 2019-02-28 RX ORDER — BACLOFEN 20 MG
500 TABLET ORAL 2 TIMES DAILY
Qty: 60 TABLET | Refills: 1 | Status: SHIPPED | OUTPATIENT
Start: 2019-02-28

## 2019-02-28 RX ORDER — THIAMINE HCL 100 MG
500 TABLET ORAL 2 TIMES DAILY
COMMUNITY
End: 2019-02-28 | Stop reason: CLARIF

## 2019-02-28 RX ORDER — BACLOFEN 20 MG
5000 TABLET ORAL 2 TIMES DAILY
COMMUNITY
End: 2019-02-28 | Stop reason: SDUPTHER

## 2019-03-03 ENCOUNTER — HOSPITAL ENCOUNTER (EMERGENCY)
Age: 39
Discharge: HOME | End: 2019-03-03
Attending: EMERGENCY MEDICINE
Payer: MEDICARE

## 2019-03-03 VITALS
SYSTOLIC BLOOD PRESSURE: 118 MMHG | OXYGEN SATURATION: 99 % | DIASTOLIC BLOOD PRESSURE: 78 MMHG | HEART RATE: 98 BPM | TEMPERATURE: 99 F | RESPIRATION RATE: 16 BRPM

## 2019-03-03 DIAGNOSIS — E83.42 HYPOMAGNESEMIA: ICD-10-CM

## 2019-03-03 DIAGNOSIS — M62.838 SPASM OF MUSCLE: Primary | ICD-10-CM

## 2019-03-03 DIAGNOSIS — E87.6 HYPOKALEMIA: ICD-10-CM

## 2019-03-03 LAB
ALBUMIN SERPL-MCNC: 2.2 GM/DL (ref 3.4–5)
ALP BLD-CCNC: 127 U/L (ref 46–116)
ALT SERPL-CCNC: 56 U/L (ref 14–63)
ANION GAP: 7 MEQ/L (ref 8–16)
AST SERPL-CCNC: 85 U/L (ref 15–37)
BASOPHILS # BLD: 0.5 % (ref 0–3)
BILIRUB SERPL-MCNC: 5.8 MG/DL (ref 0.2–1)
BUN BLDV-MCNC: 10 MG/DL (ref 7–18)
CHLORIDE BLD-SCNC: 96 MEQ/L (ref 98–107)
CO2: 32 MEQ/L (ref 21–32)
CREAT SERPL-MCNC: 0.9 MG/DL (ref 0.6–1.3)
EOSINOPHILS RELATIVE PERCENT: 1.3 % (ref 0–4)
GFR, ESTIMATED: 75 ML/MIN/1.73M2
GLUCOSE BLD-MCNC: 90 MG/DL (ref 74–106)
HCT VFR BLD CALC: 28.7 % (ref 37–47)
HEMOGLOBIN: 10 GM/DL (ref 12–16)
LYMPHOCYTES # BLD: 15.5 % (ref 15–47)
MAGNESIUM: 1.5 MG/DL (ref 1.8–2.4)
MCH RBC QN AUTO: 33.1 PG (ref 27–31)
MCHC RBC AUTO-ENTMCNC: 34.8 GM/DL (ref 33–37)
MCV RBC AUTO: 95 FL (ref 81–99)
MONOCYTES: 8 % (ref 0–12)
PDW BLD-RTO: 13 % (ref 11.5–14.5)
PLATELET # BLD: 155 THOU/MM3 (ref 130–400)
PMV BLD AUTO: 8.3 FL (ref 7.4–10.4)
POC CALCIUM: 8.7 MG/DL (ref 8.5–10.1)
POTASSIUM SERPL-SCNC: 3.3 MEQ/L (ref 3.5–5.1)
RBC # BLD: 3.02 MILL/MM3 (ref 4.2–5.4)
SEGS: 74.7 % (ref 43–75)
SODIUM BLD-SCNC: 135 MEQ/L (ref 136–145)
TOTAL PROTEIN: 5.8 GM/DL (ref 6.4–8.2)
WBC # BLD: 6.5 THOU/MM3 (ref 4.8–10.8)

## 2019-03-03 PROCEDURE — 80053 COMPREHEN METABOLIC PANEL: CPT

## 2019-03-03 PROCEDURE — 83735 ASSAY OF MAGNESIUM: CPT

## 2019-03-03 PROCEDURE — 2709999900 HC NON-CHARGEABLE SUPPLY

## 2019-03-03 PROCEDURE — 6360000002 HC RX W HCPCS: Performed by: EMERGENCY MEDICINE

## 2019-03-03 PROCEDURE — 36415 COLL VENOUS BLD VENIPUNCTURE: CPT

## 2019-03-03 PROCEDURE — 85025 COMPLETE CBC W/AUTO DIFF WBC: CPT

## 2019-03-03 PROCEDURE — 6370000000 HC RX 637 (ALT 250 FOR IP): Performed by: EMERGENCY MEDICINE

## 2019-03-03 PROCEDURE — 96361 HYDRATE IV INFUSION ADD-ON: CPT

## 2019-03-03 PROCEDURE — 99284 EMERGENCY DEPT VISIT MOD MDM: CPT

## 2019-03-03 PROCEDURE — 96375 TX/PRO/DX INJ NEW DRUG ADDON: CPT

## 2019-03-03 PROCEDURE — 96365 THER/PROPH/DIAG IV INF INIT: CPT

## 2019-03-03 PROCEDURE — 2580000003 HC RX 258: Performed by: EMERGENCY MEDICINE

## 2019-03-03 RX ORDER — 0.9 % SODIUM CHLORIDE 0.9 %
500 INTRAVENOUS SOLUTION INTRAVENOUS ONCE
Status: COMPLETED | OUTPATIENT
Start: 2019-03-03 | End: 2019-03-03

## 2019-03-03 RX ORDER — TRAZODONE HYDROCHLORIDE 50 MG/1
50 TABLET ORAL DAILY
Qty: 30 TABLET | Refills: 0 | Status: SHIPPED | OUTPATIENT
Start: 2019-03-03

## 2019-03-03 RX ORDER — POTASSIUM CHLORIDE 7.45 MG/ML
INJECTION INTRAVENOUS
Status: DISCONTINUED
Start: 2019-03-03 | End: 2019-03-04 | Stop reason: HOSPADM

## 2019-03-03 RX ORDER — POTASSIUM CHLORIDE 7.45 MG/ML
10 INJECTION INTRAVENOUS ONCE
Status: COMPLETED | OUTPATIENT
Start: 2019-03-03 | End: 2019-03-03

## 2019-03-03 RX ORDER — POTASSIUM CHLORIDE 750 MG/1
20 TABLET, FILM COATED, EXTENDED RELEASE ORAL ONCE
Status: COMPLETED | OUTPATIENT
Start: 2019-03-03 | End: 2019-03-03

## 2019-03-03 RX ADMIN — HYDROMORPHONE HYDROCHLORIDE 0.5 MG: 1 INJECTION, SOLUTION INTRAMUSCULAR; INTRAVENOUS; SUBCUTANEOUS at 21:26

## 2019-03-03 RX ADMIN — SODIUM CHLORIDE 500 ML: 9 INJECTION, SOLUTION INTRAVENOUS at 21:26

## 2019-03-03 RX ADMIN — POTASSIUM CHLORIDE 20 MEQ: 750 TABLET, EXTENDED RELEASE ORAL at 22:32

## 2019-03-03 RX ADMIN — POTASSIUM CHLORIDE 10 MEQ: 7.46 INJECTION, SOLUTION INTRAVENOUS at 22:32

## 2019-03-03 ASSESSMENT — PAIN DESCRIPTION - PAIN TYPE: TYPE: ACUTE PAIN;CHRONIC PAIN

## 2019-03-03 ASSESSMENT — PAIN SCALES - GENERAL
PAINLEVEL_OUTOF10: 9
PAINLEVEL_OUTOF10: 9

## 2019-03-03 ASSESSMENT — PAIN DESCRIPTION - LOCATION: LOCATION: LEG

## 2019-03-04 ASSESSMENT — ENCOUNTER SYMPTOMS
EYES NEGATIVE: 1
GASTROINTESTINAL NEGATIVE: 1
RESPIRATORY NEGATIVE: 1

## 2019-03-05 ENCOUNTER — HOSPITAL ENCOUNTER (OUTPATIENT)
Age: 39
Discharge: HOME | End: 2019-03-05
Payer: MEDICARE

## 2019-03-05 LAB
AMMONIA: 26 UMOL/L (ref 11–60)
AMORPHOUS: ABNORMAL
BACTERIA: ABNORMAL
BILIRUBIN URINE: ABNORMAL
BLOOD, URINE: NEGATIVE
CASTS 2: ABNORMAL /LPF
CASTS UA: ABNORMAL /LPF
CHARACTER, URINE: ABNORMAL
COLOR: ABNORMAL
CRYSTALS, UA: ABNORMAL
EPITHELIAL CELLS, UA: ABNORMAL /HPF
GLUCOSE, URINE: NEGATIVE MG/DL
ICTOTEST: POSITIVE
KETONES, URINE: ABNORMAL
LEUKOCYTE ESTERASE, URINE: ABNORMAL
MAGNESIUM: 1.7 MG/DL (ref 1.6–2.4)
MUCUS: ABNORMAL
NITRITE, URINE: NEGATIVE
PH UA: 6 (ref 5–9)
POTASSIUM SERPL-SCNC: 4.3 MEQ/L (ref 3.5–5.2)
PROTEIN UA: NEGATIVE MG/DL
RBC UA: ABNORMAL /HPF
REFLEX TO URINE C & S: ABNORMAL
SPECIFIC GRAVITY UA: 1.02 (ref 1–1.03)
UROBILINOGEN, URINE: 2 EU/DL (ref 0–1)
WBC UA: ABNORMAL /HPF

## 2019-03-05 PROCEDURE — 81001 URINALYSIS AUTO W/SCOPE: CPT

## 2019-03-05 PROCEDURE — 83735 ASSAY OF MAGNESIUM: CPT

## 2019-03-05 PROCEDURE — 87077 CULTURE AEROBIC IDENTIFY: CPT

## 2019-03-05 PROCEDURE — 80307 DRUG TEST PRSMV CHEM ANLYZR: CPT

## 2019-03-05 PROCEDURE — G0480 DRUG TEST DEF 1-7 CLASSES: HCPCS

## 2019-03-05 PROCEDURE — 84425 ASSAY OF VITAMIN B-1: CPT

## 2019-03-05 PROCEDURE — 82140 ASSAY OF AMMONIA: CPT

## 2019-03-05 PROCEDURE — 36415 COLL VENOUS BLD VENIPUNCTURE: CPT

## 2019-03-05 PROCEDURE — 84132 ASSAY OF SERUM POTASSIUM: CPT

## 2019-03-05 PROCEDURE — 87086 URINE CULTURE/COLONY COUNT: CPT

## 2019-03-06 LAB
ORGANISM: ABNORMAL
URINE CULTURE REFLEX: ABNORMAL

## 2019-03-09 LAB
URINE DRUG PROFILE: NORMAL
VITAMIN B1 WHOLE BLOOD: 79 NMOL/L (ref 70–180)

## 2019-03-18 ENCOUNTER — HOSPITAL ENCOUNTER (OUTPATIENT)
Age: 39
Discharge: HOME | End: 2019-03-18
Payer: MEDICARE

## 2019-03-18 LAB
ANION GAP SERPL CALCULATED.3IONS-SCNC: 12 MEQ/L (ref 8–16)
BUN BLDV-MCNC: 6 MG/DL (ref 7–22)
CALCIUM SERPL-MCNC: 8 MG/DL (ref 8.5–10.5)
CHLORIDE BLD-SCNC: 102 MEQ/L (ref 98–111)
CO2: 25 MEQ/L (ref 23–33)
CREAT SERPL-MCNC: 0.4 MG/DL (ref 0.4–1.2)
GFR SERPL CREATININE-BSD FRML MDRD: > 90 ML/MIN/1.73M2
GLUCOSE BLD-MCNC: 70 MG/DL (ref 70–108)
POTASSIUM SERPL-SCNC: 3.2 MEQ/L (ref 3.5–5.2)
SODIUM BLD-SCNC: 139 MEQ/L (ref 135–145)

## 2019-03-18 PROCEDURE — 36415 COLL VENOUS BLD VENIPUNCTURE: CPT

## 2019-03-18 PROCEDURE — 80048 BASIC METABOLIC PNL TOTAL CA: CPT

## 2019-03-24 ENCOUNTER — HOSPITAL ENCOUNTER (OUTPATIENT)
Age: 39
Discharge: HOME | End: 2019-03-24
Payer: MEDICARE

## 2019-03-24 LAB
ALBUMIN SERPL-MCNC: 2.9 G/DL (ref 3.5–5.1)
ALP BLD-CCNC: 121 U/L (ref 38–126)
ALT SERPL-CCNC: 34 U/L (ref 11–66)
AMORPHOUS: ABNORMAL
ANION GAP SERPL CALCULATED.3IONS-SCNC: 8 MEQ/L (ref 8–16)
AST SERPL-CCNC: 74 U/L (ref 5–40)
BACTERIA: ABNORMAL
BILIRUB SERPL-MCNC: 2.8 MG/DL (ref 0.3–1.2)
BILIRUBIN URINE: ABNORMAL
BLOOD, URINE: NEGATIVE
BUN BLDV-MCNC: 7 MG/DL (ref 7–22)
CALCIUM SERPL-MCNC: 8.5 MG/DL (ref 8.5–10.5)
CASTS UA: ABNORMAL /LPF
CHARACTER, URINE: CLEAR
CHLORIDE BLD-SCNC: 103 MEQ/L (ref 98–111)
CO2: 26 MEQ/L (ref 23–33)
COLOR: ABNORMAL
CREAT SERPL-MCNC: 0.5 MG/DL (ref 0.4–1.2)
CRYSTALS, UA: ABNORMAL
EPITHELIAL CELLS, UA: ABNORMAL /HPF
GFR SERPL CREATININE-BSD FRML MDRD: > 90 ML/MIN/1.73M2
GLUCOSE BLD-MCNC: 86 MG/DL (ref 70–108)
GLUCOSE, URINE: NEGATIVE MG/DL
ICTOTEST: POSITIVE
KETONES, URINE: ABNORMAL
LEUKOCYTE ESTERASE, URINE: NEGATIVE
MUCUS: ABNORMAL
NITRITE, URINE: NEGATIVE
PH UA: 7 (ref 5–9)
POTASSIUM SERPL-SCNC: 4.2 MEQ/L (ref 3.5–5.2)
PROTEIN UA: NEGATIVE MG/DL
RBC UA: ABNORMAL /HPF
REFLEX TO URINE C & S: ABNORMAL
SODIUM BLD-SCNC: 137 MEQ/L (ref 135–145)
SPECIFIC GRAVITY UA: 1.02 (ref 1–1.03)
TOTAL PROTEIN: 6.7 G/DL (ref 6.1–8)
TSH SERPL DL<=0.05 MIU/L-ACNC: 2.49 UIU/ML (ref 0.4–4.2)
UROBILINOGEN, URINE: 1 EU/DL (ref 0–1)
WBC UA: ABNORMAL /HPF

## 2019-03-24 PROCEDURE — 81001 URINALYSIS AUTO W/SCOPE: CPT

## 2019-03-24 PROCEDURE — 36415 COLL VENOUS BLD VENIPUNCTURE: CPT

## 2019-03-24 PROCEDURE — 84443 ASSAY THYROID STIM HORMONE: CPT

## 2019-03-24 PROCEDURE — 80053 COMPREHEN METABOLIC PANEL: CPT

## 2019-04-05 ENCOUNTER — HOSPITAL ENCOUNTER (EMERGENCY)
Age: 39
Discharge: HOME | End: 2019-04-05
Attending: FAMILY MEDICINE
Payer: MEDICARE

## 2019-04-05 VITALS
HEART RATE: 88 BPM | OXYGEN SATURATION: 98 % | DIASTOLIC BLOOD PRESSURE: 67 MMHG | SYSTOLIC BLOOD PRESSURE: 112 MMHG | RESPIRATION RATE: 18 BRPM | TEMPERATURE: 98.1 F

## 2019-04-05 DIAGNOSIS — R10.84 GENERALIZED ABDOMINAL PAIN: Primary | ICD-10-CM

## 2019-04-05 DIAGNOSIS — K72.10 END STAGE LIVER DISEASE (HCC): ICD-10-CM

## 2019-04-05 DIAGNOSIS — E87.6 HYPOKALEMIA: ICD-10-CM

## 2019-04-05 LAB
ALBUMIN SERPL-MCNC: 2.6 GM/DL (ref 3.4–5)
ALP BLD-CCNC: 122 U/L (ref 46–116)
ALT SERPL-CCNC: 45 U/L (ref 14–63)
AMMONIA: 37 UMOL/L (ref 11–60)
ANION GAP: 5 MEQ/L (ref 8–16)
AST SERPL-CCNC: 75 U/L (ref 15–37)
BASOPHILS # BLD: 0.4 % (ref 0–3)
BILIRUB SERPL-MCNC: 2.2 MG/DL (ref 0.2–1)
BILIRUBIN URINE: NEGATIVE
BLOOD, URINE: NEGATIVE
BUN BLDV-MCNC: 10 MG/DL (ref 7–18)
CHARACTER, URINE: CLEAR
CHLORIDE BLD-SCNC: 98 MEQ/L (ref 98–107)
CO2: 34 MEQ/L (ref 21–32)
COLOR: YELLOW
CREAT SERPL-MCNC: 0.9 MG/DL (ref 0.6–1.3)
EOSINOPHILS RELATIVE PERCENT: 12.3 % (ref 0–4)
GFR, ESTIMATED: 74 ML/MIN/1.73M2
GLUCOSE BLD-MCNC: 143 MG/DL (ref 74–106)
GLUCOSE, URINE: NEGATIVE MG/DL
HCT VFR BLD CALC: 31.1 % (ref 37–47)
HEMOGLOBIN: 10.2 GM/DL (ref 12–16)
INR BLD: 1.85 (ref 0.85–1.13)
KETONES, URINE: NEGATIVE
LEUKOCYTE ESTERASE, URINE: NEGATIVE
LIPASE: 91 U/L (ref 73–393)
LYMPHOCYTES # BLD: 45.1 % (ref 15–47)
MCH RBC QN AUTO: 27.2 PG (ref 27–31)
MCHC RBC AUTO-ENTMCNC: 32.8 GM/DL (ref 33–37)
MCV RBC AUTO: 82.9 FL (ref 81–99)
MONOCYTES: 7.6 % (ref 0–12)
NITRITE, URINE: NEGATIVE
PDW BLD-RTO: 14.1 % (ref 11.5–14.5)
PH UA: 7 (ref 5–9)
PLATELET # BLD: 220 THOU/MM3 (ref 130–400)
PMV BLD AUTO: 7.9 FL (ref 7.4–10.4)
POC CALCIUM: 9.1 MG/DL (ref 8.5–10.1)
POTASSIUM SERPL-SCNC: 3.1 MEQ/L (ref 3.5–5.1)
PREGNANCY, URINE: NEGATIVE
PROTEIN UA: NEGATIVE MG/DL
RBC # BLD: 3.75 MILL/MM3 (ref 4.2–5.4)
REFLEX TO URINE C & S: NORMAL
SEGS: 34.6 % (ref 43–75)
SODIUM BLD-SCNC: 137 MEQ/L (ref 136–145)
SPECIFIC GRAVITY UA: 1.01 (ref 1–1.03)
TOTAL PROTEIN: 7 GM/DL (ref 6.4–8.2)
UROBILINOGEN, URINE: 0.2 EU/DL (ref 0–1)
WBC # BLD: 4.8 THOU/MM3 (ref 4.8–10.8)

## 2019-04-05 PROCEDURE — 85025 COMPLETE CBC W/AUTO DIFF WBC: CPT

## 2019-04-05 PROCEDURE — 6360000002 HC RX W HCPCS

## 2019-04-05 PROCEDURE — 99284 EMERGENCY DEPT VISIT MOD MDM: CPT

## 2019-04-05 PROCEDURE — 80053 COMPREHEN METABOLIC PANEL: CPT

## 2019-04-05 PROCEDURE — 6370000000 HC RX 637 (ALT 250 FOR IP): Performed by: FAMILY MEDICINE

## 2019-04-05 PROCEDURE — 6370000000 HC RX 637 (ALT 250 FOR IP): Performed by: EMERGENCY MEDICINE

## 2019-04-05 PROCEDURE — 85610 PROTHROMBIN TIME: CPT

## 2019-04-05 PROCEDURE — 81001 URINALYSIS AUTO W/SCOPE: CPT

## 2019-04-05 PROCEDURE — 96365 THER/PROPH/DIAG IV INF INIT: CPT

## 2019-04-05 PROCEDURE — 2580000003 HC RX 258: Performed by: FAMILY MEDICINE

## 2019-04-05 PROCEDURE — 96375 TX/PRO/DX INJ NEW DRUG ADDON: CPT

## 2019-04-05 PROCEDURE — 6360000002 HC RX W HCPCS: Performed by: FAMILY MEDICINE

## 2019-04-05 PROCEDURE — 2709999900 HC NON-CHARGEABLE SUPPLY

## 2019-04-05 PROCEDURE — 36415 COLL VENOUS BLD VENIPUNCTURE: CPT

## 2019-04-05 PROCEDURE — 83690 ASSAY OF LIPASE: CPT

## 2019-04-05 PROCEDURE — 84703 CHORIONIC GONADOTROPIN ASSAY: CPT

## 2019-04-05 PROCEDURE — 82140 ASSAY OF AMMONIA: CPT

## 2019-04-05 RX ORDER — POTASSIUM CHLORIDE 7.45 MG/ML
INJECTION INTRAVENOUS
Status: COMPLETED
Start: 2019-04-05 | End: 2019-04-05

## 2019-04-05 RX ORDER — POTASSIUM CHLORIDE 750 MG/1
40 TABLET, FILM COATED, EXTENDED RELEASE ORAL ONCE
Status: COMPLETED | OUTPATIENT
Start: 2019-04-05 | End: 2019-04-05

## 2019-04-05 RX ORDER — POTASSIUM CHLORIDE 7.45 MG/ML
10 INJECTION INTRAVENOUS ONCE
Status: COMPLETED | OUTPATIENT
Start: 2019-04-05 | End: 2019-04-05

## 2019-04-05 RX ORDER — 0.9 % SODIUM CHLORIDE 0.9 %
500 INTRAVENOUS SOLUTION INTRAVENOUS ONCE
Status: COMPLETED | OUTPATIENT
Start: 2019-04-05 | End: 2019-04-05

## 2019-04-05 RX ORDER — ONDANSETRON 2 MG/ML
4 INJECTION INTRAMUSCULAR; INTRAVENOUS ONCE
Status: COMPLETED | OUTPATIENT
Start: 2019-04-05 | End: 2019-04-05

## 2019-04-05 RX ADMIN — POTASSIUM CHLORIDE 40 MEQ: 750 TABLET, EXTENDED RELEASE ORAL at 10:47

## 2019-04-05 RX ADMIN — HYDROMORPHONE HYDROCHLORIDE 1 MG: 1 INJECTION, SOLUTION INTRAMUSCULAR; INTRAVENOUS; SUBCUTANEOUS at 08:48

## 2019-04-05 RX ADMIN — SODIUM CHLORIDE: 9 INJECTION, SOLUTION INTRAVENOUS at 08:48

## 2019-04-05 RX ADMIN — POTASSIUM CHLORIDE 10 MEQ: 7.45 INJECTION INTRAVENOUS at 10:50

## 2019-04-05 RX ADMIN — LIDOCAINE HYDROCHLORIDE: 20 SOLUTION ORAL; TOPICAL at 08:34

## 2019-04-05 RX ADMIN — ONDANSETRON 4 MG: 2 INJECTION INTRAMUSCULAR; INTRAVENOUS at 08:47

## 2019-04-05 RX ADMIN — POTASSIUM CHLORIDE 10 MEQ: 7.46 INJECTION, SOLUTION INTRAVENOUS at 10:50

## 2019-04-05 ASSESSMENT — PAIN DESCRIPTION - ORIENTATION: ORIENTATION: UPPER;MID

## 2019-04-05 ASSESSMENT — ENCOUNTER SYMPTOMS
PHOTOPHOBIA: 0
COLOR CHANGE: 0
WHEEZING: 0
CHEST TIGHTNESS: 0
FACIAL SWELLING: 0
CONSTIPATION: 0
ABDOMINAL PAIN: 1
VOICE CHANGE: 0
RHINORRHEA: 0
NAUSEA: 1
STRIDOR: 0
DIARRHEA: 0
ABDOMINAL DISTENTION: 0
EYE PAIN: 0
VOMITING: 1
EYE DISCHARGE: 0
COUGH: 0
SORE THROAT: 0
EYE REDNESS: 0
BACK PAIN: 0
SHORTNESS OF BREATH: 0

## 2019-04-05 ASSESSMENT — PAIN SCALES - GENERAL
PAINLEVEL_OUTOF10: 6
PAINLEVEL_OUTOF10: 6

## 2019-04-05 ASSESSMENT — PAIN DESCRIPTION - LOCATION: LOCATION: ABDOMEN

## 2019-04-05 NOTE — ED NOTES
When asked if she feels better, she states \"I think\". States that she is ready to go home.      Kalpana Jaeger RN  04/05/19 [id number]

## 2019-04-05 NOTE — ED NOTES
Ambulated to bathroom. Steady on feet. Urine specimen obtained and to lab.      Norman Das RN  04/05/19 [id number]

## 2019-04-05 NOTE — LETTER
Gianna Au  [address] [address]  Phone: [phone]             April 5, 2019    Patient: Magdalena Enriquez   YOB: 1980   Date of Visit: 4/5/2019       To Whom It May Concern:    Gretchen Rodrigez was seen and treated in our emergency department on 4/5/2019. She was accompanied by her  who is attendance the entire visit.       Sincerely,             Signature:__________________________________

## 2019-04-05 NOTE — ED NOTES
Discharge instructions reviewed with patient and spouse, questions answered. Skin warm and dry, color normal for ethnicity. Remains alert and cooperative. Spouse here to take patient home. Denies further questions or concerns at this time.      No Goldstein RN  04/05/19 [id number]
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
## 2019-04-05 NOTE — ED NOTES
Patient needs a liver transplant. Presents with complaints of being tired of not feeling well. Has had nausea, upper epigastric pain, anorexia, fatigue, headaches, generalized malaise and weight lose. Did see her specialist at the end of March in [place]. Color pale, skin warm and dry. Alert and cooperative.        Hina Forde RN  04/05/19 9084

## 2019-04-12 ENCOUNTER — HOSPITAL ENCOUNTER (OUTPATIENT)
Age: 39
Discharge: HOME | End: 2019-04-12
Payer: MEDICARE

## 2019-04-12 LAB
ANION GAP SERPL CALCULATED.3IONS-SCNC: 9 MEQ/L (ref 8–16)
BUN BLDV-MCNC: 7 MG/DL (ref 7–22)
CALCIUM SERPL-MCNC: 9.3 MG/DL (ref 8.5–10.5)
CHLORIDE BLD-SCNC: 100 MEQ/L (ref 98–111)
CO2: 27 MEQ/L (ref 23–33)
CREAT SERPL-MCNC: 0.5 MG/DL (ref 0.4–1.2)
GFR SERPL CREATININE-BSD FRML MDRD: > 90 ML/MIN/1.73M2
GLUCOSE BLD-MCNC: 88 MG/DL (ref 70–108)
POTASSIUM SERPL-SCNC: 3.6 MEQ/L (ref 3.5–5.2)
SODIUM BLD-SCNC: 136 MEQ/L (ref 135–145)

## 2019-04-12 PROCEDURE — 80048 BASIC METABOLIC PNL TOTAL CA: CPT

## 2019-04-12 PROCEDURE — 36415 COLL VENOUS BLD VENIPUNCTURE: CPT

## 2019-04-20 ENCOUNTER — HOSPITAL ENCOUNTER (OUTPATIENT)
Age: 39
Discharge: HOME | End: 2019-04-20
Payer: MEDICARE

## 2019-04-20 LAB
ANION GAP SERPL CALCULATED.3IONS-SCNC: 12 MEQ/L (ref 8–16)
BUN BLDV-MCNC: 8 MG/DL (ref 7–22)
CHLORIDE BLD-SCNC: 95 MEQ/L (ref 98–111)
CO2: 30 MEQ/L (ref 23–33)
CREAT SERPL-MCNC: 0.7 MG/DL (ref 0.4–1.2)
GFR SERPL CREATININE-BSD FRML MDRD: > 90 ML/MIN/1.73M2
POTASSIUM SERPL-SCNC: 3.5 MEQ/L (ref 3.5–5.2)
SODIUM BLD-SCNC: 137 MEQ/L (ref 135–145)

## 2019-04-20 PROCEDURE — 82565 ASSAY OF CREATININE: CPT

## 2019-04-20 PROCEDURE — 80051 ELECTROLYTE PANEL: CPT

## 2019-04-20 PROCEDURE — 36415 COLL VENOUS BLD VENIPUNCTURE: CPT

## 2019-04-20 PROCEDURE — 84520 ASSAY OF UREA NITROGEN: CPT

## 2019-04-23 ENCOUNTER — ANESTHESIA (OUTPATIENT)
Dept: ENDOSCOPY | Age: 39
End: 2019-04-23
Payer: MEDICARE

## 2019-04-23 ENCOUNTER — HOSPITAL ENCOUNTER (OUTPATIENT)
Age: 39
Setting detail: OUTPATIENT SURGERY
Discharge: HOME | End: 2019-04-23
Attending: INTERNAL MEDICINE | Admitting: INTERNAL MEDICINE
Payer: MEDICARE

## 2019-04-23 ENCOUNTER — ANESTHESIA EVENT (OUTPATIENT)
Dept: ENDOSCOPY | Age: 39
End: 2019-04-23
Payer: MEDICARE

## 2019-04-23 VITALS
TEMPERATURE: 98 F | RESPIRATION RATE: 12 BRPM | WEIGHT: 118 LBS | OXYGEN SATURATION: 99 % | DIASTOLIC BLOOD PRESSURE: 59 MMHG | BODY MASS INDEX: 19.66 KG/M2 | HEIGHT: 65 IN | HEART RATE: 75 BPM | SYSTOLIC BLOOD PRESSURE: 100 MMHG

## 2019-04-23 VITALS
RESPIRATION RATE: 9 BRPM | SYSTOLIC BLOOD PRESSURE: 114 MMHG | OXYGEN SATURATION: 100 % | DIASTOLIC BLOOD PRESSURE: 59 MMHG

## 2019-04-23 PROCEDURE — 2580000003 HC RX 258: Performed by: INTERNAL MEDICINE

## 2019-04-23 PROCEDURE — 2500000003 HC RX 250 WO HCPCS: Performed by: NURSE ANESTHETIST, CERTIFIED REGISTERED

## 2019-04-23 PROCEDURE — 7100000000 HC PACU RECOVERY - FIRST 15 MIN: Performed by: INTERNAL MEDICINE

## 2019-04-23 PROCEDURE — 3609012400 HC EGD TRANSORAL BIOPSY SINGLE/MULTIPLE: Performed by: INTERNAL MEDICINE

## 2019-04-23 PROCEDURE — 88305 TISSUE EXAM BY PATHOLOGIST: CPT

## 2019-04-23 PROCEDURE — 3700000000 HC ANESTHESIA ATTENDED CARE: Performed by: INTERNAL MEDICINE

## 2019-04-23 PROCEDURE — 7100000001 HC PACU RECOVERY - ADDTL 15 MIN: Performed by: INTERNAL MEDICINE

## 2019-04-23 PROCEDURE — 6360000002 HC RX W HCPCS: Performed by: NURSE ANESTHETIST, CERTIFIED REGISTERED

## 2019-04-23 PROCEDURE — 2709999900 HC NON-CHARGEABLE SUPPLY: Performed by: INTERNAL MEDICINE

## 2019-04-23 RX ORDER — SODIUM CHLORIDE 450 MG/100ML
INJECTION, SOLUTION INTRAVENOUS CONTINUOUS
Status: DISCONTINUED | OUTPATIENT
Start: 2019-04-23 | End: 2019-04-23 | Stop reason: HOSPADM

## 2019-04-23 RX ORDER — PROPOFOL 10 MG/ML
INJECTION, EMULSION INTRAVENOUS PRN
Status: DISCONTINUED | OUTPATIENT
Start: 2019-04-23 | End: 2019-04-23 | Stop reason: SDUPTHER

## 2019-04-23 RX ORDER — LORAZEPAM 0.5 MG/1
0.5 TABLET ORAL EVERY 6 HOURS PRN
COMMUNITY

## 2019-04-23 RX ORDER — LIDOCAINE HYDROCHLORIDE 20 MG/ML
INJECTION, SOLUTION INFILTRATION; PERINEURAL PRN
Status: DISCONTINUED | OUTPATIENT
Start: 2019-04-23 | End: 2019-04-23 | Stop reason: SDUPTHER

## 2019-04-23 RX ADMIN — LIDOCAINE HYDROCHLORIDE 100 MG: 20 INJECTION, SOLUTION INFILTRATION; PERINEURAL at 12:50

## 2019-04-23 RX ADMIN — PROPOFOL 100 MG: 10 INJECTION, EMULSION INTRAVENOUS at 12:50

## 2019-04-23 RX ADMIN — SODIUM CHLORIDE: 4.5 INJECTION, SOLUTION INTRAVENOUS at 11:48

## 2019-04-23 ASSESSMENT — PAIN DESCRIPTION - DESCRIPTORS: DESCRIPTORS: DISCOMFORT

## 2019-04-23 ASSESSMENT — PAIN SCALES - GENERAL: PAINLEVEL_OUTOF10: 2

## 2019-04-23 ASSESSMENT — PAIN - FUNCTIONAL ASSESSMENT: PAIN_FUNCTIONAL_ASSESSMENT: 0-10

## 2019-04-23 ASSESSMENT — PAIN DESCRIPTION - LOCATION: LOCATION: ABDOMEN
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
## 2019-04-23 NOTE — H&P
tablet by mouth daily 3/3/19  Yes Lizy Van MD   Magnesium Oxide 500 MG TABS Take 500 mg by mouth 2 times daily 2/28/19  Yes Tanja Silverio MD   lactulose (CEPHULAC) 10 g packet Take 10 g by mouth 2 times daily as needed   Yes Historical Provider, MD   lidocaine viscous (XYLOCAINE) 2 % solution Take 15 mLs by mouth 3 times daily as needed for Irritation   Yes Historical Provider, MD   potassium chloride (KLOR-CON M) 20 MEQ TBCR extended release tablet Take 2 tablets by mouth 2 times daily 2/27/19  Yes Tanja Silverio MD   bumetanide (BUMEX) 1 MG tablet Take 1 tablet by mouth 2 times daily 2/8/19  Yes Uri Cash MD   spironolactone (ALDACTONE) 50 MG tablet Take 1 tablet by mouth daily 2/9/19  Yes Uri Cash MD   ondansetron (ZOFRAN-ODT) 4 MG disintegrating tablet Take 4 mg by mouth every 12 hours as needed for Nausea or Vomiting   Yes Historical Provider, MD   Pramox-PE-Glycerin-Petrolatum 1-0.25-14.4-15 % CREA rectal cream Place rectally daily as needed (rectal bleeding)   Yes Historical Provider, MD   acetaminophen (TYLENOL) 160 MG/5ML suspension Take 320 mg by mouth every 4 hours as needed for Fever or Pain   Yes Historical Provider, MD   pantoprazole (PROTONIX) 40 MG tablet Take 40 mg by mouth 2 times daily (with meals)   Yes Historical Provider, MD   Multiple Vitamin (MULTIVITAMIN) tablet Take 1 tablet by mouth daily 1/23/19  Yes Joann Wilson MD   midodrine (PROAMATINE) 10 MG tablet Take 1 tablet by mouth 3 times daily (with meals) 1/22/19  Yes Joann Wilson MD   Cholecalciferol (VITAMIN D3) 1000 units TABS Take 1,000 Units by mouth daily    Yes Historical Provider, MD   rifaximin (XIFAXAN) 550 MG tablet Take 550 mg by mouth 2 times daily   Yes Historical Provider, MD   folic acid (FOLVITE) 1 MG tablet Take 1 tablet by mouth daily 2/25/18  Yes Glenis Melissa MD   levothyroxine (SYNTHROID) 88 MCG tablet Take 88 mcg by mouth Daily   Yes Historical Provider, MD   cyanocobalamin 1000 MCG/ML injection Inject 1,000 mcg into the muscle every 30 days    Historical Provider, MD     Additional information:       PHYSICAL:   Heart:  [x]Regular rate and rhythm  []Other:    Lungs:  [x]Clear    []Other:    Abdomen: [x]Soft    []Other:    Mental Status: [x]Alert & Oriented  []Other:      VITAL SIGNS   Patient Vitals for the past 24 hrs:   BP Temp Temp src Pulse Resp SpO2 Height Weight   04/23/19 1130 (!) 102/58 97 °F (36.1 °C) Temporal 75 16 100 % 5' 5\" (1.651 m) 118 lb (53.5 kg)       PLANNED PROCEDURE  [x]EGD  []Colonoscopy []Flex Sigmoid  []ERCP []EUS   []Cystoscopy  [] CATH [] BRONCH   Consent: I have discussed with the patient and/or the patient representative the indication, alternatives, and the possible risks and/or complications of the planned procedure and the anesthesia methods. The patient and/or patient representative appear to understand and agree to proceed. SEDATION  Planned agent:[]Midazolam []Meperidine []Sublimaze []Morphine  []Diazepam [x]Propofol  []Other:     ASA Classification: Class 3 - A patient with severe systemic disease that limits activity but is not incapacitating    Airway Assessment: normal    Monitoring and Safety: The patient will be placed on a cardiac monitor and vital signs, pulse oximetry and level of consciousness will be continuously evaluated throughout the procedure. The patient will be closely monitored until recovery from the medications is complete and the patient has returned to baseline status. Respiratory therapy will be on standby during the procedure. [x]Pre-procedure diagnostic studies complete and results available. Comment:    [x]Previous sedation/anesthesia experiences assessed. Comment:    [x]The patient is an appropriate candidate to undergo the planned procedure sedation and anesthesia.  (Refer to nursing sedation/analgesia documentation record)  [x]Formulation and discussion of sedation/procedure plan, risks, and expectations with patient and/or responsible adult completed. [x]Patient examined immediately prior to the procedure.  (Refer to nursing sedation/analgesia documentation record)    Mendy Rich MD   Electronically signed 4/23/2019 at 12:41 PM

## 2019-04-23 NOTE — POST SEDATION
[address] [address]  Sedation/Analgesia Post Sedation Record    Patient: Ilda Mg : 1980  Med Rec#: [id number] Acc#: [id number]   Procedure Performed By: Sadi Plummer  Primary Care Physician: ALANIS Boggs CNP    POST-PROCEDURE    Physicians/Assistants: Sadi Plummer  Procedure Performed:    Sedation/Anesthesia:     Estimated Blood Loss:          ml  Specimens Removed:  []None [x]Other:      Disposition of Specimen:  [x]Pathology []Other      Complications:   [x]None Immediate []Other:     Post-procedure Diagnosis/Findings:             Recommendations:     MD Chhaya Mason  Electronically signed 2019 at 1:05 PM

## 2019-04-23 NOTE — PROGRESS NOTES
1302 Pt to ENDO PACU awake and alert. Talking. States some bloatiness feeling. 1305 Spouse at bedside. Pt remains awake, no complaints. [address] at bedside speaking to pt and spouse. 1320 Discharge instructions given to pt and spouse. 1322 IV removed. Pt tolerating fluids well'  1330 pt getting dressed , minimal assist, tolerating well. 1333 in wheelchair, transported to vehicle.

## 2019-04-23 NOTE — OP NOTE
[address] [place] [address]                                OPERATIVE REPORT    PATIENT NAME: Melba Gonzalez                       :        1980  MED REC NO:   [id number]                           ROOM:  ACCOUNT NO:   [de-identified]                           ADMIT DATE: 2019  PROVIDER:     HAVEN Holden Dark OF PROCEDURE:  2019    PROCEDURE:  EGD plus biopsy. INDICATIONS FOR THE PROCEDURE:  The patient with a history of cirrhosis,  bariatric surgery done in the past, multiple variceal bleeds, ICU  admissions, and banded multiple times, and I had banded her few months  back, and she is sent here for a followup. The patient has alcoholic  liver disease with advanced cirrhosis and some issues from that. See  the recently dictated note of Blaire Ayers. Also, the patient is  being seen in the [facility], Dr. Rachel Santos, and further workup is being done. See  the preop note for rest of clinicals. ASA CLASSIFICATION:  III. SURGEON:  Brenda Zhong MD.    MEDICATIONS:  Per Anesthesia. BIOPSY:  Yes. PHOTOGRAPH:  Yes. DESCRIPTION OF PROCEDURE:  Informed consent was obtained after  explaining risks and benefits of the procedure and conscious sedation. Possible complications including bleeding, perforation, reaction to  medicine, but not limiting to death, were discussed. Afterwards, GIF-180 gastroscope was advanced through oropharynx,  esophagus, into the small gastric pouch and also into the small bowel. Small bowel mucosa looked healthy and the scope was withdrawn and small  gastric pouch, and I did not do the retroflex exam.  GE junction is  slightly irregular. Small biopsy was done. The patient having very  small varices, no banding was needed, lot of scar tissue from previous  banding. The scope was withdrawn. The patient tolerated the procedure  well. IMPRESSION:  1.   The patient with a history of Mirtazapine 15 mg tablet  Last office visit was 10/4/18  Next office visit is setup for 1/11/19  Last Rx issued on 10/8/18    Ok to refill, if so please sign and close this encounter. cirrhosis and multiple varices that  were ligated in the past, and today it is very small amount of disease  left, no banding was done. 2.  Post bariatric surgery changes. 3.  Irregular GE junction. RECOMMENDATIONS:  The patient is to continue the PPI and the patient may  need some dilation down the road. Right now, I feel that the risk for variceal bleed has gone down. We  will recheck the EGD in six months and we will continue to follow the  patient in the office and we will continue to have a connection of  patient with the Chemo Shankar M.D.    D: 04/23/2019 13:15:01       T: 04/23/2019 13:56:59     [email or web address]  Job#: [id number]     Doc#: [id number]    CC: AL[address] [id number]       Millicent Alfredo M.D.

## 2019-04-23 NOTE — PROGRESS NOTES
Photos taken, biopsy taken with cold forceps, specimen labeled & 1 jar sent to lab. EGD completed, pt tolerated well.

## 2019-05-01 ENCOUNTER — HOSPITAL ENCOUNTER (OUTPATIENT)
Age: 39
Discharge: HOME | End: 2019-05-01
Payer: MEDICARE

## 2019-05-01 LAB
ANION GAP SERPL CALCULATED.3IONS-SCNC: 13 MEQ/L (ref 8–16)
BUN BLDV-MCNC: 5 MG/DL (ref 7–22)
CHLORIDE BLD-SCNC: 96 MEQ/L (ref 98–111)
CO2: 28 MEQ/L (ref 23–33)
CREAT SERPL-MCNC: 0.6 MG/DL (ref 0.4–1.2)
GFR SERPL CREATININE-BSD FRML MDRD: > 90 ML/MIN/1.73M2
POTASSIUM SERPL-SCNC: 2.8 MEQ/L (ref 3.5–5.2)
SODIUM BLD-SCNC: 137 MEQ/L (ref 135–145)

## 2019-05-01 PROCEDURE — 84520 ASSAY OF UREA NITROGEN: CPT

## 2019-05-01 PROCEDURE — 82565 ASSAY OF CREATININE: CPT

## 2019-05-01 PROCEDURE — 80051 ELECTROLYTE PANEL: CPT

## 2019-05-01 PROCEDURE — 36415 COLL VENOUS BLD VENIPUNCTURE: CPT

## 2019-05-08 ENCOUNTER — APPOINTMENT (OUTPATIENT)
Dept: GENERAL RADIOLOGY | Age: 39
End: 2019-05-08
Payer: MEDICARE

## 2019-05-08 ENCOUNTER — HOSPITAL ENCOUNTER (EMERGENCY)
Age: 39
Discharge: HOME | End: 2019-05-08
Attending: EMERGENCY MEDICINE
Payer: MEDICARE

## 2019-05-08 VITALS
DIASTOLIC BLOOD PRESSURE: 69 MMHG | TEMPERATURE: 96.9 F | HEART RATE: 88 BPM | RESPIRATION RATE: 16 BRPM | WEIGHT: 120 LBS | HEIGHT: 64 IN | BODY MASS INDEX: 20.49 KG/M2 | SYSTOLIC BLOOD PRESSURE: 115 MMHG | OXYGEN SATURATION: 100 %

## 2019-05-08 DIAGNOSIS — K59.00 CONSTIPATION, UNSPECIFIED CONSTIPATION TYPE: Primary | ICD-10-CM

## 2019-05-08 DIAGNOSIS — R10.84 GENERALIZED ABDOMINAL PAIN: ICD-10-CM

## 2019-05-08 PROCEDURE — 74018 RADEX ABDOMEN 1 VIEW: CPT

## 2019-05-08 PROCEDURE — 96372 THER/PROPH/DIAG INJ SC/IM: CPT

## 2019-05-08 PROCEDURE — 6360000002 HC RX W HCPCS: Performed by: EMERGENCY MEDICINE

## 2019-05-08 PROCEDURE — 99283 EMERGENCY DEPT VISIT LOW MDM: CPT

## 2019-05-08 RX ORDER — KETOROLAC TROMETHAMINE 30 MG/ML
30 INJECTION, SOLUTION INTRAMUSCULAR; INTRAVENOUS ONCE
Status: COMPLETED | OUTPATIENT
Start: 2019-05-08 | End: 2019-05-08

## 2019-05-08 RX ORDER — POLYETHYLENE GLYCOL 3350 17 G/17G
17 POWDER, FOR SOLUTION ORAL DAILY
Qty: 1 BOTTLE | Refills: 0 | Status: SHIPPED | OUTPATIENT
Start: 2019-05-08 | End: 2019-05-15

## 2019-05-08 RX ADMIN — KETOROLAC TROMETHAMINE 30 MG: 30 INJECTION, SOLUTION INTRAMUSCULAR at 23:07

## 2019-05-08 ASSESSMENT — ENCOUNTER SYMPTOMS
BLOOD IN STOOL: 0
ABDOMINAL PAIN: 1
CONSTIPATION: 1
WHEEZING: 0
SHORTNESS OF BREATH: 0
DIARRHEA: 0
SORE THROAT: 0

## 2019-05-08 ASSESSMENT — PAIN DESCRIPTION - LOCATION: LOCATION: ABDOMEN

## 2019-05-08 ASSESSMENT — PAIN SCALES - GENERAL
PAINLEVEL_OUTOF10: 8
PAINLEVEL_OUTOF10: 8

## 2019-05-09 NOTE — ED NOTES
Pt given discharge instructions and prescription. Verbalized understanding and use of med. Pt left with . Pt in stable condition.      Tammy Adkins RN  05/08/19 5043
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
## 2019-05-09 NOTE — ED TRIAGE NOTES
Pt states she has chronic mid abdominal pain and liver failure. She can not deal with the pain anymore and her primary care provider does not want her to utilize medications due to her decreased liver function.

## 2019-05-10 ENCOUNTER — HOSPITAL ENCOUNTER (OUTPATIENT)
Age: 39
Discharge: HOME | End: 2019-05-10
Payer: MEDICARE

## 2019-05-10 LAB
ALBUMIN SERPL-MCNC: 2.7 GM/DL (ref 3.4–5)
ALP BLD-CCNC: 89 U/L (ref 46–116)
ALT SERPL-CCNC: 46 U/L (ref 14–63)
AMORPHOUS: ABNORMAL
AMYLASE: 44 U/L (ref 25–115)
ANION GAP: 9 MEQ/L (ref 8–16)
AST SERPL-CCNC: 56 U/L (ref 15–37)
BACTERIA: ABNORMAL
BILIRUB SERPL-MCNC: 1.3 MG/DL (ref 0.2–1)
BILIRUBIN URINE: NEGATIVE
BLOOD, URINE: NEGATIVE
BUN BLDV-MCNC: 12 MG/DL (ref 7–18)
CASTS UA: ABNORMAL /LPF
CHARACTER, URINE: CLEAR
CHLORIDE BLD-SCNC: 103 MEQ/L (ref 98–107)
CO2: 30 MEQ/L (ref 21–32)
COLOR: ABNORMAL
CREAT SERPL-MCNC: 0.8 MG/DL (ref 0.6–1.3)
CRYSTALS, UA: ABNORMAL
EPITHELIAL CELLS, UA: ABNORMAL /HPF
GFR, ESTIMATED: 85 ML/MIN/1.73M2
GLUCOSE BLD-MCNC: 131 MG/DL (ref 74–106)
GLUCOSE, URINE: NEGATIVE MG/DL
KETONES, URINE: NEGATIVE
LEUKOCYTE ESTERASE, URINE: NEGATIVE
LIPASE: 104 U/L (ref 73–393)
MUCUS: ABNORMAL
NITRITE, URINE: NEGATIVE
PH UA: 5.5 (ref 5–9)
POC CALCIUM: 8.8 MG/DL (ref 8.5–10.1)
POTASSIUM SERPL-SCNC: 3.3 MEQ/L (ref 3.5–5.1)
PROTEIN UA: NEGATIVE MG/DL
RBC UA: ABNORMAL /HPF
REFLEX TO URINE C & S: ABNORMAL
SODIUM BLD-SCNC: 142 MEQ/L (ref 136–145)
SPECIFIC GRAVITY UA: >= 1.03 (ref 1–1.03)
TOTAL PROTEIN: 6.6 GM/DL (ref 6.4–8.2)
UROBILINOGEN, URINE: 1 EU/DL (ref 0–1)
WBC UA: ABNORMAL /HPF

## 2019-05-10 PROCEDURE — 81001 URINALYSIS AUTO W/SCOPE: CPT

## 2019-05-10 PROCEDURE — 80053 COMPREHEN METABOLIC PANEL: CPT

## 2019-05-10 PROCEDURE — 82150 ASSAY OF AMYLASE: CPT

## 2019-05-10 PROCEDURE — 83690 ASSAY OF LIPASE: CPT

## 2019-05-10 PROCEDURE — 36415 COLL VENOUS BLD VENIPUNCTURE: CPT

## 2019-05-11 ENCOUNTER — HOSPITAL ENCOUNTER (EMERGENCY)
Age: 39
Discharge: HOME | End: 2019-05-11
Attending: EMERGENCY MEDICINE
Payer: MEDICARE

## 2019-05-11 VITALS
OXYGEN SATURATION: 100 % | HEART RATE: 75 BPM | TEMPERATURE: 98.2 F | BODY MASS INDEX: 20.83 KG/M2 | RESPIRATION RATE: 16 BRPM | DIASTOLIC BLOOD PRESSURE: 60 MMHG | SYSTOLIC BLOOD PRESSURE: 112 MMHG | HEIGHT: 65 IN | WEIGHT: 125 LBS

## 2019-05-11 DIAGNOSIS — R10.84 GENERALIZED ABDOMINAL PAIN: Primary | ICD-10-CM

## 2019-05-11 LAB
ALBUMIN SERPL-MCNC: 2.7 GM/DL (ref 3.4–5)
ALP BLD-CCNC: 86 U/L (ref 46–116)
ALT SERPL-CCNC: 42 U/L (ref 14–63)
AMORPHOUS: ABNORMAL
AMYLASE: 44 U/L (ref 25–115)
ANION GAP: 7 MEQ/L (ref 8–16)
AST SERPL-CCNC: 52 U/L (ref 15–37)
BACTERIA: ABNORMAL
BILIRUB SERPL-MCNC: 0.9 MG/DL (ref 0.2–1)
BILIRUBIN URINE: NEGATIVE
BLOOD, URINE: NEGATIVE
BUN BLDV-MCNC: 7 MG/DL (ref 7–18)
CASTS UA: ABNORMAL /LPF
CHARACTER, URINE: CLEAR
CHLORIDE BLD-SCNC: 105 MEQ/L (ref 98–107)
CO2: 30 MEQ/L (ref 21–32)
COLOR: ABNORMAL
CREAT SERPL-MCNC: 0.9 MG/DL (ref 0.6–1.3)
CRYSTALS, UA: ABNORMAL
EPITHELIAL CELLS, UA: ABNORMAL /HPF
GFR, ESTIMATED: 74 ML/MIN/1.73M2
GLUCOSE BLD-MCNC: 88 MG/DL (ref 74–106)
GLUCOSE, URINE: NEGATIVE MG/DL
KETONES, URINE: ABNORMAL
LEUKOCYTE ESTERASE, URINE: NEGATIVE
LIPASE: 87 U/L (ref 73–393)
MAGNESIUM: 1.6 MG/DL (ref 1.8–2.4)
MUCUS: ABNORMAL
NITRITE, URINE: NEGATIVE
PH UA: 6.5 (ref 5–9)
POC CALCIUM: 8.6 MG/DL (ref 8.5–10.1)
POTASSIUM SERPL-SCNC: 3.6 MEQ/L (ref 3.5–5.1)
PROTEIN UA: NEGATIVE MG/DL
RBC UA: ABNORMAL /HPF
REFLEX TO URINE C & S: ABNORMAL
SODIUM BLD-SCNC: 142 MEQ/L (ref 136–145)
SPECIFIC GRAVITY UA: 1.01 (ref 1–1.03)
TOTAL PROTEIN: 6.3 GM/DL (ref 6.4–8.2)
UROBILINOGEN, URINE: 1 EU/DL (ref 0–1)
WBC UA: ABNORMAL /HPF

## 2019-05-11 PROCEDURE — 2580000003 HC RX 258: Performed by: EMERGENCY MEDICINE

## 2019-05-11 PROCEDURE — 83690 ASSAY OF LIPASE: CPT

## 2019-05-11 PROCEDURE — 81001 URINALYSIS AUTO W/SCOPE: CPT

## 2019-05-11 PROCEDURE — 96375 TX/PRO/DX INJ NEW DRUG ADDON: CPT

## 2019-05-11 PROCEDURE — 85025 COMPLETE CBC W/AUTO DIFF WBC: CPT

## 2019-05-11 PROCEDURE — 2709999900 HC NON-CHARGEABLE SUPPLY

## 2019-05-11 PROCEDURE — 36415 COLL VENOUS BLD VENIPUNCTURE: CPT

## 2019-05-11 PROCEDURE — 80053 COMPREHEN METABOLIC PANEL: CPT

## 2019-05-11 PROCEDURE — 83735 ASSAY OF MAGNESIUM: CPT

## 2019-05-11 PROCEDURE — 96374 THER/PROPH/DIAG INJ IV PUSH: CPT

## 2019-05-11 PROCEDURE — 99284 EMERGENCY DEPT VISIT MOD MDM: CPT

## 2019-05-11 PROCEDURE — 6360000002 HC RX W HCPCS: Performed by: EMERGENCY MEDICINE

## 2019-05-11 PROCEDURE — 6370000000 HC RX 637 (ALT 250 FOR IP): Performed by: EMERGENCY MEDICINE

## 2019-05-11 PROCEDURE — 82150 ASSAY OF AMYLASE: CPT

## 2019-05-11 RX ORDER — ONDANSETRON 4 MG/1
4 TABLET, ORALLY DISINTEGRATING ORAL EVERY 8 HOURS PRN
Qty: 12 TABLET | Refills: 0 | Status: SHIPPED | OUTPATIENT
Start: 2019-05-11

## 2019-05-11 RX ORDER — DICYCLOMINE HYDROCHLORIDE 10 MG/1
20 CAPSULE ORAL ONCE
Status: COMPLETED | OUTPATIENT
Start: 2019-05-11 | End: 2019-05-11

## 2019-05-11 RX ORDER — ONDANSETRON 2 MG/ML
4 INJECTION INTRAMUSCULAR; INTRAVENOUS ONCE
Status: COMPLETED | OUTPATIENT
Start: 2019-05-11 | End: 2019-05-11

## 2019-05-11 RX ORDER — DICYCLOMINE HCL 20 MG
20 TABLET ORAL 4 TIMES DAILY
Qty: 20 TABLET | Refills: 0 | Status: SHIPPED | OUTPATIENT
Start: 2019-05-11

## 2019-05-11 RX ORDER — 0.9 % SODIUM CHLORIDE 0.9 %
1000 INTRAVENOUS SOLUTION INTRAVENOUS ONCE
Status: COMPLETED | OUTPATIENT
Start: 2019-05-11 | End: 2019-05-11

## 2019-05-11 RX ADMIN — DICYCLOMINE HYDROCHLORIDE 20 MG: 10 CAPSULE ORAL at 21:37

## 2019-05-11 RX ADMIN — ONDANSETRON 4 MG: 2 INJECTION INTRAMUSCULAR; INTRAVENOUS at 20:52

## 2019-05-11 RX ADMIN — HYDROMORPHONE HYDROCHLORIDE 0.5 MG: 1 INJECTION, SOLUTION INTRAMUSCULAR; INTRAVENOUS; SUBCUTANEOUS at 20:52

## 2019-05-11 RX ADMIN — SODIUM CHLORIDE 1000 ML: 9 INJECTION, SOLUTION INTRAVENOUS at 20:54

## 2019-05-11 ASSESSMENT — PAIN SCALES - GENERAL
PAINLEVEL_OUTOF10: 8
PAINLEVEL_OUTOF10: 8
PAINLEVEL_OUTOF10: 2

## 2019-05-11 ASSESSMENT — PAIN DESCRIPTION - LOCATION
LOCATION: ABDOMEN
LOCATION: ABDOMEN

## 2019-05-12 ASSESSMENT — ENCOUNTER SYMPTOMS
ABDOMINAL PAIN: 1
NAUSEA: 1
RESPIRATORY NEGATIVE: 1
CONSTIPATION: 1
VOMITING: 1
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
## 2019-05-12 NOTE — ED NOTES
Discharge teaching and instructions for condition explained to patient. AVS reviewed. Printed prescriptions given to patient. Patient voiced understanding regarding prescriptions, follow up appointments and care of self at home. Pt discharged to home in stable condition per 's care.      Saurabh Avila RN  05/11/19 1765

## 2019-05-12 NOTE — ED NOTES
Pt presents to the front window with complaints of abdominal pain. Has a history of pancreatitis and is on a transplant list for this. For the past week has been having issues with vomiting and constipation. Has had Xrays done and has seen Jhon Han CNP for testing. She is not feeling any better and is getting upset about feeling awful for the past week. Is wondering if she should not be admitted. Bowel sounds active. Pain is in her upper quadrants of her abdomen.       Shahnaz Kapoor RN  05/11/19 2010

## 2019-05-13 LAB
BASOPHILS # BLD: 0.4 % (ref 0–3)
DIFFERENTIAL, AUTO: ABNORMAL
EOSINOPHILS RELATIVE PERCENT: 6.2 % (ref 0–4)
HCT VFR BLD CALC: 26.5 % (ref 37–47)
HEMOGLOBIN: 8.7 GM/DL (ref 12–16)
LYMPHOCYTES # BLD: 55.7 % (ref 15–47)
MCH RBC QN AUTO: 24.5 PG (ref 27–31)
MCHC RBC AUTO-ENTMCNC: 32.7 GM/DL (ref 33–37)
MCV RBC AUTO: 75 FL (ref 81–99)
MONOCYTES: 9 % (ref 0–12)
PATHOLOGIST REVIEW: ABNORMAL
PDW BLD-RTO: 15.4 % (ref 11.5–14.5)
PLATELET # BLD: 182 THOU/MM3 (ref 130–400)
PLATELET ESTIMATE: ABNORMAL
PMV BLD AUTO: 8.1 FL (ref 7.4–10.4)
RBC # BLD: 3.54 MILL/MM3 (ref 4.2–5.4)
SEGS: 28.7 % (ref 43–75)
WBC # BLD: 4.7 THOU/MM3 (ref 4.8–10.8)

## 2019-05-14 ENCOUNTER — HOSPITAL ENCOUNTER (OUTPATIENT)
Age: 39
Discharge: HOME | End: 2019-05-14
Payer: MEDICARE

## 2019-05-14 DIAGNOSIS — E87.1 HYPONATREMIA: ICD-10-CM

## 2019-05-14 DIAGNOSIS — E87.6 HYPOKALEMIA: ICD-10-CM

## 2019-05-14 LAB
ANION GAP SERPL CALCULATED.3IONS-SCNC: 14 MEQ/L (ref 8–16)
BUN BLDV-MCNC: 10 MG/DL (ref 7–22)
CALCIUM SERPL-MCNC: 9 MG/DL (ref 8.5–10.5)
CHLORIDE BLD-SCNC: 103 MEQ/L (ref 98–111)
CO2: 27 MEQ/L (ref 23–33)
CREAT SERPL-MCNC: 0.7 MG/DL (ref 0.4–1.2)
GFR SERPL CREATININE-BSD FRML MDRD: > 90 ML/MIN/1.73M2
GLUCOSE BLD-MCNC: 126 MG/DL (ref 70–108)
MAGNESIUM: 1.7 MG/DL (ref 1.6–2.4)
POTASSIUM SERPL-SCNC: 3.8 MEQ/L (ref 3.5–5.2)
SODIUM BLD-SCNC: 144 MEQ/L (ref 135–145)

## 2019-05-14 PROCEDURE — 80048 BASIC METABOLIC PNL TOTAL CA: CPT

## 2019-05-14 PROCEDURE — 36415 COLL VENOUS BLD VENIPUNCTURE: CPT

## 2019-05-14 PROCEDURE — 83735 ASSAY OF MAGNESIUM: CPT

## 2019-05-17 ENCOUNTER — HOSPITAL ENCOUNTER (OUTPATIENT)
Age: 39
Discharge: HOME | End: 2019-05-17
Payer: MEDICARE

## 2019-05-17 LAB
BASOPHILS # BLD: 0.8 %
BASOPHILS ABSOLUTE: 0 THOU/MM3 (ref 0–0.1)
EOSINOPHIL # BLD: 5.1 %
EOSINOPHILS ABSOLUTE: 0.2 THOU/MM3 (ref 0–0.4)
ERYTHROCYTE [DISTWIDTH] IN BLOOD BY AUTOMATED COUNT: 17.7 % (ref 11.5–14.5)
ERYTHROCYTE [DISTWIDTH] IN BLOOD BY AUTOMATED COUNT: 51.3 FL (ref 35–45)
HCT VFR BLD CALC: 31.3 % (ref 37–47)
HEMOGLOBIN: 9.3 GM/DL (ref 12–16)
IMMATURE GRANS (ABS): 0 THOU/MM3 (ref 0–0.07)
IMMATURE GRANULOCYTES: 0 %
LYMPHOCYTES # BLD: 44.6 %
LYMPHOCYTES ABSOLUTE: 1.7 THOU/MM3 (ref 1–4.8)
MCH RBC QN AUTO: 23.7 PG (ref 26–33)
MCHC RBC AUTO-ENTMCNC: 29.7 GM/DL (ref 32.2–35.5)
MCV RBC AUTO: 79.8 FL (ref 81–99)
MONOCYTES # BLD: 12.1 %
MONOCYTES ABSOLUTE: 0.4 THOU/MM3 (ref 0.4–1.3)
NUCLEATED RED BLOOD CELLS: 0 /100 WBC
PLATELET # BLD: 205 THOU/MM3 (ref 130–400)
PMV BLD AUTO: 10.9 FL (ref 9.4–12.4)
RBC # BLD: 3.92 MILL/MM3 (ref 4.2–5.4)
SEG NEUTROPHILS: 37.4 %
SEGMENTED NEUTROPHILS ABSOLUTE COUNT: 1.4 THOU/MM3 (ref 1.8–7.7)
WBC # BLD: 3.7 THOU/MM3 (ref 4.8–10.8)

## 2019-05-17 PROCEDURE — 36415 COLL VENOUS BLD VENIPUNCTURE: CPT

## 2019-05-17 PROCEDURE — 85025 COMPLETE CBC W/AUTO DIFF WBC: CPT

## 2019-05-22 ENCOUNTER — OFFICE VISIT (OUTPATIENT)
Dept: NEPHROLOGY | Age: 39
End: 2019-05-22
Payer: MEDICARE

## 2019-05-22 VITALS
SYSTOLIC BLOOD PRESSURE: 84 MMHG | BODY MASS INDEX: 20.47 KG/M2 | DIASTOLIC BLOOD PRESSURE: 58 MMHG | OXYGEN SATURATION: 100 % | HEART RATE: 76 BPM | WEIGHT: 123 LBS

## 2019-05-22 DIAGNOSIS — E87.6 HYPOKALEMIA: ICD-10-CM

## 2019-05-22 DIAGNOSIS — E87.1 HYPONATREMIA: Primary | ICD-10-CM

## 2019-05-22 PROCEDURE — G8428 CUR MEDS NOT DOCUMENT: HCPCS | Performed by: INTERNAL MEDICINE

## 2019-05-22 PROCEDURE — G8420 CALC BMI NORM PARAMETERS: HCPCS | Performed by: INTERNAL MEDICINE

## 2019-05-22 PROCEDURE — 1036F TOBACCO NON-USER: CPT | Performed by: INTERNAL MEDICINE

## 2019-05-22 PROCEDURE — 99213 OFFICE O/P EST LOW 20 MIN: CPT | Performed by: INTERNAL MEDICINE

## 2019-05-22 RX ORDER — POTASSIUM CHLORIDE 20MEQ/15ML
20 LIQUID (ML) ORAL 2 TIMES DAILY
COMMUNITY
Start: 2019-05-21

## 2019-05-22 NOTE — PROGRESS NOTES
Pt has been in [facility] ER a couple times in th last month for abdominal pain. She is getting EGD tomorrow.

## 2019-05-22 NOTE — PROGRESS NOTES
Renal Progress Note    Assessment and Plan:      Diagnosis Orders   1. Hyponatremia     2. Hypokalemia     PLAN:  Reviewed labs with the patient and she undeerstood   Serum sodium is normal at 144 mEq/l  Serum magnesium is normal at 1.7 mg/dl  Decrease bumetanide to 1 mg daily from bid and I believe serum potassium will still be ok at current dose as it is on the low end of normal now   Return visit as needed       Patient Active Problem List   Diagnosis    Pancreatitis    Acute pancreatitis    Anxiety    Acute blood loss anemia    Alcoholic cirrhosis (HCC)    Hypocalcemia    Diarrhea    Bloody stool    Essential hypertension    S/P gastric bypass    Bipolar disorder (HCC)    Esophageal varices (HCC)    Hypothyroidism    Alcoholic cirrhosis of liver without ascites (HCC)    Hematemesis    Moderate malnutrition (HCC)    Hematemesis with nausea    GI bleed    Hypokalemia    Alcohol abuse    Cirrhosis of liver (HCC)    Acute hyponatremia    Hypothyroidism    Leukocytosis    Moderate malnutrition (HCC)    Chronic hypotension    Abdominal pain    Anemia    Hypocalcemia    Elevated transaminase level    Elevated INR    Postoperative pain    Decompensation of cirrhosis of liver (HCC)    Hypervolemia    Leg edema    Abnormal urinalysis    Coagulopathy (HCC)    Bacteriuria    Neck pain    Esophageal pain    Esophageal varices in alcoholic cirrhosis (HCC)    Chronic hyponatremia    Elevated LFTs    Hyperbilirubinemia    Hepatic encephalopathy (HCC)    Metabolic acidosis    Thrombocytopenia (HCC)    History of alcohol abuse    Generalized weakness    Hypokalemia    Hyponatremia       Subjective:   Chief complaint:  Chief Complaint   Patient presents with    Other     Hypokalemia    Other     hypomagnesemia      HPI:This is a follow up visit for Mrs Rodriguez Call here today for a follow up appointment . Sees her for hyponatremia and was last seen about 3 months ago with serum sodium of 140 mEq/l and now 144 mEq/l. Had been to the ED several times since then with epigastric pain with nausea and is scheduled for EGD tomorrow     ROS:Constitutional: positive for anorexia and weight loss  Eyes: negative  Ears, nose, mouth, throat, and face: negative  Respiratory: negative  Cardiovascular: negative  Gastrointestinal: negative  Genitourinary:negative  Integument/breast: negative  Hematologic/lymphatic: negative  Musculoskeletal:negative  Neurological: negative  Behavioral/Psych: negative  Endocrine: negative  Allergic/Immunologic: negative  Medications:     Current Outpatient Medications   Medication Sig Dispense Refill    VENTOLIN  (90 Base) MCG/ACT inhaler   0    potassium chloride 20 MEQ/15ML (10%) oral solution Take 20 mEq by mouth 2 times daily       dicyclomine (BENTYL) 20 MG tablet Take 1 tablet by mouth 4 times daily For abdominal cramping 20 tablet 0    ondansetron (ZOFRAN ODT) 4 MG disintegrating tablet Take 1 tablet by mouth every 8 hours as needed for Nausea or Vomiting 12 tablet 0    LORazepam (ATIVAN) 0.5 MG tablet Take 0.5 mg by mouth every 6 hours as needed for Anxiety.       traZODone (DESYREL) 50 MG tablet Take 1 tablet by mouth daily 30 tablet 0    Magnesium Oxide 500 MG TABS Take 500 mg by mouth 2 times daily 60 tablet 1    cyanocobalamin 1000 MCG/ML injection Inject 1,000 mcg into the muscle every 30 days      lactulose (CEPHULAC) 10 g packet Take 10 g by mouth 3 times daily       lidocaine viscous (XYLOCAINE) 2 % solution Take 15 mLs by mouth 3 times daily as needed for Irritation      bumetanide (BUMEX) 1 MG tablet Take 1 tablet by mouth 2 times daily 30 tablet 3    spironolactone (ALDACTONE) 50 MG tablet Take 1 tablet by mouth daily 30 tablet 3    acetaminophen (TYLENOL) 160 MG/5ML suspension Take 320 mg by mouth every 4 hours as needed for Fever or Pain      pantoprazole (PROTONIX) 40 MG tablet Take 40 mg by mouth 2 times daily (with meals)      Multiple Vitamin (MULTIVITAMIN) tablet Take 1 tablet by mouth daily 30 tablet 3    midodrine (PROAMATINE) 10 MG tablet Take 1 tablet by mouth 3 times daily (with meals) 90 tablet 1    Cholecalciferol (VITAMIN D3) 1000 units TABS Take 1,000 Units by mouth daily       rifaximin (XIFAXAN) 550 MG tablet Take 550 mg by mouth 2 times daily      folic acid (FOLVITE) 1 MG tablet Take 1 tablet by mouth daily 30 tablet 3    levothyroxine (SYNTHROID) 88 MCG tablet Take 88 mcg by mouth Daily      Pramox-PE-Glycerin-Petrolatum 1-0.25-14.4-15 % CREA rectal cream Place rectally daily as needed (rectal bleeding)       No current facility-administered medications for this visit.         Lab Results:    CBC:   Lab Results   Component Value Date    WBC 3.7 (L) 05/17/2019    HGB 9.3 (L) 05/17/2019    HCT 31.3 (L) 05/17/2019    MCV 79.8 (L) 05/17/2019     05/17/2019     BMP:    Lab Results   Component Value Date     05/14/2019     05/11/2019     05/10/2019    K 3.8 05/14/2019    K 3.6 05/11/2019    K 3.3 (L) 05/10/2019     05/14/2019     05/11/2019     05/10/2019    CO2 27 05/14/2019    CO2 30 05/11/2019    CO2 30 05/10/2019    BUN 10 05/14/2019    BUN 7 05/11/2019    BUN 12 05/10/2019    CREATININE 0.7 05/14/2019    CREATININE 0.9 05/11/2019    CREATININE 0.8 05/10/2019    GLUCOSE 126 (H) 05/14/2019    GLUCOSE 88 05/11/2019    GLUCOSE 131 (H) 05/10/2019      Hepatic:   Lab Results   Component Value Date    AST 52 (H) 05/11/2019    AST 56 (H) 05/10/2019    AST 75 (H) 04/05/2019    ALT 42 05/11/2019    ALT 46 05/10/2019    ALT 45 04/05/2019    BILITOT 0.9 05/11/2019    BILITOT 1.3 (H) 05/10/2019    BILITOT 2.2 (H) 04/05/2019    ALKPHOS 86 05/11/2019    ALKPHOS 89 05/10/2019    ALKPHOS 122 (H) 04/05/2019     BNP: No results found for: BNP  Lipids:   Lab Results   Component Value Date    CHOL 127 08/26/2016    HDL 43 08/26/2016     INR:   Lab Results   Component Value Date    INR 1.85 (H)

## 2019-05-23 ENCOUNTER — ANESTHESIA (OUTPATIENT)
Dept: ENDOSCOPY | Age: 39
End: 2019-05-23
Payer: MEDICARE

## 2019-05-23 ENCOUNTER — HOSPITAL ENCOUNTER (OUTPATIENT)
Age: 39
Setting detail: OUTPATIENT SURGERY
Discharge: HOME | End: 2019-05-23
Attending: INTERNAL MEDICINE | Admitting: INTERNAL MEDICINE
Payer: MEDICARE

## 2019-05-23 ENCOUNTER — ANESTHESIA EVENT (OUTPATIENT)
Dept: ENDOSCOPY | Age: 39
End: 2019-05-23
Payer: MEDICARE

## 2019-05-23 VITALS
TEMPERATURE: 98 F | HEIGHT: 65 IN | BODY MASS INDEX: 20.43 KG/M2 | RESPIRATION RATE: 16 BRPM | DIASTOLIC BLOOD PRESSURE: 61 MMHG | SYSTOLIC BLOOD PRESSURE: 98 MMHG | OXYGEN SATURATION: 100 % | HEART RATE: 68 BPM | WEIGHT: 122.6 LBS

## 2019-05-23 VITALS
DIASTOLIC BLOOD PRESSURE: 62 MMHG | SYSTOLIC BLOOD PRESSURE: 98 MMHG | OXYGEN SATURATION: 99 % | RESPIRATION RATE: 10 BRPM

## 2019-05-23 DIAGNOSIS — K70.30 ALCOHOLIC CIRRHOSIS OF LIVER WITHOUT ASCITES (HCC): Primary | ICD-10-CM

## 2019-05-23 PROCEDURE — 3609012800 HC EGD DIAGNOSTIC ONLY: Performed by: INTERNAL MEDICINE

## 2019-05-23 PROCEDURE — 2709999900 HC NON-CHARGEABLE SUPPLY: Performed by: INTERNAL MEDICINE

## 2019-05-23 PROCEDURE — 3700000000 HC ANESTHESIA ATTENDED CARE: Performed by: INTERNAL MEDICINE

## 2019-05-23 PROCEDURE — 6360000002 HC RX W HCPCS: Performed by: NURSE ANESTHETIST, CERTIFIED REGISTERED

## 2019-05-23 PROCEDURE — 2500000003 HC RX 250 WO HCPCS: Performed by: NURSE ANESTHETIST, CERTIFIED REGISTERED

## 2019-05-23 PROCEDURE — 3700000001 HC ADD 15 MINUTES (ANESTHESIA): Performed by: INTERNAL MEDICINE

## 2019-05-23 PROCEDURE — 2580000003 HC RX 258: Performed by: INTERNAL MEDICINE

## 2019-05-23 PROCEDURE — 7100000001 HC PACU RECOVERY - ADDTL 15 MIN: Performed by: INTERNAL MEDICINE

## 2019-05-23 PROCEDURE — 7100000000 HC PACU RECOVERY - FIRST 15 MIN: Performed by: INTERNAL MEDICINE

## 2019-05-23 RX ORDER — SODIUM CHLORIDE 450 MG/100ML
INJECTION, SOLUTION INTRAVENOUS CONTINUOUS
Status: DISCONTINUED | OUTPATIENT
Start: 2019-05-23 | End: 2019-05-23 | Stop reason: HOSPADM

## 2019-05-23 RX ORDER — LIDOCAINE HYDROCHLORIDE 20 MG/ML
INJECTION, SOLUTION INFILTRATION; PERINEURAL PRN
Status: DISCONTINUED | OUTPATIENT
Start: 2019-05-23 | End: 2019-05-23 | Stop reason: SDUPTHER

## 2019-05-23 RX ORDER — PROPOFOL 10 MG/ML
INJECTION, EMULSION INTRAVENOUS PRN
Status: DISCONTINUED | OUTPATIENT
Start: 2019-05-23 | End: 2019-05-23 | Stop reason: SDUPTHER

## 2019-05-23 RX ADMIN — SODIUM CHLORIDE: 4.5 INJECTION, SOLUTION INTRAVENOUS at 09:50

## 2019-05-23 RX ADMIN — PROPOFOL 50 MG: 10 INJECTION, EMULSION INTRAVENOUS at 10:20

## 2019-05-23 RX ADMIN — LIDOCAINE HYDROCHLORIDE 100 MG: 20 INJECTION, SOLUTION INFILTRATION; PERINEURAL at 10:19

## 2019-05-23 RX ADMIN — PROPOFOL 100 MG: 10 INJECTION, EMULSION INTRAVENOUS at 10:19

## 2019-05-23 ASSESSMENT — PAIN - FUNCTIONAL ASSESSMENT: PAIN_FUNCTIONAL_ASSESSMENT: 0-10
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
## 2019-05-23 NOTE — PROGRESS NOTES
1027-Patient to PACU report received from [facility].. Patient awake. C/o pain in stomach. Asked for [employer]. Drink provided. Patient tolerating fluids. 1029-family at bedside. 1034-Patient tolerating fluids. 1035-DrWelton Crease at bedside. Explains POC to patient and family. Patient and family express understanding. 1041-Patient resting quietly in bed. 1108-Patient meets DC criteria. Resting quietly in bed. Discharge instructions reviewed with spouse. IV removed and patient assisted to car via wheelchair.

## 2019-05-23 NOTE — BRIEF OP NOTE
Brief Postoperative Note  ______________________________________________________________    Patient: Parvez Matias  YOB: 1980  MRN: [id number]  Date of Procedure: 5/23/2019    Pre-Op Diagnosis: CIRRHOSIS, ESOPHAGEAL VARICES, ANEMIA    Post-Op Diagnosis: Same       Procedure(s):  EGD DIAGNOSTIC ONLY    Anesthesia: Monitor Anesthesia Care    Surgeon(s):  Bri Jernigan MD    Assistant: none    Estimated Blood Loss (mL): less than 50     Complications: None    Specimens:   * No specimens in log *    Implants:  * No implants in log *      Drains: * No LDAs found *    Findings: normal post gastric bypass + Ishmael-en-Y anatomy    Bri Jernigan MD  Date: 5/23/2019  Time: 10:33 AM

## 2019-05-23 NOTE — ANESTHESIA POSTPROCEDURE EVALUATION
Department of Anesthesiology  Postprocedure Note    Patient: Hailey Fischer  MRN: [id number]  YOB: 1980  Date of evaluation: 5/23/2019  Time:  10:27 AM     Procedure Summary     Date:  05/23/19 [facility] / [facility]    Anesthesia Start:  5972 Anesthesia Stop:  1027    Procedure:  EGD DIAGNOSTIC ONLY (N/A Esophagus) Diagnosis:  (CIRRHOSIS, ESOPHAGEAL VARICES, ANEMIA)    Surgeon:  Yue Treadwell MD Responsible Provider:  Paresh Nichole MD    Anesthesia Type:  MAC ASA Status:  3          Anesthesia Type: MAC    Victoriano Phase I: Victoriano Score: 10    Victoriano Phase II:      Last vitals: Reviewed and per EMR flowsheets.        Anesthesia Post Evaluation    Patient location during evaluation: bedside  Patient participation: complete - patient participated  Level of consciousness: awake and alert  Airway patency: patent  Nausea & Vomiting: no nausea and no vomiting  Complications: no  Cardiovascular status: hemodynamically stable  Respiratory status: acceptable, spontaneous ventilation and room air  Hydration status: euvolemic

## 2019-05-23 NOTE — PROCEDURES
[address] [place] [address]                                 PROCEDURE NOTE    PATIENT NAME: Sheila Toscano                       :        1980  MED REC NO:   [id number]                           ROOM:  ACCOUNT NO:   [de-identified]                           ADMIT DATE: 2019  PROVIDER:     Evie Ann M.D.      Andreia Prajapation:  2019    EGD NOTE    HISTORY:  The patient is a 27-year-old white female with known alcoholic  cirrhosis who has been followed by Dr. Jacque Bernheim and Annabelle Bain CNP  from our office. She has been abstinent from alcohol since 2019. She underwent esophageal variceal banding in January but had an EGD in  April that did not show varices necessitating banding. The patient has  had a three-week history of postprandial abdominal pain that is poorly  characterized. It is made worse by tomato products. She has had nausea  and vomiting. She has not had odynophagia or dysphagia. She has had a  history of a gastric bypass procedure. CBC on 2019 showed hyperchromic and microcytic anemia with white  count 3700, hematocrit 31.3, MCV 79.8, MCH 23.7, MCHC 29.7, platelets  [phone]. CMP on 2019 had showed an albumin of 2.7 but was  otherwise unremarkable. Lipase and amylase were also both normal.    SURGEON:  Jayden Ann MD    SEDATION:  Please see the sedation record of anesthesiology department. DESCRIPTION OF PROCEDURE:  The patient was placed in the left lateral  decubitus position and the Olympus GIF-H190 video endoscope was  introduced. The proximal esophagus appeared normal.  The distal  esophagus showed no evidence of visible esophageal varices. There was a  discoloration of the distal esophagus representing chronic change from  prior variceal banding. However, no active lesions were noted.   The  esophagogastric junction was seen at 39 cm and was examined with  standard light narrow-band imaging. It was considered normal.  The  stomach was remarkable for previous gastric bypass with Ishmael-en-Y  gastric jejunostomy. A small gastric pouch was present, estimated to be  not more than 3% of the size for normal stomach. The gastric  jejunostomy was unremarkable. The jejunal limb was examined as far as  the endoscope could be easily advanced. This was also normal.    ASSESSMENT:  1. No evidence of recurrent esophageal varices. 2.  Status post gastric bypass with Ishmael-en-Y gastric jejunostomy. 3.  Normal postoperative anatomy. COMMENTS AND RECOMMENDATION:  Findings on today's exam are normal.   Postoperative findings do not explain the patient's symptoms. I will  recommend that she have a CT of the abdomen and pelvis for completeness. Followup visit will be scheduled with Rosi He CNP in one month. Note is made that the gallbladder is surgically absent, so I am not  planning any gallbladder studies.         Sergio Bueno M.D.    D: 05/23/2019 10:33:20       T: 05/23/2019 11:35:05     [email or web address]  Job#: [id number]     Doc#: [id number]    CC:  Sharon Strickland MD, ANNA [email or web address]

## 2019-05-23 NOTE — H&P
[address] [place] [address]                              HISTORY AND PHYSICAL    PATIENT NAME: Vishnu Gonsales                       :        1980  MED REC NO:   [id number]                           ROOM:  ACCOUNT NO:   [de-identified]                           ADMIT DATE: 2019  PROVIDER:     Lauren Braun. Piotr Martínez M.D. HISTORY OF PRESENT ILLNESS:  A 71-year-old white female with three-week  history of nausea, vomiting, postprandial abdominal pain. She has a  history of cirrhosis. PHYSICAL EXAMINATION:  CHEST:  Clear. HEART:  Regular. ABDOMEN:  Soft, nontender. ASSESSMENT:  1.  Nausea, vomiting and abdominal pain. 2.  Alcoholic cirrhosis. PLAN:  Airway has been assessed for the purpose of this procedure and is  deemed adequate. ASA class is III.         Diedra Favre, M.D.    D: 2019 10:28:46       T: 2019 10:44:35     [id number]  Job#: [id number]     Doc#: [id number]    CC:

## 2019-05-24 ENCOUNTER — HOSPITAL ENCOUNTER (OUTPATIENT)
Age: 39
Discharge: HOME | End: 2019-05-24
Payer: MEDICARE

## 2019-05-24 LAB
ANION GAP SERPL CALCULATED.3IONS-SCNC: 11 MEQ/L (ref 8–16)
BASOPHILS # BLD: 0.4 %
BASOPHILS ABSOLUTE: 0 THOU/MM3 (ref 0–0.1)
BUN BLDV-MCNC: 7 MG/DL (ref 7–22)
CHLORIDE BLD-SCNC: 101 MEQ/L (ref 98–111)
CO2: 27 MEQ/L (ref 23–33)
CREAT SERPL-MCNC: 0.6 MG/DL (ref 0.4–1.2)
EOSINOPHIL # BLD: 5 %
EOSINOPHILS ABSOLUTE: 0.3 THOU/MM3 (ref 0–0.4)
ERYTHROCYTE [DISTWIDTH] IN BLOOD BY AUTOMATED COUNT: 17.4 % (ref 11.5–14.5)
ERYTHROCYTE [DISTWIDTH] IN BLOOD BY AUTOMATED COUNT: 48.8 FL (ref 35–45)
GFR SERPL CREATININE-BSD FRML MDRD: > 90 ML/MIN/1.73M2
HCT VFR BLD CALC: 28.6 % (ref 37–47)
HEMOGLOBIN: 8.6 GM/DL (ref 12–16)
IMMATURE GRANS (ABS): 0 THOU/MM3 (ref 0–0.07)
IMMATURE GRANULOCYTES: 0 %
LYMPHOCYTES # BLD: 53.8 %
LYMPHOCYTES ABSOLUTE: 2.7 THOU/MM3 (ref 1–4.8)
MCH RBC QN AUTO: 23.3 PG (ref 26–33)
MCHC RBC AUTO-ENTMCNC: 30.1 GM/DL (ref 32.2–35.5)
MCV RBC AUTO: 77.5 FL (ref 81–99)
MONOCYTES # BLD: 13.1 %
MONOCYTES ABSOLUTE: 0.7 THOU/MM3 (ref 0.4–1.3)
NUCLEATED RED BLOOD CELLS: 0 /100 WBC
PLATELET # BLD: 181 THOU/MM3 (ref 130–400)
PMV BLD AUTO: 10.6 FL (ref 9.4–12.4)
POTASSIUM SERPL-SCNC: 3.8 MEQ/L (ref 3.5–5.2)
RBC # BLD: 3.69 MILL/MM3 (ref 4.2–5.4)
SEG NEUTROPHILS: 27.7 %
SEGMENTED NEUTROPHILS ABSOLUTE COUNT: 1.4 THOU/MM3 (ref 1.8–7.7)
SODIUM BLD-SCNC: 139 MEQ/L (ref 135–145)
WBC # BLD: 5 THOU/MM3 (ref 4.8–10.8)

## 2019-05-24 PROCEDURE — 36415 COLL VENOUS BLD VENIPUNCTURE: CPT

## 2019-05-24 PROCEDURE — 84520 ASSAY OF UREA NITROGEN: CPT

## 2019-05-24 PROCEDURE — 80051 ELECTROLYTE PANEL: CPT

## 2019-05-24 PROCEDURE — 85025 COMPLETE CBC W/AUTO DIFF WBC: CPT

## 2019-05-24 PROCEDURE — 82565 ASSAY OF CREATININE: CPT

## 2019-05-28 ENCOUNTER — HOSPITAL ENCOUNTER (OUTPATIENT)
Dept: CT IMAGING | Age: 39
Discharge: HOME | End: 2019-05-28
Payer: MEDICARE

## 2019-05-28 DIAGNOSIS — K70.30 ALCOHOLIC CIRRHOSIS OF LIVER WITHOUT ASCITES (HCC): ICD-10-CM

## 2019-05-28 PROCEDURE — 74177 CT ABD & PELVIS W/CONTRAST: CPT

## 2019-05-28 PROCEDURE — 6360000004 HC RX CONTRAST MEDICATION: Performed by: INTERNAL MEDICINE

## 2019-05-28 RX ADMIN — IOPAMIDOL 100 ML: 755 INJECTION, SOLUTION INTRAVENOUS at 16:05

## 2019-05-28 RX ADMIN — IOHEXOL 50 ML: 240 INJECTION, SOLUTION INTRATHECAL; INTRAVASCULAR; INTRAVENOUS; ORAL at 16:05

## 2019-06-04 ENCOUNTER — HOSPITAL ENCOUNTER (OUTPATIENT)
Age: 39
Discharge: HOME | End: 2019-06-04
Payer: MEDICARE

## 2019-06-04 LAB
ANION GAP SERPL CALCULATED.3IONS-SCNC: 12 MEQ/L (ref 8–16)
BASOPHILS # BLD: 0.4 %
BASOPHILS ABSOLUTE: 0 THOU/MM3 (ref 0–0.1)
BUN BLDV-MCNC: 7 MG/DL (ref 7–22)
CHLORIDE BLD-SCNC: 101 MEQ/L (ref 98–111)
CO2: 27 MEQ/L (ref 23–33)
CREAT SERPL-MCNC: 0.9 MG/DL (ref 0.4–1.2)
EOSINOPHIL # BLD: 4.4 %
EOSINOPHILS ABSOLUTE: 0.2 THOU/MM3 (ref 0–0.4)
ERYTHROCYTE [DISTWIDTH] IN BLOOD BY AUTOMATED COUNT: 17.2 % (ref 11.5–14.5)
ERYTHROCYTE [DISTWIDTH] IN BLOOD BY AUTOMATED COUNT: 46 FL (ref 35–45)
GFR SERPL CREATININE-BSD FRML MDRD: 70 ML/MIN/1.73M2
HCT VFR BLD CALC: 29.7 % (ref 37–47)
HEMOGLOBIN: 9.2 GM/DL (ref 12–16)
IMMATURE GRANS (ABS): 0.01 THOU/MM3 (ref 0–0.07)
IMMATURE GRANULOCYTES: 0.2 %
LYMPHOCYTES # BLD: 47.2 %
LYMPHOCYTES ABSOLUTE: 2.1 THOU/MM3 (ref 1–4.8)
MCH RBC QN AUTO: 22.9 PG (ref 26–33)
MCHC RBC AUTO-ENTMCNC: 31 GM/DL (ref 32.2–35.5)
MCV RBC AUTO: 73.9 FL (ref 81–99)
MONOCYTES # BLD: 11.7 %
MONOCYTES ABSOLUTE: 0.5 THOU/MM3 (ref 0.4–1.3)
NUCLEATED RED BLOOD CELLS: 0 /100 WBC
PLATELET # BLD: 195 THOU/MM3 (ref 130–400)
PMV BLD AUTO: 10.5 FL (ref 9.4–12.4)
POTASSIUM SERPL-SCNC: 3.1 MEQ/L (ref 3.5–5.2)
RBC # BLD: 4.02 MILL/MM3 (ref 4.2–5.4)
SEG NEUTROPHILS: 36.1 %
SEGMENTED NEUTROPHILS ABSOLUTE COUNT: 1.6 THOU/MM3 (ref 1.8–7.7)
SODIUM BLD-SCNC: 140 MEQ/L (ref 135–145)
WBC # BLD: 4.5 THOU/MM3 (ref 4.8–10.8)

## 2019-06-04 PROCEDURE — 36415 COLL VENOUS BLD VENIPUNCTURE: CPT

## 2019-06-04 PROCEDURE — 85025 COMPLETE CBC W/AUTO DIFF WBC: CPT

## 2019-06-04 PROCEDURE — 80051 ELECTROLYTE PANEL: CPT

## 2019-06-04 PROCEDURE — 84520 ASSAY OF UREA NITROGEN: CPT

## 2019-06-04 PROCEDURE — 82565 ASSAY OF CREATININE: CPT

## 2019-06-12 ENCOUNTER — HOSPITAL ENCOUNTER (OUTPATIENT)
Age: 39
Discharge: HOME | End: 2019-06-12
Payer: MEDICARE

## 2019-06-12 LAB
ANION GAP SERPL CALCULATED.3IONS-SCNC: 18 MEQ/L (ref 8–16)
BASOPHILS # BLD: 0.6 %
BASOPHILS ABSOLUTE: 0 THOU/MM3 (ref 0–0.1)
BUN BLDV-MCNC: 6 MG/DL (ref 7–22)
CHLORIDE BLD-SCNC: 95 MEQ/L (ref 98–111)
CO2: 25 MEQ/L (ref 23–33)
CREAT SERPL-MCNC: 0.7 MG/DL (ref 0.4–1.2)
EOSINOPHIL # BLD: 4 %
EOSINOPHILS ABSOLUTE: 0.2 THOU/MM3 (ref 0–0.4)
ERYTHROCYTE [DISTWIDTH] IN BLOOD BY AUTOMATED COUNT: 20.5 % (ref 11.5–14.5)
ERYTHROCYTE [DISTWIDTH] IN BLOOD BY AUTOMATED COUNT: 51.3 FL (ref 35–45)
FOLATE: > 20 NG/ML (ref 4.8–24.2)
GFR SERPL CREATININE-BSD FRML MDRD: > 90 ML/MIN/1.73M2
HCT VFR BLD CALC: 33.7 % (ref 37–47)
HEMOGLOBIN: 10.3 GM/DL (ref 12–16)
IMMATURE GRANS (ABS): 0.01 THOU/MM3 (ref 0–0.07)
IMMATURE GRANULOCYTES: 0.2 %
LYMPHOCYTES # BLD: 50.1 %
LYMPHOCYTES ABSOLUTE: 2.4 THOU/MM3 (ref 1–4.8)
MCH RBC QN AUTO: 23.3 PG (ref 26–33)
MCHC RBC AUTO-ENTMCNC: 30.6 GM/DL (ref 32.2–35.5)
MCV RBC AUTO: 76.2 FL (ref 81–99)
MONOCYTES # BLD: 8.6 %
MONOCYTES ABSOLUTE: 0.4 THOU/MM3 (ref 0.4–1.3)
NUCLEATED RED BLOOD CELLS: 0 /100 WBC
PLATELET # BLD: 210 THOU/MM3 (ref 130–400)
PMV BLD AUTO: 11.1 FL (ref 9.4–12.4)
POTASSIUM SERPL-SCNC: 3.6 MEQ/L (ref 3.5–5.2)
RBC # BLD: 4.42 MILL/MM3 (ref 4.2–5.4)
SEG NEUTROPHILS: 36.5 %
SEGMENTED NEUTROPHILS ABSOLUTE COUNT: 1.8 THOU/MM3 (ref 1.8–7.7)
SODIUM BLD-SCNC: 138 MEQ/L (ref 135–145)
VITAMIN B-12: 1012 PG/ML (ref 211–911)
WBC # BLD: 4.8 THOU/MM3 (ref 4.8–10.8)

## 2019-06-12 PROCEDURE — 82746 ASSAY OF FOLIC ACID SERUM: CPT

## 2019-06-12 PROCEDURE — 85025 COMPLETE CBC W/AUTO DIFF WBC: CPT

## 2019-06-12 PROCEDURE — 84520 ASSAY OF UREA NITROGEN: CPT

## 2019-06-12 PROCEDURE — 36415 COLL VENOUS BLD VENIPUNCTURE: CPT

## 2019-06-12 PROCEDURE — 82565 ASSAY OF CREATININE: CPT

## 2019-06-12 PROCEDURE — 84238 ASSAY NONENDOCRINE RECEPTOR: CPT

## 2019-06-12 PROCEDURE — 80051 ELECTROLYTE PANEL: CPT

## 2019-06-12 PROCEDURE — 82607 VITAMIN B-12: CPT

## 2019-06-15 LAB — SOLUBLE TRANSFERRIN RECEPT: 11.5 MG/L (ref 1.9–4.4)

## 2019-06-18 ENCOUNTER — HOSPITAL ENCOUNTER (OUTPATIENT)
Age: 39
Discharge: HOME | End: 2019-06-18
Payer: MEDICARE

## 2019-06-18 LAB
ANION GAP SERPL CALCULATED.3IONS-SCNC: 14 MEQ/L (ref 8–16)
ANISOCYTOSIS: PRESENT
BASOPHILS # BLD: 0.5 %
BASOPHILS ABSOLUTE: 0 THOU/MM3 (ref 0–0.1)
BUN BLDV-MCNC: 5 MG/DL (ref 7–22)
CHLORIDE BLD-SCNC: 101 MEQ/L (ref 98–111)
CO2: 22 MEQ/L (ref 23–33)
CREAT SERPL-MCNC: 0.7 MG/DL (ref 0.4–1.2)
ELLIPTOCYTES: ABNORMAL
EOSINOPHIL # BLD: 5.1 %
EOSINOPHILS ABSOLUTE: 0.2 THOU/MM3 (ref 0–0.4)
ERYTHROCYTE [DISTWIDTH] IN BLOOD BY AUTOMATED COUNT: 23.7 % (ref 11.5–14.5)
ERYTHROCYTE [DISTWIDTH] IN BLOOD BY AUTOMATED COUNT: 67.8 FL (ref 35–45)
GFR SERPL CREATININE-BSD FRML MDRD: > 90 ML/MIN/1.73M2
HCT VFR BLD CALC: 32 % (ref 37–47)
HEMOGLOBIN: 9.5 GM/DL (ref 12–16)
HYPOCHROMIA: PRESENT
IMMATURE GRANS (ABS): 0.01 THOU/MM3 (ref 0–0.07)
IMMATURE GRANULOCYTES: 0.2 %
LYMPHOCYTES # BLD: 50.4 %
LYMPHOCYTES ABSOLUTE: 2.1 THOU/MM3 (ref 1–4.8)
MCH RBC QN AUTO: 23.6 PG (ref 26–33)
MCHC RBC AUTO-ENTMCNC: 29.7 GM/DL (ref 32.2–35.5)
MCV RBC AUTO: 79.6 FL (ref 81–99)
MONOCYTES # BLD: 9.4 %
MONOCYTES ABSOLUTE: 0.4 THOU/MM3 (ref 0.4–1.3)
NUCLEATED RED BLOOD CELLS: 0 /100 WBC
PLATELET # BLD: 140 THOU/MM3 (ref 130–400)
PLATELET ESTIMATE: ADEQUATE
PMV BLD AUTO: 10.7 FL (ref 9.4–12.4)
POTASSIUM SERPL-SCNC: 3.9 MEQ/L (ref 3.5–5.2)
RBC # BLD: 4.02 MILL/MM3 (ref 4.2–5.4)
SCAN OF BLOOD SMEAR: NORMAL
SEG NEUTROPHILS: 34.4 %
SEGMENTED NEUTROPHILS ABSOLUTE COUNT: 1.4 THOU/MM3 (ref 1.8–7.7)
SODIUM BLD-SCNC: 137 MEQ/L (ref 135–145)
WBC # BLD: 4.2 THOU/MM3 (ref 4.8–10.8)

## 2019-06-18 PROCEDURE — 82565 ASSAY OF CREATININE: CPT

## 2019-06-18 PROCEDURE — 36415 COLL VENOUS BLD VENIPUNCTURE: CPT

## 2019-06-18 PROCEDURE — 80051 ELECTROLYTE PANEL: CPT

## 2019-06-18 PROCEDURE — 84520 ASSAY OF UREA NITROGEN: CPT

## 2019-06-18 PROCEDURE — 85025 COMPLETE CBC W/AUTO DIFF WBC: CPT

## 2019-06-28 ENCOUNTER — HOSPITAL ENCOUNTER (OUTPATIENT)
Age: 39
Discharge: HOME | End: 2019-06-28
Payer: MEDICARE

## 2019-06-28 LAB
ANION GAP SERPL CALCULATED.3IONS-SCNC: 12 MEQ/L (ref 8–16)
BUN BLDV-MCNC: 7 MG/DL (ref 7–22)
CHLORIDE BLD-SCNC: 105 MEQ/L (ref 98–111)
CO2: 26 MEQ/L (ref 23–33)
CREAT SERPL-MCNC: 0.6 MG/DL (ref 0.4–1.2)
GFR SERPL CREATININE-BSD FRML MDRD: > 90 ML/MIN/1.73M2
POTASSIUM SERPL-SCNC: 4.5 MEQ/L (ref 3.5–5.2)
SODIUM BLD-SCNC: 143 MEQ/L (ref 135–145)

## 2019-06-28 PROCEDURE — 82565 ASSAY OF CREATININE: CPT

## 2019-06-28 PROCEDURE — 36415 COLL VENOUS BLD VENIPUNCTURE: CPT

## 2019-06-28 PROCEDURE — 80051 ELECTROLYTE PANEL: CPT

## 2019-06-28 PROCEDURE — 84520 ASSAY OF UREA NITROGEN: CPT

## 2019-07-06 ENCOUNTER — HOSPITAL ENCOUNTER (OUTPATIENT)
Age: 39
Discharge: HOME | End: 2019-07-06
Payer: MEDICARE

## 2019-07-06 LAB
ANION GAP SERPL CALCULATED.3IONS-SCNC: 9 MEQ/L (ref 8–16)
BASOPHILS # BLD: 1 %
BASOPHILS ABSOLUTE: 0.1 THOU/MM3 (ref 0–0.1)
BUN BLDV-MCNC: 4 MG/DL (ref 7–22)
CHLORIDE BLD-SCNC: 100 MEQ/L (ref 98–111)
CO2: 29 MEQ/L (ref 23–33)
CREAT SERPL-MCNC: 0.6 MG/DL (ref 0.4–1.2)
EOSINOPHIL # BLD: 5.8 %
EOSINOPHILS ABSOLUTE: 0.3 THOU/MM3 (ref 0–0.4)
GFR SERPL CREATININE-BSD FRML MDRD: > 90 ML/MIN/1.73M2
HCT VFR BLD CALC: 36.5 % (ref 37–47)
HEMOGLOBIN: 11.5 GM/DL (ref 12–16)
IMMATURE GRANS (ABS): 0.01 THOU/MM3 (ref 0–0.07)
IMMATURE GRANULOCYTES: 0.2 %
LYMPHOCYTES # BLD: 51.4 %
LYMPHOCYTES ABSOLUTE: 2.7 THOU/MM3 (ref 1–4.8)
MCH RBC QN AUTO: 26.3 PG (ref 26–33)
MCHC RBC AUTO-ENTMCNC: 31.5 GM/DL (ref 32.2–35.5)
MCV RBC AUTO: 83.5 FL (ref 81–99)
MONOCYTES # BLD: 15 %
MONOCYTES ABSOLUTE: 0.8 THOU/MM3 (ref 0.4–1.3)
NUCLEATED RED BLOOD CELLS: 0 /100 WBC
PLATELET # BLD: 131 THOU/MM3 (ref 130–400)
PLATELET ESTIMATE: ADEQUATE
PMV BLD AUTO: 11 FL (ref 9.4–12.4)
POIKILOCYTES: ABNORMAL
POTASSIUM SERPL-SCNC: 3.9 MEQ/L (ref 3.5–5.2)
RBC # BLD: 4.37 MILL/MM3 (ref 4.2–5.4)
SCAN OF BLOOD SMEAR: NORMAL
SEG NEUTROPHILS: 26.6 %
SEGMENTED NEUTROPHILS ABSOLUTE COUNT: 1.4 THOU/MM3 (ref 1.8–7.7)
SODIUM BLD-SCNC: 138 MEQ/L (ref 135–145)
WBC # BLD: 5.2 THOU/MM3 (ref 4.8–10.8)

## 2019-07-06 PROCEDURE — 84520 ASSAY OF UREA NITROGEN: CPT

## 2019-07-06 PROCEDURE — 82565 ASSAY OF CREATININE: CPT

## 2019-07-06 PROCEDURE — 80051 ELECTROLYTE PANEL: CPT

## 2019-07-06 PROCEDURE — 85025 COMPLETE CBC W/AUTO DIFF WBC: CPT

## 2019-07-06 PROCEDURE — 36415 COLL VENOUS BLD VENIPUNCTURE: CPT

## 2019-07-15 ENCOUNTER — HOSPITAL ENCOUNTER (OUTPATIENT)
Age: 39
Discharge: HOME | End: 2019-07-15
Payer: MEDICARE

## 2019-07-15 LAB
ANION GAP SERPL CALCULATED.3IONS-SCNC: 13 MEQ/L (ref 8–16)
ANISOCYTOSIS: PRESENT
BASOPHILS # BLD: 0.7 %
BASOPHILS ABSOLUTE: 0 THOU/MM3 (ref 0–0.1)
BUN BLDV-MCNC: 6 MG/DL (ref 7–22)
CHLORIDE BLD-SCNC: 105 MEQ/L (ref 98–111)
CO2: 26 MEQ/L (ref 23–33)
CREAT SERPL-MCNC: 0.7 MG/DL (ref 0.4–1.2)
EOSINOPHIL # BLD: 10.5 %
EOSINOPHILS ABSOLUTE: 0.5 THOU/MM3 (ref 0–0.4)
GFR SERPL CREATININE-BSD FRML MDRD: > 90 ML/MIN/1.73M2
HCT VFR BLD CALC: 38.4 % (ref 37–47)
HEMOGLOBIN: 12 GM/DL (ref 12–16)
HYPOCHROMIA: PRESENT
IMMATURE GRANS (ABS): 0.01 THOU/MM3 (ref 0–0.07)
IMMATURE GRANULOCYTES: 0.2 %
LYMPHOCYTES # BLD: 42.8 %
LYMPHOCYTES ABSOLUTE: 1.8 THOU/MM3 (ref 1–4.8)
MCH RBC QN AUTO: 27.3 PG (ref 26–33)
MCHC RBC AUTO-ENTMCNC: 31.3 GM/DL (ref 32.2–35.5)
MCV RBC AUTO: 87.3 FL (ref 81–99)
MONOCYTES # BLD: 12.6 %
MONOCYTES ABSOLUTE: 0.5 THOU/MM3 (ref 0.4–1.3)
NUCLEATED RED BLOOD CELLS: 0 /100 WBC
PLATELET # BLD: 123 THOU/MM3 (ref 130–400)
PLATELET ESTIMATE: ADEQUATE
PMV BLD AUTO: 10.9 FL (ref 9.4–12.4)
POIKILOCYTES: ABNORMAL
POTASSIUM SERPL-SCNC: 4 MEQ/L (ref 3.5–5.2)
RBC # BLD: 4.4 MILL/MM3 (ref 4.2–5.4)
SCAN OF BLOOD SMEAR: NORMAL
SEG NEUTROPHILS: 33.2 %
SEGMENTED NEUTROPHILS ABSOLUTE COUNT: 1.4 THOU/MM3 (ref 1.8–7.7)
SODIUM BLD-SCNC: 144 MEQ/L (ref 135–145)
WBC # BLD: 4.3 THOU/MM3 (ref 4.8–10.8)

## 2019-07-15 PROCEDURE — 36415 COLL VENOUS BLD VENIPUNCTURE: CPT

## 2019-07-15 PROCEDURE — 82565 ASSAY OF CREATININE: CPT

## 2019-07-15 PROCEDURE — 80051 ELECTROLYTE PANEL: CPT

## 2019-07-15 PROCEDURE — 84520 ASSAY OF UREA NITROGEN: CPT

## 2019-07-15 PROCEDURE — 85025 COMPLETE CBC W/AUTO DIFF WBC: CPT

## 2019-07-17 ENCOUNTER — HOSPITAL ENCOUNTER (OUTPATIENT)
Age: 39
Discharge: HOME | End: 2019-07-17
Payer: MEDICARE

## 2019-07-17 LAB — PREGNANCY, URINE: NEGATIVE

## 2019-07-17 PROCEDURE — 81001 URINALYSIS AUTO W/SCOPE: CPT

## 2019-07-17 PROCEDURE — 84703 CHORIONIC GONADOTROPIN ASSAY: CPT

## 2019-07-18 LAB
BILIRUBIN URINE: NEGATIVE
BLOOD, URINE: NEGATIVE
CHARACTER, URINE: CLEAR
COLOR: YELLOW
GLUCOSE, URINE: NEGATIVE MG/DL
KETONES, URINE: NEGATIVE
LEUKOCYTE ESTERASE, URINE: NEGATIVE
NITRITE, URINE: NEGATIVE
PH UA: 7 (ref 5–9)
PROTEIN UA: NEGATIVE MG/DL
REFLEX TO URINE C & S: NORMAL
SPECIFIC GRAVITY UA: 1.01 (ref 1–1.03)
UROBILINOGEN, URINE: 0.2 EU/DL (ref 0–1)

## 2019-07-23 ENCOUNTER — HOSPITAL ENCOUNTER (OUTPATIENT)
Age: 39
Discharge: HOME | End: 2019-07-23
Payer: MEDICARE

## 2019-07-23 LAB
ANION GAP SERPL CALCULATED.3IONS-SCNC: 16 MEQ/L (ref 8–16)
ANISOCYTOSIS: PRESENT
BASOPHILS # BLD: 0.5 %
BASOPHILS ABSOLUTE: 0 THOU/MM3 (ref 0–0.1)
BUN BLDV-MCNC: 8 MG/DL (ref 7–22)
CHLORIDE BLD-SCNC: 102 MEQ/L (ref 98–111)
CO2: 25 MEQ/L (ref 23–33)
CREAT SERPL-MCNC: 0.8 MG/DL (ref 0.4–1.2)
ELLIPTOCYTES: ABNORMAL
EOSINOPHIL # BLD: 9.3 %
EOSINOPHILS ABSOLUTE: 0.4 THOU/MM3 (ref 0–0.4)
ERYTHROCYTE [DISTWIDTH] IN BLOOD BY AUTOMATED COUNT: 25.3 % (ref 11.5–14.5)
ERYTHROCYTE [DISTWIDTH] IN BLOOD BY AUTOMATED COUNT: 73.2 FL (ref 35–45)
GFR SERPL CREATININE-BSD FRML MDRD: 80 ML/MIN/1.73M2
HCT VFR BLD CALC: 39.3 % (ref 37–47)
HEMOGLOBIN: 12.7 GM/DL (ref 12–16)
IMMATURE GRANS (ABS): 0 THOU/MM3 (ref 0–0.07)
IMMATURE GRANULOCYTES: 0 %
LYMPHOCYTES # BLD: 44.3 %
LYMPHOCYTES ABSOLUTE: 1.9 THOU/MM3 (ref 1–4.8)
MCH RBC QN AUTO: 27.6 PG (ref 26–33)
MCHC RBC AUTO-ENTMCNC: 32.3 GM/DL (ref 32.2–35.5)
MCV RBC AUTO: 85.4 FL (ref 81–99)
MONOCYTES # BLD: 17.2 %
MONOCYTES ABSOLUTE: 0.7 THOU/MM3 (ref 0.4–1.3)
NUCLEATED RED BLOOD CELLS: 0 /100 WBC
PLATELET # BLD: 117 THOU/MM3 (ref 130–400)
PMV BLD AUTO: 10.4 FL (ref 9.4–12.4)
POIKILOCYTES: ABNORMAL
POTASSIUM SERPL-SCNC: 3.8 MEQ/L (ref 3.5–5.2)
RBC # BLD: 4.6 MILL/MM3 (ref 4.2–5.4)
SCAN OF BLOOD SMEAR: NORMAL
SEG NEUTROPHILS: 28.7 %
SEGMENTED NEUTROPHILS ABSOLUTE COUNT: 1.2 THOU/MM3 (ref 1.8–7.7)
SODIUM BLD-SCNC: 143 MEQ/L (ref 135–145)
TARGET CELLS: ABNORMAL
WBC # BLD: 4.3 THOU/MM3 (ref 4.8–10.8)

## 2019-07-23 PROCEDURE — 82565 ASSAY OF CREATININE: CPT

## 2019-07-23 PROCEDURE — 85025 COMPLETE CBC W/AUTO DIFF WBC: CPT

## 2019-07-23 PROCEDURE — 84520 ASSAY OF UREA NITROGEN: CPT

## 2019-07-23 PROCEDURE — 36415 COLL VENOUS BLD VENIPUNCTURE: CPT

## 2019-07-23 PROCEDURE — 80051 ELECTROLYTE PANEL: CPT

## 2019-08-02 ENCOUNTER — HOSPITAL ENCOUNTER (OUTPATIENT)
Age: 39
Discharge: HOME | End: 2019-08-02
Payer: MEDICARE

## 2019-08-02 LAB
ANION GAP SERPL CALCULATED.3IONS-SCNC: 13 MEQ/L (ref 8–16)
BUN BLDV-MCNC: 6 MG/DL (ref 7–22)
CHLORIDE BLD-SCNC: 101 MEQ/L (ref 98–111)
CO2: 29 MEQ/L (ref 23–33)
CREAT SERPL-MCNC: 0.7 MG/DL (ref 0.4–1.2)
GFR SERPL CREATININE-BSD FRML MDRD: > 90 ML/MIN/1.73M2
POTASSIUM SERPL-SCNC: 3.6 MEQ/L (ref 3.5–5.2)
SODIUM BLD-SCNC: 143 MEQ/L (ref 135–145)

## 2019-08-02 PROCEDURE — 82565 ASSAY OF CREATININE: CPT

## 2019-08-02 PROCEDURE — 36415 COLL VENOUS BLD VENIPUNCTURE: CPT

## 2019-08-02 PROCEDURE — 80051 ELECTROLYTE PANEL: CPT

## 2019-08-02 PROCEDURE — 84520 ASSAY OF UREA NITROGEN: CPT

## 2019-08-17 ENCOUNTER — HOSPITAL ENCOUNTER (OUTPATIENT)
Age: 39
Discharge: HOME | End: 2019-08-17
Payer: MEDICARE

## 2019-08-17 LAB
ANION GAP SERPL CALCULATED.3IONS-SCNC: 11 MEQ/L (ref 8–16)
BUN BLDV-MCNC: 8 MG/DL (ref 7–22)
CHLORIDE BLD-SCNC: 103 MEQ/L (ref 98–111)
CO2: 30 MEQ/L (ref 23–33)
CREAT SERPL-MCNC: 0.6 MG/DL (ref 0.4–1.2)
GFR SERPL CREATININE-BSD FRML MDRD: > 90 ML/MIN/1.73M2
POTASSIUM SERPL-SCNC: 3.9 MEQ/L (ref 3.5–5.2)
SODIUM BLD-SCNC: 144 MEQ/L (ref 135–145)

## 2019-08-17 PROCEDURE — 80051 ELECTROLYTE PANEL: CPT

## 2019-08-17 PROCEDURE — 36415 COLL VENOUS BLD VENIPUNCTURE: CPT

## 2019-08-17 PROCEDURE — 84520 ASSAY OF UREA NITROGEN: CPT

## 2019-08-17 PROCEDURE — 82565 ASSAY OF CREATININE: CPT

## 2019-08-27 ENCOUNTER — HOSPITAL ENCOUNTER (OUTPATIENT)
Age: 39
Discharge: HOME | End: 2019-08-27
Payer: MEDICARE

## 2019-08-27 LAB
ANION GAP SERPL CALCULATED.3IONS-SCNC: 13 MEQ/L (ref 8–16)
BUN BLDV-MCNC: 5 MG/DL (ref 7–22)
CHLORIDE BLD-SCNC: 104 MEQ/L (ref 98–111)
CO2: 25 MEQ/L (ref 23–33)
CREAT SERPL-MCNC: 0.7 MG/DL (ref 0.4–1.2)
GFR SERPL CREATININE-BSD FRML MDRD: > 90 ML/MIN/1.73M2
POTASSIUM SERPL-SCNC: 4.1 MEQ/L (ref 3.5–5.2)
SODIUM BLD-SCNC: 142 MEQ/L (ref 135–145)

## 2019-08-27 PROCEDURE — 82565 ASSAY OF CREATININE: CPT

## 2019-08-27 PROCEDURE — 80051 ELECTROLYTE PANEL: CPT

## 2019-08-27 PROCEDURE — 84520 ASSAY OF UREA NITROGEN: CPT

## 2019-08-27 PROCEDURE — 36415 COLL VENOUS BLD VENIPUNCTURE: CPT

## 2019-09-05 ENCOUNTER — HOSPITAL ENCOUNTER (OUTPATIENT)
Age: 39
Discharge: HOME | End: 2019-09-05
Payer: MEDICARE

## 2019-09-05 LAB
ANION GAP SERPL CALCULATED.3IONS-SCNC: 11 MEQ/L (ref 8–16)
BUN BLDV-MCNC: 9 MG/DL (ref 7–22)
CHLORIDE BLD-SCNC: 104 MEQ/L (ref 98–111)
CO2: 25 MEQ/L (ref 23–33)
CREAT SERPL-MCNC: 0.6 MG/DL (ref 0.4–1.2)
GFR SERPL CREATININE-BSD FRML MDRD: > 90 ML/MIN/1.73M2
POTASSIUM SERPL-SCNC: 4.2 MEQ/L (ref 3.5–5.2)
SODIUM BLD-SCNC: 140 MEQ/L (ref 135–145)

## 2019-09-05 PROCEDURE — 84520 ASSAY OF UREA NITROGEN: CPT

## 2019-09-05 PROCEDURE — 36415 COLL VENOUS BLD VENIPUNCTURE: CPT

## 2019-09-05 PROCEDURE — 80051 ELECTROLYTE PANEL: CPT

## 2019-09-05 PROCEDURE — 82565 ASSAY OF CREATININE: CPT

## 2019-09-16 ENCOUNTER — HOSPITAL ENCOUNTER (OUTPATIENT)
Age: 39
Discharge: HOME | End: 2019-09-16
Payer: MEDICARE

## 2019-09-16 LAB
ANION GAP SERPL CALCULATED.3IONS-SCNC: 12 MEQ/L (ref 8–16)
BUN BLDV-MCNC: 11 MG/DL (ref 7–22)
CHLORIDE BLD-SCNC: 100 MEQ/L (ref 98–111)
CO2: 26 MEQ/L (ref 23–33)
CREAT SERPL-MCNC: 0.7 MG/DL (ref 0.4–1.2)
GFR SERPL CREATININE-BSD FRML MDRD: > 90 ML/MIN/1.73M2
POTASSIUM SERPL-SCNC: 3.8 MEQ/L (ref 3.5–5.2)
SODIUM BLD-SCNC: 138 MEQ/L (ref 135–145)

## 2019-09-16 PROCEDURE — 82565 ASSAY OF CREATININE: CPT

## 2019-09-16 PROCEDURE — 36415 COLL VENOUS BLD VENIPUNCTURE: CPT

## 2019-09-16 PROCEDURE — 80051 ELECTROLYTE PANEL: CPT

## 2019-09-16 PROCEDURE — 84520 ASSAY OF UREA NITROGEN: CPT

## 2019-09-27 ENCOUNTER — HOSPITAL ENCOUNTER (OUTPATIENT)
Age: 39
Discharge: HOME | End: 2019-09-27
Payer: MEDICARE

## 2019-09-27 LAB
AMMONIA: 45 UMOL/L (ref 11–60)
ANION GAP SERPL CALCULATED.3IONS-SCNC: 14 MEQ/L (ref 8–16)
BASOPHILS # BLD: 0.9 %
BASOPHILS ABSOLUTE: 0 THOU/MM3 (ref 0–0.1)
BUN BLDV-MCNC: 7 MG/DL (ref 7–22)
CHLORIDE BLD-SCNC: 103 MEQ/L (ref 98–111)
CO2: 25 MEQ/L (ref 23–33)
CREAT SERPL-MCNC: 0.6 MG/DL (ref 0.4–1.2)
EOSINOPHIL # BLD: 8.8 %
EOSINOPHILS ABSOLUTE: 0.4 THOU/MM3 (ref 0–0.4)
ERYTHROCYTE [DISTWIDTH] IN BLOOD BY AUTOMATED COUNT: 13.2 % (ref 11.5–14.5)
ERYTHROCYTE [DISTWIDTH] IN BLOOD BY AUTOMATED COUNT: 45.6 FL (ref 35–45)
GFR SERPL CREATININE-BSD FRML MDRD: > 90 ML/MIN/1.73M2
HCT VFR BLD CALC: 41.3 % (ref 37–47)
HEMOGLOBIN: 13.2 GM/DL (ref 12–16)
IMMATURE GRANS (ABS): 0 THOU/MM3 (ref 0–0.07)
IMMATURE GRANULOCYTES: 0 %
LYMPHOCYTES # BLD: 51 %
LYMPHOCYTES ABSOLUTE: 2.2 THOU/MM3 (ref 1–4.8)
MCH RBC QN AUTO: 30.1 PG (ref 26–33)
MCHC RBC AUTO-ENTMCNC: 32 GM/DL (ref 32.2–35.5)
MCV RBC AUTO: 94.3 FL (ref 81–99)
MONOCYTES # BLD: 10.2 %
MONOCYTES ABSOLUTE: 0.4 THOU/MM3 (ref 0.4–1.3)
NUCLEATED RED BLOOD CELLS: 0 /100 WBC
PLATELET # BLD: 122 THOU/MM3 (ref 130–400)
PMV BLD AUTO: 10.9 FL (ref 9.4–12.4)
POTASSIUM SERPL-SCNC: 4.1 MEQ/L (ref 3.5–5.2)
RBC # BLD: 4.38 MILL/MM3 (ref 4.2–5.4)
SEG NEUTROPHILS: 29.1 %
SEGMENTED NEUTROPHILS ABSOLUTE COUNT: 1.3 THOU/MM3 (ref 1.8–7.7)
SODIUM BLD-SCNC: 142 MEQ/L (ref 135–145)
TSH SERPL DL<=0.05 MIU/L-ACNC: 3.61 UIU/ML (ref 0.4–4.2)
WBC # BLD: 4.3 THOU/MM3 (ref 4.8–10.8)

## 2019-09-27 PROCEDURE — 84443 ASSAY THYROID STIM HORMONE: CPT

## 2019-09-27 PROCEDURE — 82140 ASSAY OF AMMONIA: CPT

## 2019-09-27 PROCEDURE — 84520 ASSAY OF UREA NITROGEN: CPT

## 2019-09-27 PROCEDURE — 82565 ASSAY OF CREATININE: CPT

## 2019-09-27 PROCEDURE — 85025 COMPLETE CBC W/AUTO DIFF WBC: CPT

## 2019-09-27 PROCEDURE — 80051 ELECTROLYTE PANEL: CPT

## 2019-09-27 PROCEDURE — 36415 COLL VENOUS BLD VENIPUNCTURE: CPT

## 2019-10-12 ENCOUNTER — HOSPITAL ENCOUNTER (OUTPATIENT)
Age: 39
Discharge: HOME | End: 2019-10-12
Payer: MEDICARE

## 2019-10-12 LAB
ANION GAP SERPL CALCULATED.3IONS-SCNC: 13 MEQ/L (ref 8–16)
BUN BLDV-MCNC: 8 MG/DL (ref 7–22)
CHLORIDE BLD-SCNC: 100 MEQ/L (ref 98–111)
CO2: 28 MEQ/L (ref 23–33)
CREAT SERPL-MCNC: 0.8 MG/DL (ref 0.4–1.2)
GFR SERPL CREATININE-BSD FRML MDRD: 80 ML/MIN/1.73M2
POTASSIUM SERPL-SCNC: 4 MEQ/L (ref 3.5–5.2)
SODIUM BLD-SCNC: 141 MEQ/L (ref 135–145)
TSH SERPL DL<=0.05 MIU/L-ACNC: 3.38 UIU/ML (ref 0.4–4.2)

## 2019-10-12 PROCEDURE — 84520 ASSAY OF UREA NITROGEN: CPT

## 2019-10-12 PROCEDURE — 84443 ASSAY THYROID STIM HORMONE: CPT

## 2019-10-12 PROCEDURE — 36415 COLL VENOUS BLD VENIPUNCTURE: CPT

## 2019-10-12 PROCEDURE — 82565 ASSAY OF CREATININE: CPT

## 2019-10-12 PROCEDURE — 80051 ELECTROLYTE PANEL: CPT

## 2019-10-22 ENCOUNTER — HOSPITAL ENCOUNTER (OUTPATIENT)
Age: 39
Discharge: HOME | End: 2019-10-22
Payer: MEDICARE

## 2019-10-22 LAB
ANION GAP SERPL CALCULATED.3IONS-SCNC: 11 MEQ/L (ref 8–16)
BUN BLDV-MCNC: 6 MG/DL (ref 7–22)
CHLORIDE BLD-SCNC: 103 MEQ/L (ref 98–111)
CO2: 25 MEQ/L (ref 23–33)
CREAT SERPL-MCNC: 0.8 MG/DL (ref 0.4–1.2)
GFR SERPL CREATININE-BSD FRML MDRD: 80 ML/MIN/1.73M2
POTASSIUM SERPL-SCNC: 4.1 MEQ/L (ref 3.5–5.2)
SODIUM BLD-SCNC: 139 MEQ/L (ref 135–145)

## 2019-10-22 PROCEDURE — 80051 ELECTROLYTE PANEL: CPT

## 2019-10-22 PROCEDURE — 36415 COLL VENOUS BLD VENIPUNCTURE: CPT

## 2019-10-22 PROCEDURE — 84520 ASSAY OF UREA NITROGEN: CPT

## 2019-10-22 PROCEDURE — 82565 ASSAY OF CREATININE: CPT

## 2019-11-05 ENCOUNTER — HOSPITAL ENCOUNTER (OUTPATIENT)
Age: 39
Discharge: HOME | End: 2019-11-05
Payer: MEDICARE

## 2019-11-05 LAB
ANION GAP SERPL CALCULATED.3IONS-SCNC: 14 MEQ/L (ref 8–16)
BASOPHILS # BLD: 0.8 %
BASOPHILS ABSOLUTE: 0 THOU/MM3 (ref 0–0.1)
BUN BLDV-MCNC: 7 MG/DL (ref 7–22)
CHLORIDE BLD-SCNC: 103 MEQ/L (ref 98–111)
CO2: 24 MEQ/L (ref 23–33)
CREAT SERPL-MCNC: 0.8 MG/DL (ref 0.4–1.2)
EOSINOPHIL # BLD: 9 %
EOSINOPHILS ABSOLUTE: 0.3 THOU/MM3 (ref 0–0.4)
ERYTHROCYTE [DISTWIDTH] IN BLOOD BY AUTOMATED COUNT: 13.4 % (ref 11.5–14.5)
ERYTHROCYTE [DISTWIDTH] IN BLOOD BY AUTOMATED COUNT: 47.8 FL (ref 35–45)
GFR SERPL CREATININE-BSD FRML MDRD: 80 ML/MIN/1.73M2
HCT VFR BLD CALC: 37.8 % (ref 37–47)
HEMOGLOBIN: 12.3 GM/DL (ref 12–16)
IMMATURE GRANS (ABS): 0.01 THOU/MM3 (ref 0–0.07)
IMMATURE GRANULOCYTES: 0.3 %
LYMPHOCYTES # BLD: 48.1 %
LYMPHOCYTES ABSOLUTE: 1.8 THOU/MM3 (ref 1–4.8)
MCH RBC QN AUTO: 31.3 PG (ref 26–33)
MCHC RBC AUTO-ENTMCNC: 32.5 GM/DL (ref 32.2–35.5)
MCV RBC AUTO: 96.2 FL (ref 81–99)
MONOCYTES # BLD: 7.2 %
MONOCYTES ABSOLUTE: 0.3 THOU/MM3 (ref 0.4–1.3)
NUCLEATED RED BLOOD CELLS: 0 /100 WBC
PLATELET # BLD: 131 THOU/MM3 (ref 130–400)
PMV BLD AUTO: 11.1 FL (ref 9.4–12.4)
POTASSIUM SERPL-SCNC: 4.5 MEQ/L (ref 3.5–5.2)
RBC # BLD: 3.93 MILL/MM3 (ref 4.2–5.4)
SEG NEUTROPHILS: 34.6 %
SEGMENTED NEUTROPHILS ABSOLUTE COUNT: 1.3 THOU/MM3 (ref 1.8–7.7)
SODIUM BLD-SCNC: 141 MEQ/L (ref 135–145)
WBC # BLD: 3.8 THOU/MM3 (ref 4.8–10.8)

## 2019-11-05 PROCEDURE — 82565 ASSAY OF CREATININE: CPT

## 2019-11-05 PROCEDURE — 80051 ELECTROLYTE PANEL: CPT

## 2019-11-05 PROCEDURE — 85025 COMPLETE CBC W/AUTO DIFF WBC: CPT

## 2019-11-05 PROCEDURE — 84520 ASSAY OF UREA NITROGEN: CPT

## 2019-11-05 PROCEDURE — 36415 COLL VENOUS BLD VENIPUNCTURE: CPT

## 2019-11-15 ENCOUNTER — HOSPITAL ENCOUNTER (OUTPATIENT)
Age: 39
Discharge: HOME | End: 2019-11-15
Payer: MEDICARE

## 2019-11-15 LAB
ANION GAP SERPL CALCULATED.3IONS-SCNC: 13 MEQ/L (ref 8–16)
BUN BLDV-MCNC: 7 MG/DL (ref 7–22)
CHLORIDE BLD-SCNC: 103 MEQ/L (ref 98–111)
CO2: 26 MEQ/L (ref 23–33)
CREAT SERPL-MCNC: 0.7 MG/DL (ref 0.4–1.2)
GFR SERPL CREATININE-BSD FRML MDRD: > 90 ML/MIN/1.73M2
POTASSIUM SERPL-SCNC: 4.4 MEQ/L (ref 3.5–5.2)
SODIUM BLD-SCNC: 142 MEQ/L (ref 135–145)

## 2019-11-15 PROCEDURE — 84520 ASSAY OF UREA NITROGEN: CPT

## 2019-11-15 PROCEDURE — 36415 COLL VENOUS BLD VENIPUNCTURE: CPT

## 2019-11-15 PROCEDURE — 80051 ELECTROLYTE PANEL: CPT

## 2019-11-15 PROCEDURE — 82565 ASSAY OF CREATININE: CPT

## 2019-12-01 ENCOUNTER — HOSPITAL ENCOUNTER (OUTPATIENT)
Age: 39
Discharge: HOME | End: 2019-12-01
Payer: MEDICARE

## 2019-12-01 LAB
ANION GAP SERPL CALCULATED.3IONS-SCNC: 13 MEQ/L (ref 8–16)
BUN BLDV-MCNC: 8 MG/DL (ref 7–22)
CHLORIDE BLD-SCNC: 101 MEQ/L (ref 98–111)
CO2: 27 MEQ/L (ref 23–33)
CREAT SERPL-MCNC: 0.6 MG/DL (ref 0.4–1.2)
GFR SERPL CREATININE-BSD FRML MDRD: > 90 ML/MIN/1.73M2
POTASSIUM SERPL-SCNC: 3.9 MEQ/L (ref 3.5–5.2)
SODIUM BLD-SCNC: 141 MEQ/L (ref 135–145)

## 2019-12-01 PROCEDURE — 82565 ASSAY OF CREATININE: CPT

## 2019-12-01 PROCEDURE — 80051 ELECTROLYTE PANEL: CPT

## 2019-12-01 PROCEDURE — 84520 ASSAY OF UREA NITROGEN: CPT

## 2019-12-01 PROCEDURE — 36415 COLL VENOUS BLD VENIPUNCTURE: CPT

## 2019-12-13 ENCOUNTER — HOSPITAL ENCOUNTER (OUTPATIENT)
Age: 39
Discharge: HOME | End: 2019-12-13
Payer: MEDICARE

## 2019-12-13 LAB
ANION GAP SERPL CALCULATED.3IONS-SCNC: 12 MEQ/L (ref 8–16)
BASOPHILS # BLD: 0.7 %
BASOPHILS ABSOLUTE: 0 THOU/MM3 (ref 0–0.1)
BUN BLDV-MCNC: 9 MG/DL (ref 7–22)
CHLORIDE BLD-SCNC: 104 MEQ/L (ref 98–111)
CO2: 25 MEQ/L (ref 23–33)
CREAT SERPL-MCNC: 0.7 MG/DL (ref 0.4–1.2)
EOSINOPHIL # BLD: 13.3 %
EOSINOPHILS ABSOLUTE: 0.7 THOU/MM3 (ref 0–0.4)
ERYTHROCYTE [DISTWIDTH] IN BLOOD BY AUTOMATED COUNT: 12.3 % (ref 11.5–14.5)
ERYTHROCYTE [DISTWIDTH] IN BLOOD BY AUTOMATED COUNT: 44.4 FL (ref 35–45)
GFR SERPL CREATININE-BSD FRML MDRD: > 90 ML/MIN/1.73M2
HCT VFR BLD CALC: 42.2 % (ref 37–47)
HEMOGLOBIN: 13.9 GM/DL (ref 12–16)
IMMATURE GRANS (ABS): 0.01 THOU/MM3 (ref 0–0.07)
IMMATURE GRANULOCYTES: 0.2 %
LYMPHOCYTES # BLD: 48.5 %
LYMPHOCYTES ABSOLUTE: 2.7 THOU/MM3 (ref 1–4.8)
MCH RBC QN AUTO: 32.2 PG (ref 26–33)
MCHC RBC AUTO-ENTMCNC: 32.9 GM/DL (ref 32.2–35.5)
MCV RBC AUTO: 97.7 FL (ref 81–99)
MONOCYTES # BLD: 8.4 %
MONOCYTES ABSOLUTE: 0.5 THOU/MM3 (ref 0.4–1.3)
NUCLEATED RED BLOOD CELLS: 0 /100 WBC
PLATELET # BLD: 142 THOU/MM3 (ref 130–400)
PMV BLD AUTO: 11.5 FL (ref 9.4–12.4)
POTASSIUM SERPL-SCNC: 4.3 MEQ/L (ref 3.5–5.2)
RBC # BLD: 4.32 MILL/MM3 (ref 4.2–5.4)
SEG NEUTROPHILS: 28.9 %
SEGMENTED NEUTROPHILS ABSOLUTE COUNT: 1.6 THOU/MM3 (ref 1.8–7.7)
SODIUM BLD-SCNC: 141 MEQ/L (ref 135–145)
WBC # BLD: 5.6 THOU/MM3 (ref 4.8–10.8)

## 2019-12-13 PROCEDURE — 80051 ELECTROLYTE PANEL: CPT

## 2019-12-13 PROCEDURE — 36415 COLL VENOUS BLD VENIPUNCTURE: CPT

## 2019-12-13 PROCEDURE — 85025 COMPLETE CBC W/AUTO DIFF WBC: CPT

## 2019-12-13 PROCEDURE — 84520 ASSAY OF UREA NITROGEN: CPT

## 2019-12-13 PROCEDURE — 82565 ASSAY OF CREATININE: CPT

## 2019-12-31 ENCOUNTER — HOSPITAL ENCOUNTER (OUTPATIENT)
Age: 39
Discharge: HOME | End: 2019-12-31
Payer: MEDICARE

## 2019-12-31 ENCOUNTER — HOSPITAL ENCOUNTER (OUTPATIENT)
Dept: GENERAL RADIOLOGY | Age: 39
Discharge: HOME | End: 2019-12-31
Payer: MEDICARE

## 2019-12-31 VITALS
BODY MASS INDEX: 19.99 KG/M2 | WEIGHT: 120 LBS | SYSTOLIC BLOOD PRESSURE: 106 MMHG | OXYGEN SATURATION: 98 % | DIASTOLIC BLOOD PRESSURE: 59 MMHG | TEMPERATURE: 98.8 F | RESPIRATION RATE: 63 BRPM | HEIGHT: 65 IN

## 2019-12-31 DIAGNOSIS — K70.31 ALCOHOLIC CIRRHOSIS OF LIVER WITH ASCITES (HCC): Primary | ICD-10-CM

## 2019-12-31 LAB
BASOPHILS # BLD: 0.6 %
BASOPHILS ABSOLUTE: 0 THOU/MM3 (ref 0–0.1)
EOSINOPHIL # BLD: 12.1 %
EOSINOPHILS ABSOLUTE: 0.8 THOU/MM3 (ref 0–0.4)
ERYTHROCYTE [DISTWIDTH] IN BLOOD BY AUTOMATED COUNT: 12.1 % (ref 11.5–14.5)
ERYTHROCYTE [DISTWIDTH] IN BLOOD BY AUTOMATED COUNT: 43.1 FL (ref 35–45)
HCT VFR BLD CALC: 40 % (ref 37–47)
HEMOGLOBIN: 13 GM/DL (ref 12–16)
IMMATURE GRANS (ABS): 0.01 THOU/MM3 (ref 0–0.07)
IMMATURE GRANULOCYTES: 0.2 %
LYMPHOCYTES # BLD: 37.2 %
LYMPHOCYTES ABSOLUTE: 2.4 THOU/MM3 (ref 1–4.8)
MCH RBC QN AUTO: 31.6 PG (ref 26–33)
MCHC RBC AUTO-ENTMCNC: 32.5 GM/DL (ref 32.2–35.5)
MCV RBC AUTO: 97.1 FL (ref 81–99)
MONOCYTES # BLD: 9.1 %
MONOCYTES ABSOLUTE: 0.6 THOU/MM3 (ref 0.4–1.3)
NUCLEATED RED BLOOD CELLS: 0 /100 WBC
PLATELET # BLD: 123 THOU/MM3 (ref 130–400)
PMV BLD AUTO: 11.4 FL (ref 9.4–12.4)
RBC # BLD: 4.12 MILL/MM3 (ref 4.2–5.4)
SEG NEUTROPHILS: 40.8 %
SEGMENTED NEUTROPHILS ABSOLUTE COUNT: 2.7 THOU/MM3 (ref 1.8–7.7)
WBC # BLD: 6.5 THOU/MM3 (ref 4.8–10.8)

## 2019-12-31 PROCEDURE — 6360000002 HC RX W HCPCS: Performed by: NURSE PRACTITIONER

## 2019-12-31 PROCEDURE — 90471 IMMUNIZATION ADMIN: CPT | Performed by: NURSE PRACTITIONER

## 2019-12-31 PROCEDURE — 36415 COLL VENOUS BLD VENIPUNCTURE: CPT

## 2019-12-31 PROCEDURE — 96372 THER/PROPH/DIAG INJ SC/IM: CPT

## 2019-12-31 PROCEDURE — 85025 COMPLETE CBC W/AUTO DIFF WBC: CPT

## 2019-12-31 PROCEDURE — 90632 HEPA VACCINE ADULT IM: CPT | Performed by: NURSE PRACTITIONER

## 2019-12-31 RX ADMIN — HEPATITIS A VACCINE 1440 UNITS: 1440 INJECTION, SUSPENSION INTRAMUSCULAR at 12:45

## 2019-12-31 NOTE — PROGRESS NOTES
Pt here for outpatient nursing injection. Respirations regular and easy. Gait steady. Pt alert and oriented X's 3. Taken to triage for hepatitis A vaccine. Ezekiel Webb

## 2020-01-15 ENCOUNTER — HOSPITAL ENCOUNTER (OUTPATIENT)
Age: 40
Discharge: HOME | End: 2020-01-15
Payer: MEDICARE

## 2020-01-15 LAB
ANION GAP SERPL CALCULATED.3IONS-SCNC: 12 MEQ/L (ref 8–16)
BASOPHILS # BLD: 0.8 %
BASOPHILS ABSOLUTE: 0 THOU/MM3 (ref 0–0.1)
BUN BLDV-MCNC: 10 MG/DL (ref 7–22)
CHLORIDE BLD-SCNC: 103 MEQ/L (ref 98–111)
CO2: 26 MEQ/L (ref 23–33)
CREAT SERPL-MCNC: 0.8 MG/DL (ref 0.4–1.2)
EOSINOPHIL # BLD: 14.5 %
EOSINOPHILS ABSOLUTE: 0.7 THOU/MM3 (ref 0–0.4)
ERYTHROCYTE [DISTWIDTH] IN BLOOD BY AUTOMATED COUNT: 11.8 % (ref 11.5–14.5)
ERYTHROCYTE [DISTWIDTH] IN BLOOD BY AUTOMATED COUNT: 41.9 FL (ref 35–45)
GFR SERPL CREATININE-BSD FRML MDRD: 80 ML/MIN/1.73M2
HCT VFR BLD CALC: 42.2 % (ref 37–47)
HEMOGLOBIN: 14 GM/DL (ref 12–16)
IMMATURE GRANS (ABS): 0.01 THOU/MM3 (ref 0–0.07)
IMMATURE GRANULOCYTES: 0.2 %
LYMPHOCYTES # BLD: 41.4 %
LYMPHOCYTES ABSOLUTE: 2 THOU/MM3 (ref 1–4.8)
MCH RBC QN AUTO: 32.2 PG (ref 26–33)
MCHC RBC AUTO-ENTMCNC: 33.2 GM/DL (ref 32.2–35.5)
MCV RBC AUTO: 97 FL (ref 81–99)
MONOCYTES # BLD: 6.6 %
MONOCYTES ABSOLUTE: 0.3 THOU/MM3 (ref 0.4–1.3)
NUCLEATED RED BLOOD CELLS: 0 /100 WBC
PLATELET # BLD: 151 THOU/MM3 (ref 130–400)
PMV BLD AUTO: 11.5 FL (ref 9.4–12.4)
POTASSIUM SERPL-SCNC: 3.9 MEQ/L (ref 3.5–5.2)
RBC # BLD: 4.35 MILL/MM3 (ref 4.2–5.4)
SEG NEUTROPHILS: 36.5 %
SEGMENTED NEUTROPHILS ABSOLUTE COUNT: 1.8 THOU/MM3 (ref 1.8–7.7)
SODIUM BLD-SCNC: 141 MEQ/L (ref 135–145)
WBC # BLD: 4.8 THOU/MM3 (ref 4.8–10.8)

## 2020-01-15 PROCEDURE — 36415 COLL VENOUS BLD VENIPUNCTURE: CPT

## 2020-01-15 PROCEDURE — 82565 ASSAY OF CREATININE: CPT

## 2020-01-15 PROCEDURE — 84520 ASSAY OF UREA NITROGEN: CPT

## 2020-01-15 PROCEDURE — 80051 ELECTROLYTE PANEL: CPT

## 2020-01-15 PROCEDURE — 85025 COMPLETE CBC W/AUTO DIFF WBC: CPT

## 2020-01-26 ENCOUNTER — HOSPITAL ENCOUNTER (OUTPATIENT)
Age: 40
Discharge: HOME | End: 2020-01-26
Payer: MEDICARE

## 2020-01-26 LAB
ANION GAP SERPL CALCULATED.3IONS-SCNC: 14 MEQ/L (ref 8–16)
BUN BLDV-MCNC: 10 MG/DL (ref 7–22)
CHLORIDE BLD-SCNC: 105 MEQ/L (ref 98–111)
CO2: 23 MEQ/L (ref 23–33)
CREAT SERPL-MCNC: 0.7 MG/DL (ref 0.4–1.2)
GFR SERPL CREATININE-BSD FRML MDRD: > 90 ML/MIN/1.73M2
POTASSIUM SERPL-SCNC: 3.8 MEQ/L (ref 3.5–5.2)
SODIUM BLD-SCNC: 142 MEQ/L (ref 135–145)

## 2020-01-26 PROCEDURE — 84520 ASSAY OF UREA NITROGEN: CPT

## 2020-01-26 PROCEDURE — 80051 ELECTROLYTE PANEL: CPT

## 2020-01-26 PROCEDURE — 36415 COLL VENOUS BLD VENIPUNCTURE: CPT

## 2020-01-26 PROCEDURE — 82565 ASSAY OF CREATININE: CPT

## 2020-02-24 ENCOUNTER — NURSE ONLY (OUTPATIENT)
Dept: LAB | Age: 40
End: 2020-02-24

## 2020-02-24 LAB
ANION GAP SERPL CALCULATED.3IONS-SCNC: 11 MEQ/L (ref 8–16)
BUN BLDV-MCNC: 8 MG/DL (ref 7–22)
CHLORIDE BLD-SCNC: 106 MEQ/L (ref 98–111)
CO2: 26 MEQ/L (ref 23–33)
CREAT SERPL-MCNC: 0.6 MG/DL (ref 0.4–1.2)
GFR SERPL CREATININE-BSD FRML MDRD: > 90 ML/MIN/1.73M2
POTASSIUM SERPL-SCNC: 5 MEQ/L (ref 3.5–5.2)
SODIUM BLD-SCNC: 143 MEQ/L (ref 135–145)

## 2020-02-25 LAB
BASOPHILS # BLD: 0.3 % (ref 0–3)
EOSINOPHILS RELATIVE PERCENT: 8.2 % (ref 0–4)
HCT VFR BLD CALC: 40.5 % (ref 37–47)
HEMOGLOBIN: 13.2 GM/DL (ref 12–16)
LYMPHOCYTES # BLD: 41.9 % (ref 15–47)
MCH RBC QN AUTO: 30.9 PG (ref 27–31)
MCHC RBC AUTO-ENTMCNC: 32.6 GM/DL (ref 33–37)
MCV RBC AUTO: 94.7 FL (ref 81–99)
MONOCYTES: 5.7 % (ref 0–12)
PDW BLD-RTO: 12.4 % (ref 11.5–14.5)
PLATELET # BLD: 117 THOU/MM3 (ref 130–400)
PLATELET ESTIMATE: ABNORMAL
PMV BLD AUTO: 9.2 FL (ref 7.4–10.4)
POIKILOCYTES: ABNORMAL
RBC # BLD: 4.28 MILL/MM3 (ref 4.2–5.4)
RBC # BLD: ABNORMAL 10*6/UL
SCAN OF BLOOD SMEAR: NORMAL
SEGS: 43.9 % (ref 43–75)
WBC # BLD: 6 THOU/MM3 (ref 4.8–10.8)

## 2020-03-25 PROBLEM — E44.0 MODERATE MALNUTRITION (HCC): Status: RESOLVED | Noted: 2018-02-19 | Resolved: 2020-03-24

## 2020-03-25 PROBLEM — E87.6 HYPOKALEMIA: Status: RESOLVED | Noted: 2019-01-14 | Resolved: 2020-03-24

## 2020-05-26 ENCOUNTER — HOSPITAL ENCOUNTER (OUTPATIENT)
Age: 40
Discharge: HOME | End: 2020-05-26
Payer: MEDICARE

## 2020-05-26 LAB
BASOPHILS # BLD: 0.6 %
BASOPHILS ABSOLUTE: 0 THOU/MM3 (ref 0–0.1)
EOSINOPHIL # BLD: 11.6 %
EOSINOPHILS ABSOLUTE: 0.8 THOU/MM3 (ref 0–0.4)
ERYTHROCYTE [DISTWIDTH] IN BLOOD BY AUTOMATED COUNT: 11.6 % (ref 11.5–14.5)
ERYTHROCYTE [DISTWIDTH] IN BLOOD BY AUTOMATED COUNT: 40.6 FL (ref 35–45)
HCT VFR BLD CALC: 42.4 % (ref 37–47)
HEMOGLOBIN: 13.8 GM/DL (ref 12–16)
IMMATURE GRANS (ABS): 0.01 THOU/MM3 (ref 0–0.07)
IMMATURE GRANULOCYTES: 0.1 %
LYMPHOCYTES # BLD: 44.3 %
LYMPHOCYTES ABSOLUTE: 3.1 THOU/MM3 (ref 1–4.8)
MCH RBC QN AUTO: 31.1 PG (ref 26–33)
MCHC RBC AUTO-ENTMCNC: 32.5 GM/DL (ref 32.2–35.5)
MCV RBC AUTO: 95.5 FL (ref 81–99)
MONOCYTES # BLD: 8.2 %
MONOCYTES ABSOLUTE: 0.6 THOU/MM3 (ref 0.4–1.3)
NUCLEATED RED BLOOD CELLS: 0 /100 WBC
PLATELET # BLD: 136 THOU/MM3 (ref 130–400)
PMV BLD AUTO: 11.5 FL (ref 9.4–12.4)
RBC # BLD: 4.44 MILL/MM3 (ref 4.2–5.4)
SEG NEUTROPHILS: 35.2 %
SEGMENTED NEUTROPHILS ABSOLUTE COUNT: 2.5 THOU/MM3 (ref 1.8–7.7)
WBC # BLD: 7 THOU/MM3 (ref 4.8–10.8)

## 2020-05-26 PROCEDURE — 36415 COLL VENOUS BLD VENIPUNCTURE: CPT

## 2020-05-26 PROCEDURE — 85025 COMPLETE CBC W/AUTO DIFF WBC: CPT

## 2020-06-23 ENCOUNTER — HOSPITAL ENCOUNTER (OUTPATIENT)
Age: 40
Discharge: HOME | End: 2020-06-23
Payer: MEDICARE

## 2020-06-23 LAB
ANION GAP SERPL CALCULATED.3IONS-SCNC: 14 MEQ/L (ref 8–16)
BUN BLDV-MCNC: 11 MG/DL (ref 7–22)
CHLORIDE BLD-SCNC: 103 MEQ/L (ref 98–111)
CO2: 25 MEQ/L (ref 23–33)
CREAT SERPL-MCNC: 0.7 MG/DL (ref 0.4–1.2)
GFR SERPL CREATININE-BSD FRML MDRD: > 90 ML/MIN/1.73M2
POTASSIUM SERPL-SCNC: 4.5 MEQ/L (ref 3.5–5.2)
SODIUM BLD-SCNC: 142 MEQ/L (ref 135–145)

## 2020-06-23 PROCEDURE — 80051 ELECTROLYTE PANEL: CPT

## 2020-06-23 PROCEDURE — 84520 ASSAY OF UREA NITROGEN: CPT

## 2020-06-23 PROCEDURE — 82565 ASSAY OF CREATININE: CPT

## 2020-06-23 PROCEDURE — 36415 COLL VENOUS BLD VENIPUNCTURE: CPT

## 2020-06-25 ENCOUNTER — HOSPITAL ENCOUNTER (OUTPATIENT)
Dept: ULTRASOUND IMAGING | Age: 40
Discharge: HOME | End: 2020-06-25
Payer: MEDICARE

## 2020-06-25 LAB
ALBUMIN SERPL-MCNC: 4.2 G/DL (ref 3.5–5.1)
ALP BLD-CCNC: 131 U/L (ref 38–126)
ALT SERPL-CCNC: 33 U/L (ref 11–66)
AMMONIA: 30 UMOL/L (ref 11–60)
ANION GAP SERPL CALCULATED.3IONS-SCNC: 12 MEQ/L (ref 8–16)
AST SERPL-CCNC: 33 U/L (ref 5–40)
BASOPHILS # BLD: 0.6 %
BASOPHILS ABSOLUTE: 0 THOU/MM3 (ref 0–0.1)
BILIRUB SERPL-MCNC: 0.8 MG/DL (ref 0.3–1.2)
BUN BLDV-MCNC: 11 MG/DL (ref 7–22)
CALCIUM SERPL-MCNC: 9.2 MG/DL (ref 8.5–10.5)
CHLORIDE BLD-SCNC: 100 MEQ/L (ref 98–111)
CO2: 29 MEQ/L (ref 23–33)
CREAT SERPL-MCNC: 0.7 MG/DL (ref 0.4–1.2)
EOSINOPHIL # BLD: 8.1 %
EOSINOPHILS ABSOLUTE: 0.4 THOU/MM3 (ref 0–0.4)
ERYTHROCYTE [DISTWIDTH] IN BLOOD BY AUTOMATED COUNT: 11.9 % (ref 11.5–14.5)
ERYTHROCYTE [DISTWIDTH] IN BLOOD BY AUTOMATED COUNT: 41.6 FL (ref 35–45)
GFR SERPL CREATININE-BSD FRML MDRD: > 90 ML/MIN/1.73M2
GLUCOSE BLD-MCNC: 100 MG/DL (ref 70–108)
HCT VFR BLD CALC: 40.8 % (ref 37–47)
HEMOGLOBIN: 13.4 GM/DL (ref 12–16)
IMMATURE GRANS (ABS): 0.01 THOU/MM3 (ref 0–0.07)
IMMATURE GRANULOCYTES: 0.2 %
INR BLD: 1.4 (ref 0.85–1.13)
LYMPHOCYTES # BLD: 49.2 %
LYMPHOCYTES ABSOLUTE: 2.7 THOU/MM3 (ref 1–4.8)
MCH RBC QN AUTO: 31.5 PG (ref 26–33)
MCHC RBC AUTO-ENTMCNC: 32.8 GM/DL (ref 32.2–35.5)
MCV RBC AUTO: 95.8 FL (ref 81–99)
MONOCYTES # BLD: 6.8 %
MONOCYTES ABSOLUTE: 0.4 THOU/MM3 (ref 0.4–1.3)
NUCLEATED RED BLOOD CELLS: 0 /100 WBC
PLATELET # BLD: 151 THOU/MM3 (ref 130–400)
PMV BLD AUTO: 11.5 FL (ref 9.4–12.4)
POTASSIUM SERPL-SCNC: 4.4 MEQ/L (ref 3.5–5.2)
RBC # BLD: 4.26 MILL/MM3 (ref 4.2–5.4)
SEG NEUTROPHILS: 35.1 %
SEGMENTED NEUTROPHILS ABSOLUTE COUNT: 1.9 THOU/MM3 (ref 1.8–7.7)
SODIUM BLD-SCNC: 141 MEQ/L (ref 135–145)
TOTAL PROTEIN: 6.8 G/DL (ref 6.1–8)
WBC # BLD: 5.4 THOU/MM3 (ref 4.8–10.8)

## 2020-06-25 PROCEDURE — 82140 ASSAY OF AMMONIA: CPT

## 2020-06-25 PROCEDURE — 76705 ECHO EXAM OF ABDOMEN: CPT

## 2020-06-25 PROCEDURE — 36415 COLL VENOUS BLD VENIPUNCTURE: CPT

## 2020-06-25 PROCEDURE — 85025 COMPLETE CBC W/AUTO DIFF WBC: CPT

## 2020-06-25 PROCEDURE — 82105 ALPHA-FETOPROTEIN SERUM: CPT

## 2020-06-25 PROCEDURE — 93975 VASCULAR STUDY: CPT

## 2020-06-25 PROCEDURE — 80053 COMPREHEN METABOLIC PANEL: CPT

## 2020-06-25 PROCEDURE — 85610 PROTHROMBIN TIME: CPT

## 2020-06-29 LAB — AFP-TUMOR MARKER: 1.8 UG/L

## 2020-07-15 ENCOUNTER — HOSPITAL ENCOUNTER (OUTPATIENT)
Age: 40
Discharge: HOME | End: 2020-07-15
Payer: MEDICARE

## 2020-07-15 LAB
ANION GAP SERPL CALCULATED.3IONS-SCNC: 13 MEQ/L (ref 8–16)
BUN BLDV-MCNC: 9 MG/DL (ref 7–22)
CHLORIDE BLD-SCNC: 100 MEQ/L (ref 98–111)
CO2: 28 MEQ/L (ref 23–33)
CREAT SERPL-MCNC: 0.6 MG/DL (ref 0.4–1.2)
GFR SERPL CREATININE-BSD FRML MDRD: > 90 ML/MIN/1.73M2
POTASSIUM SERPL-SCNC: 4 MEQ/L (ref 3.5–5.2)
SODIUM BLD-SCNC: 141 MEQ/L (ref 135–145)

## 2020-07-15 PROCEDURE — 36415 COLL VENOUS BLD VENIPUNCTURE: CPT

## 2020-07-15 PROCEDURE — 84520 ASSAY OF UREA NITROGEN: CPT

## 2020-07-15 PROCEDURE — 82565 ASSAY OF CREATININE: CPT

## 2020-07-15 PROCEDURE — 80051 ELECTROLYTE PANEL: CPT

## 2020-07-29 ENCOUNTER — HOSPITAL ENCOUNTER (OUTPATIENT)
Age: 40
Discharge: HOME | End: 2020-07-29
Payer: MEDICARE

## 2020-07-29 LAB
ANION GAP SERPL CALCULATED.3IONS-SCNC: 10 MEQ/L (ref 8–16)
BUN BLDV-MCNC: 9 MG/DL (ref 7–22)
CHLORIDE BLD-SCNC: 106 MEQ/L (ref 98–111)
CO2: 25 MEQ/L (ref 23–33)
CREAT SERPL-MCNC: 0.6 MG/DL (ref 0.4–1.2)
GFR SERPL CREATININE-BSD FRML MDRD: > 90 ML/MIN/1.73M2
POTASSIUM SERPL-SCNC: 4.4 MEQ/L (ref 3.5–5.2)
SODIUM BLD-SCNC: 141 MEQ/L (ref 135–145)

## 2020-07-29 PROCEDURE — 84520 ASSAY OF UREA NITROGEN: CPT

## 2020-07-29 PROCEDURE — 36415 COLL VENOUS BLD VENIPUNCTURE: CPT

## 2020-07-29 PROCEDURE — 80051 ELECTROLYTE PANEL: CPT

## 2020-07-29 PROCEDURE — 82565 ASSAY OF CREATININE: CPT

## 2020-08-12 ENCOUNTER — HOSPITAL ENCOUNTER (OUTPATIENT)
Age: 40
Discharge: HOME | End: 2020-08-12
Payer: MEDICARE

## 2020-08-12 LAB
ANION GAP: 5 MEQ/L (ref 8–16)
BUN BLDV-MCNC: 10 MG/DL (ref 7–18)
CHLORIDE BLD-SCNC: 106 MEQ/L (ref 98–107)
CO2: 31 MEQ/L (ref 21–32)
CREAT SERPL-MCNC: 0.8 MG/DL (ref 0.6–1.3)
GFR, ESTIMATED: 85 ML/MIN/1.73M2
POTASSIUM SERPL-SCNC: 4.5 MEQ/L (ref 3.5–5.1)
SODIUM BLD-SCNC: 142 MEQ/L (ref 136–145)

## 2020-08-12 PROCEDURE — 82565 ASSAY OF CREATININE: CPT

## 2020-08-12 PROCEDURE — 84520 ASSAY OF UREA NITROGEN: CPT

## 2020-08-12 PROCEDURE — 36415 COLL VENOUS BLD VENIPUNCTURE: CPT

## 2020-08-12 PROCEDURE — 80051 ELECTROLYTE PANEL: CPT

## 2021-12-27 ENCOUNTER — HOSPITAL ENCOUNTER (OUTPATIENT)
Age: 41
Discharge: HOME | End: 2021-12-27

## 2021-12-28 ENCOUNTER — HOSPITAL ENCOUNTER (OUTPATIENT)
Age: 41
Discharge: HOME | End: 2021-12-28
Payer: MEDICARE

## 2021-12-28 LAB
ALBUMIN SERPL-MCNC: 4.2 G/DL (ref 3.5–5.1)
ALP BLD-CCNC: 80 U/L (ref 38–126)
ALT SERPL-CCNC: 37 U/L (ref 11–66)
ANION GAP SERPL CALCULATED.3IONS-SCNC: 8 MEQ/L (ref 8–16)
AST SERPL-CCNC: 31 U/L (ref 5–40)
BASOPHILS # BLD: 0.6 %
BASOPHILS ABSOLUTE: 0 THOU/MM3 (ref 0–0.1)
BILIRUB SERPL-MCNC: 0.4 MG/DL (ref 0.3–1.2)
BUN BLDV-MCNC: 13 MG/DL (ref 7–22)
CALCIUM SERPL-MCNC: 8.9 MG/DL (ref 8.5–10.5)
CHLORIDE BLD-SCNC: 108 MEQ/L (ref 98–111)
CHOLESTEROL, FASTING: 157 MG/DL (ref 100–199)
CO2: 25 MEQ/L (ref 23–33)
CREAT SERPL-MCNC: 0.8 MG/DL (ref 0.4–1.2)
EOSINOPHIL # BLD: 5.4 %
EOSINOPHILS ABSOLUTE: 0.3 THOU/MM3 (ref 0–0.4)
ERYTHROCYTE [DISTWIDTH] IN BLOOD BY AUTOMATED COUNT: 11.6 % (ref 11.5–14.5)
ERYTHROCYTE [DISTWIDTH] IN BLOOD BY AUTOMATED COUNT: 38.9 FL (ref 35–45)
GFR SERPL CREATININE-BSD FRML MDRD: 79 ML/MIN/1.73M2
GLUCOSE FASTING: 114 MG/DL (ref 70–108)
HCT VFR BLD CALC: 37 % (ref 37–47)
HDLC SERPL-MCNC: 44 MG/DL
HEMOGLOBIN: 11.8 GM/DL (ref 12–16)
IMMATURE GRANS (ABS): 0.01 THOU/MM3 (ref 0–0.07)
IMMATURE GRANULOCYTES: 0.2 %
LDL CHOLESTEROL CALCULATED: 100 MG/DL
LYMPHOCYTES # BLD: 37.7 %
LYMPHOCYTES ABSOLUTE: 1.9 THOU/MM3 (ref 1–4.8)
MCH RBC QN AUTO: 29.3 PG (ref 26–33)
MCHC RBC AUTO-ENTMCNC: 31.9 GM/DL (ref 32.2–35.5)
MCV RBC AUTO: 91.8 FL (ref 81–99)
MONOCYTES # BLD: 9.5 %
MONOCYTES ABSOLUTE: 0.5 THOU/MM3 (ref 0.4–1.3)
NUCLEATED RED BLOOD CELLS: 0 /100 WBC
PLATELET # BLD: 167 THOU/MM3 (ref 130–400)
PMV BLD AUTO: 11.1 FL (ref 9.4–12.4)
POTASSIUM SERPL-SCNC: 4.2 MEQ/L (ref 3.5–5.2)
RBC # BLD: 4.03 MILL/MM3 (ref 4.2–5.4)
SEG NEUTROPHILS: 46.6 %
SEGMENTED NEUTROPHILS ABSOLUTE COUNT: 2.3 THOU/MM3 (ref 1.8–7.7)
SODIUM BLD-SCNC: 141 MEQ/L (ref 135–145)
TOTAL PROTEIN: 6.2 G/DL (ref 6.1–8)
TRIGLYCERIDE, FASTING: 67 MG/DL (ref 0–199)
WBC # BLD: 5 THOU/MM3 (ref 4.8–10.8)

## 2021-12-28 PROCEDURE — 36415 COLL VENOUS BLD VENIPUNCTURE: CPT

## 2021-12-28 PROCEDURE — 80061 LIPID PANEL: CPT

## 2021-12-28 PROCEDURE — 80053 COMPREHEN METABOLIC PANEL: CPT

## 2021-12-28 PROCEDURE — 85025 COMPLETE CBC W/AUTO DIFF WBC: CPT

## 2022-01-13 ENCOUNTER — HOSPITAL ENCOUNTER (EMERGENCY)
Age: 42
Discharge: HOME | End: 2022-01-13
Attending: FAMILY MEDICINE
Payer: MEDICARE

## 2022-01-13 VITALS
RESPIRATION RATE: 18 BRPM | SYSTOLIC BLOOD PRESSURE: 115 MMHG | WEIGHT: 150 LBS | HEIGHT: 65 IN | OXYGEN SATURATION: 100 % | HEART RATE: 85 BPM | TEMPERATURE: 97.3 F | DIASTOLIC BLOOD PRESSURE: 70 MMHG | BODY MASS INDEX: 24.99 KG/M2

## 2022-01-13 DIAGNOSIS — J06.9 VIRAL URI: Primary | ICD-10-CM

## 2022-01-13 LAB — SARS-COV-2, NAAT: NOT  DETECTED

## 2022-01-13 PROCEDURE — 87635 SARS-COV-2 COVID-19 AMP PRB: CPT

## 2022-01-13 PROCEDURE — 99284 EMERGENCY DEPT VISIT MOD MDM: CPT

## 2022-01-13 ASSESSMENT — ENCOUNTER SYMPTOMS
BACK PAIN: 0
SINUS PRESSURE: 1
ABDOMINAL PAIN: 0
DIARRHEA: 0
VOMITING: 0
EYE DISCHARGE: 0
NAUSEA: 0
EYE REDNESS: 0
WHEEZING: 0
RHINORRHEA: 1
SORE THROAT: 0
COUGH: 1
SHORTNESS OF BREATH: 0

## 2022-01-13 ASSESSMENT — PAIN DESCRIPTION - PAIN TYPE: TYPE: ACUTE PAIN

## 2022-01-13 ASSESSMENT — PAIN SCALES - GENERAL: PAINLEVEL_OUTOF10: 5

## 2022-01-13 ASSESSMENT — PAIN DESCRIPTION - LOCATION: LOCATION: HEAD
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
## 2024-06-10 ENCOUNTER — HOSPITAL ENCOUNTER (EMERGENCY)
Age: 44
Discharge: HOME | End: 2024-06-10
Attending: EMERGENCY MEDICINE
Payer: MEDICARE

## 2024-06-10 VITALS
BODY MASS INDEX: 23.99 KG/M2 | DIASTOLIC BLOOD PRESSURE: 71 MMHG | TEMPERATURE: 97.4 F | OXYGEN SATURATION: 100 % | SYSTOLIC BLOOD PRESSURE: 143 MMHG | RESPIRATION RATE: 20 BRPM | WEIGHT: 144 LBS | HEIGHT: 65 IN | HEART RATE: 88 BPM

## 2024-06-10 DIAGNOSIS — Z78.9 ALCOHOL USE: ICD-10-CM

## 2024-06-10 DIAGNOSIS — R53.81 MALAISE: Primary | ICD-10-CM

## 2024-06-10 DIAGNOSIS — R11.0 NAUSEA: ICD-10-CM

## 2024-06-10 DIAGNOSIS — Z87.19 HISTORY OF CIRRHOSIS: ICD-10-CM

## 2024-06-10 LAB
ALBUMIN SERPL BCP-MCNC: 4.5 GM/DL (ref 3.4–5)
ALP SERPL-CCNC: 78 U/L (ref 46–116)
ALT SERPL W P-5'-P-CCNC: 58 U/L (ref 14–63)
AMORPH SED URNS QL MICRO: NORMAL
AMPHETAMINE+METHAMPHETAMIE: NEGATIVE
ANALYZED BY:: NORMAL
ANION GAP SERPL CALC-SCNC: 14 MEQ/L (ref 8–16)
AST SERPL W P-5'-P-CCNC: 58 U/L (ref 15–37)
BACTERIA URNS QL MICRO: NORMAL
BARBITURATES: NEGATIVE
BASOPHILS # BLD: 0.1 % (ref 0–3)
BASOPHILS ABSOLUTE: 0 THOU/MM3 (ref 0–0.1)
BENZODIAZEPINES: NEGATIVE
BILIRUB SERPL-MCNC: 0.9 MG/DL (ref 0.2–1)
BILIRUB UR QL STRIP.AUTO: NEGATIVE
BUN SERPL-MCNC: 6 MG/DL (ref 7–18)
CALCIUM SERPL-MCNC: 9.1 MG/DL (ref 8.5–10.1)
CANNABINOIDS: NEGATIVE
CASTS #/AREA URNS LPF: NORMAL /LPF
CHARACTER UR: CLEAR
CHLORIDE SERPL-SCNC: 98 MEQ/L (ref 98–107)
CO2 SERPL-SCNC: 25 MEQ/L (ref 21–32)
COCAINE METABOLITE: NEGATIVE
COLOR UR AUTO: NORMAL
CREAT SERPL-MCNC: 0.9 MG/DL (ref 0.6–1.3)
DATE OF COLLECTION: NORMAL
DRAWN BY: NORMAL
EOSINOPHILS ABSOLUTE: 0 THOU/MM3 (ref 0–0.5)
EOSINOPHILS RELATIVE PERCENT: 0.3 % (ref 0–4)
EPI CELLS #/AREA URNS HPF: NORMAL /HPF
ETHANOL SERPL-MCNC: 0.24 % (GM/DL) (ref 0–0.08)
GFR SERPL CREATININE-BSD FRML MDRD: 81 ML/MIN/1.73M2
GLUCOSE SERPL-MCNC: 92 MG/DL (ref 74–106)
GLUCOSE UR QL STRIP.AUTO: NEGATIVE MG/DL
HCT VFR BLD CALC: 42.7 % (ref 37–47)
HEMOGLOBIN: 14.8 GM/DL (ref 12–16)
HGB UR STRIP.AUTO-MCNC: NEGATIVE MG/L
IMMATURE GRANS (ABS): 0 THOU/MM3 (ref 0–0.07)
IMMATURE GRANULOCYTES %: 0 %
KETONES UR QL STRIP.AUTO: 40
LEUKOCYTE ESTERASE UR QL STRIP.AUTO: NEGATIVE
LIPASE SERPL-CCNC: 43 U/L (ref 16–77)
LYMPHOCYTES # BLD AUTO: 17.1 % (ref 15–47)
LYMPHOCYTES ABSOLUTE: 1.2 THOU/MM3 (ref 1–4.8)
Lab: 1835
Lab: NORMAL
MAGNESIUM SERPL-MCNC: 1.4 MG/DL (ref 1.8–2.4)
MCH RBC QN AUTO: 30.6 PG (ref 26–32)
MCHC RBC AUTO-ENTMCNC: 34.7 GM/DL (ref 31–35)
MCV RBC AUTO: 88.2 FL (ref 81–99)
MONOCYTES: 0.2 THOU/MM3 (ref 0.3–1.3)
MONOCYTES: 2.8 % (ref 0–12)
MUCOUS THREADS URNS QL MICRO: NORMAL
NEUTROPHILS ABSOLUTE: 5.7 THOU/MM3 (ref 1.8–7.7)
NITRITE UR QL STRIP.AUTO: NEGATIVE
OPIATES: NEGATIVE
OXYCODONE: NEGATIVE
PDW BLD-RTO: 11.6 % (ref 11.5–14.9)
PH UR STRIP.AUTO: 6 [PH] (ref 5–9)
PHENCYCLIDINE: NEGATIVE
PLATELET # BLD AUTO: 189 THOU/MM3 (ref 130–400)
PMV BLD AUTO: 9.8 FL (ref 9.4–12.4)
POTASSIUM SERPL-SCNC: 3.6 MEQ/L (ref 3.5–5.1)
PROT SERPL-MCNC: 7.7 GM/DL (ref 6.4–8.2)
PROT UR STRIP.AUTO-MCNC: NEGATIVE MG/DL
RBC # BLD: 4.84 MILL/MM3 (ref 4.1–5.3)
RBC #/AREA URNS HPF: NORMAL /HPF
REFLEX TO URINE C & S: NORMAL
SEG NEUTROPHILS: 79.6 % (ref 43–75)
SODIUM SERPL-SCNC: 137 MEQ/L (ref 136–145)
SP GR UR STRIP.AUTO: 1.02 (ref 1–1.03)
UROBILINOGEN UR STRIP-ACNC: 0.2 EU/DL (ref 0–1)
WBC # BLD: 7.1 THOU/MM3 (ref 4.8–10.8)
WBC # UR STRIP.AUTO: NORMAL /HPF

## 2024-06-10 PROCEDURE — 80305 DRUG TEST PRSMV DIR OPT OBS: CPT

## 2024-06-10 PROCEDURE — 80053 COMPREHEN METABOLIC PANEL: CPT

## 2024-06-10 PROCEDURE — 96374 THER/PROPH/DIAG INJ IV PUSH: CPT

## 2024-06-10 PROCEDURE — 82077 ASSAY SPEC XCP UR&BREATH IA: CPT

## 2024-06-10 PROCEDURE — 99284 EMERGENCY DEPT VISIT MOD MDM: CPT

## 2024-06-10 PROCEDURE — 6360000002 HC RX W HCPCS: Performed by: EMERGENCY MEDICINE

## 2024-06-10 PROCEDURE — 85025 COMPLETE CBC W/AUTO DIFF WBC: CPT

## 2024-06-10 PROCEDURE — 81001 URINALYSIS AUTO W/SCOPE: CPT

## 2024-06-10 PROCEDURE — 2580000003 HC RX 258: Performed by: EMERGENCY MEDICINE

## 2024-06-10 PROCEDURE — 83735 ASSAY OF MAGNESIUM: CPT

## 2024-06-10 PROCEDURE — 83690 ASSAY OF LIPASE: CPT

## 2024-06-10 RX ORDER — 0.9 % SODIUM CHLORIDE 0.9 %
1000 INTRAVENOUS SOLUTION INTRAVENOUS ONCE
Status: COMPLETED | OUTPATIENT
Start: 2024-06-10 | End: 2024-06-10

## 2024-06-10 RX ORDER — ONDANSETRON 2 MG/ML
4 INJECTION INTRAMUSCULAR; INTRAVENOUS ONCE
Status: COMPLETED | OUTPATIENT
Start: 2024-06-10 | End: 2024-06-10

## 2024-06-10 RX ORDER — BUSPIRONE HYDROCHLORIDE 15 MG/1
15 TABLET ORAL 3 TIMES DAILY
COMMUNITY

## 2024-06-10 RX ORDER — SERTRALINE HYDROCHLORIDE 25 MG/1
25 TABLET, FILM COATED ORAL DAILY
COMMUNITY

## 2024-06-10 RX ORDER — LANOLIN ALCOHOL/MO/W.PET/CERES
10 CREAM (GRAM) TOPICAL DAILY
COMMUNITY

## 2024-06-10 RX ORDER — ONDANSETRON 4 MG/1
4 TABLET, FILM COATED ORAL EVERY 8 HOURS PRN
Qty: 12 TABLET | Refills: 0 | Status: SHIPPED | OUTPATIENT
Start: 2024-06-10

## 2024-06-10 RX ORDER — CARVEDILOL 3.12 MG/1
3.12 TABLET ORAL 2 TIMES DAILY WITH MEALS
COMMUNITY

## 2024-06-10 RX ORDER — LAMOTRIGINE 100 MG/1
50 TABLET ORAL DAILY
COMMUNITY

## 2024-06-10 RX ORDER — HYDROXYZINE HYDROCHLORIDE 10 MG/1
12.5 TABLET, FILM COATED ORAL 3 TIMES DAILY PRN
COMMUNITY

## 2024-06-10 RX ORDER — LEVOTHYROXINE SODIUM 0.03 MG/1
25 TABLET ORAL DAILY
COMMUNITY

## 2024-06-10 RX ADMIN — SODIUM CHLORIDE 1000 ML: 9 INJECTION, SOLUTION INTRAVENOUS at 18:27

## 2024-06-10 RX ADMIN — ONDANSETRON 4 MG: 2 INJECTION INTRAMUSCULAR; INTRAVENOUS at 18:28

## 2024-06-10 ASSESSMENT — PAIN - FUNCTIONAL ASSESSMENT: PAIN_FUNCTIONAL_ASSESSMENT: NONE - DENIES PAIN

## 2024-06-10 ASSESSMENT — PAIN SCALES - GENERAL: PAINLEVEL_OUTOF10: 4

## 2024-06-10 NOTE — ED NOTES
Pt arrived to ED2, c/o not feeling well and nauseated.  Pt states that she has been seen for the last 5 years at [facility] for alcoholic liver cirrhosis and other liver issues and that she is starting to feel like she did when everything started 5 years ago.  Pt states she was last seen at [facility] on 4/26 and then started socially drinking on 5/16.  Pt states this past weekend she had 2-3 too many and hasn't fell well since.  Pt also reports she feels like she is tremoring all over.  Pt's gait was steady as she ambulated to room, respirations easy and unlabored, skin is pink, warm, and dry.

## 2024-06-10 NOTE — DISCHARGE INSTRUCTIONS
Take Zofran as prescribed.  I have resent a prescription to [facility] in [place].  Call your primary care doctor for close follow-up.  Stop using alcohol.  Call your therapist for further evaluation as needed for support for alcohol abuse.

## 2024-06-10 NOTE — ED NOTES
Observed patient resp easy, warm and dry. Patient stable for discharge, instructions provided. Patient educated on medications, diet, drinking alcohol, signs and symptoms and follow up. Patient verbalized understanding, questions answered. Observed patient steadily ambulate from the department after discharge with significant. Appreciation of care verbalized.

## 2024-06-10 NOTE — ED PROVIDER NOTES
range of motion,  No stridor. No tenderness, Supple,  Eyes:  No discharge or  Swelling,Conjunctiva normal, PERRL, EOMI,  Respiratory: No respiratory distress, Normal breath sounds,  No wheezing, No chest tenderness.   Cardiovascular:  Normal heart rate, Normal rhythm, No murmurs,   GI:  No  reproducible pain, Bowel sounds normal, Soft, No masses, No pulsatile masses. No tenderness  Musculoskeletal:  Intact distal pulses, No edema, No tenderness, No cyanosis, No clubbing. Good range of motion in all major joints. No tenderness to palpation or major deformities noted.   Integument:  Warm, Dry, No erythema, No rash (on exposed areas)   Neurologic:  Alert & oriented x 3, Normal motor function, Normal sensory function, No focal deficits noted.   Psychiatric: Anxious    DIAGNOSTIC RESULTS     EKG:     RADIOLOGY:         Interpretation per the Radiologist below, if available at the time of this note:    No orders to display         LABS:  Labs Reviewed   CBC WITH AUTO DIFFERENTIAL - Abnormal; Notable for the following components:       Result Value    Seg Neutrophils 79.6 (*)     Monocytes 0.2 (*)     All other components within normal limits   COMPREHENSIVE METABOLIC PANEL - Abnormal; Notable for the following components:    BUN 6 (*)     AST 58 (*)     All other components within normal limits   MAGNESIUM - Abnormal; Notable for the following components:    Magnesium 1.4 (*)     All other components within normal limits   LIPASE   URINALYSIS WITH REFLEX TO CULTURE   ETHANOL   RAPID DRUG SCREEN, URINE   GLOMERULAR FILTRATION RATE, ESTIMATED   ANION GAP       All other labs were within normal range or not returned as of this dictation.    MIPS:  Not applicable      EMERGENCY DEPARTMENT COURSE and DIFFERENTIAL DIAGNOSIS/MDM:       Patient presents with shakiness jitteriness had recent alcohol use.  She is afebrile.  States she has not been using large amounts alcohol or frequently.  She was not sure if she was having some

## 2024-08-23 ENCOUNTER — HOSPITAL ENCOUNTER (OUTPATIENT)
Age: 44
Discharge: HOME | End: 2024-08-23
Payer: MEDICARE

## 2024-08-23 LAB
ALBUMIN SERPL BCG-MCNC: 4.3 G/DL (ref 3.5–5.1)
ALP SERPL-CCNC: 46 U/L (ref 38–126)
ALT SERPL W/O P-5'-P-CCNC: 35 U/L (ref 11–66)
AST SERPL-CCNC: 31 U/L (ref 5–40)
BILIRUB CONJ SERPL-MCNC: < 0.1 MG/DL (ref 0.1–13.8)
BILIRUB SERPL-MCNC: 0.4 MG/DL (ref 0.3–1.2)
PLATELET # BLD: 154 10*3/UL
PROT SERPL-MCNC: 6.5 G/DL (ref 6.1–8)

## 2024-08-23 PROCEDURE — 82977 ASSAY OF GGT: CPT

## 2024-08-23 PROCEDURE — 84450 TRANSFERASE (AST) (SGOT): CPT

## 2024-08-23 PROCEDURE — 80076 HEPATIC FUNCTION PANEL: CPT

## 2024-08-23 PROCEDURE — 84520 ASSAY OF UREA NITROGEN: CPT

## 2024-08-23 PROCEDURE — 83883 ASSAY NEPHELOMETRY NOT SPEC: CPT

## 2024-08-23 PROCEDURE — 36415 COLL VENOUS BLD VENIPUNCTURE: CPT

## 2024-08-23 PROCEDURE — 84460 ALANINE AMINO (ALT) (SGPT): CPT

## 2024-08-27 LAB
A2 MACROGLOB SERPL-MCNC: 345 MG/DL (ref 131–293)
ALT SERPL-CCNC: 45 U/L (ref 5–40)
ANNOTATION COMMENT IMP: ABNORMAL
AST SERPL-CCNC: 33 U/L (ref 9–40)
BUN SERPL-MCNC: 15 MG/DL (ref 7–20)
FIBROSIS STAGE SERPL QL: ABNORMAL
GGT SERPL-CCNC: 15 U/L (ref 7–33)
LIVER FIBR SCORE SERPL CALC.FIBROMETER: 0.71
PATHOLOGY STUDY: ABNORMAL
PLATELET # BLD: 154 K/UL
PT INDEX PPP: 71 % (ref 90–120)
REF LAB TEST RESULTS: 0.27
REF LAB TEST RESULTS: 0.71
REF LAB TEST RESULTS: ABNORMAL

## 2025-01-27 ENCOUNTER — HOSPITAL ENCOUNTER (EMERGENCY)
Age: 45
Discharge: HOME | End: 2025-01-27
Attending: EMERGENCY MEDICINE
Payer: MEDICARE

## 2025-01-27 VITALS
RESPIRATION RATE: 20 BRPM | HEART RATE: 78 BPM | OXYGEN SATURATION: 99 % | DIASTOLIC BLOOD PRESSURE: 73 MMHG | TEMPERATURE: 100.1 F | SYSTOLIC BLOOD PRESSURE: 165 MMHG

## 2025-01-27 DIAGNOSIS — J10.1 INFLUENZA A: ICD-10-CM

## 2025-01-27 DIAGNOSIS — R79.89 ELEVATED LIVER FUNCTION TESTS: ICD-10-CM

## 2025-01-27 DIAGNOSIS — B34.9 VIRAL SYNDROME: Primary | ICD-10-CM

## 2025-01-27 LAB
ALBUMIN SERPL BCP-MCNC: 3.8 GM/DL (ref 3.4–5)
ALP SERPL-CCNC: 108 U/L (ref 46–116)
ALT SERPL W P-5'-P-CCNC: 101 U/L (ref 14–63)
ANALYZED BY:: NORMAL
ANION GAP SERPL CALC-SCNC: 13 MEQ/L (ref 8–16)
AST SERPL W P-5'-P-CCNC: 289 U/L (ref 15–37)
BASOPHILS # BLD: 0.3 % (ref 0–3)
BASOPHILS ABSOLUTE: 0 THOU/MM3 (ref 0–0.1)
BILIRUB SERPL-MCNC: 0.3 MG/DL (ref 0.2–1)
BUN SERPL-MCNC: 3 MG/DL (ref 7–18)
CALCIUM SERPL-MCNC: 8.3 MG/DL (ref 8.5–10.1)
CHLORIDE SERPL-SCNC: 101 MEQ/L (ref 98–107)
CO2 SERPL-SCNC: 27 MEQ/L (ref 21–32)
CREAT SERPL-MCNC: 0.7 MG/DL (ref 0.6–1.3)
DATE OF COLLECTION: NORMAL
DRAWN BY: NORMAL
EOSINOPHILS ABSOLUTE: 0 THOU/MM3 (ref 0–0.5)
EOSINOPHILS RELATIVE PERCENT: 0 % (ref 0–4)
ETHANOL SERPL-MCNC: 0.12 % (GM/DL) (ref 0–0.08)
GFR SERPL CREATININE-BSD FRML MDRD: > 90 ML/MIN/1.73M2
GLUCOSE SERPL-MCNC: 90 MG/DL (ref 74–106)
HCT VFR BLD CALC: 40.9 % (ref 37–47)
HEMOGLOBIN: 14 GM/DL (ref 12–16)
IMMATURE GRANS (ABS): 0 THOU/MM3 (ref 0–0.07)
IMMATURE GRANULOCYTES %: 0 %
INFLUENZA A BY PCR: DETECTED
INFLUENZA B BY PCR: NOT DETECTED
LIPASE SERPL-CCNC: 89 U/L (ref 16–77)
LYMPHOCYTES # BLD AUTO: 25.3 % (ref 15–47)
LYMPHOCYTES ABSOLUTE: 0.9 THOU/MM3 (ref 1–4.8)
Lab: 1551
Lab: NORMAL
MCH RBC QN AUTO: 31 PG (ref 26–32)
MCHC RBC AUTO-ENTMCNC: 34.2 GM/DL (ref 31–35)
MCV RBC AUTO: 90.7 FL (ref 81–99)
MONOCYTES: 0.3 THOU/MM3 (ref 0.3–1.3)
MONOCYTES: 8.2 % (ref 0–12)
NEUTROPHILS ABSOLUTE: 2.4 THOU/MM3 (ref 1.8–7.7)
PDW BLD-RTO: 12.8 % (ref 11.5–14.9)
PLATELET # BLD AUTO: 96 THOU/MM3 (ref 130–400)
PLATELET BLD QL SMEAR: ABNORMAL
PMV BLD AUTO: 10.5 FL (ref 9.4–12.4)
POTASSIUM SERPL-SCNC: 3.7 MEQ/L (ref 3.5–5.1)
PROT SERPL-MCNC: 7.1 GM/DL (ref 6.4–8.2)
RBC # BLD: 4.51 MILL/MM3 (ref 4.1–5.3)
SARS-COV-2 RNA, RT PCR: NOT DETECTED
SCAN OF BLOOD SMEAR: NORMAL
SEG NEUTROPHILS: 66.2 % (ref 43–75)
SODIUM SERPL-SCNC: 141 MEQ/L (ref 136–145)
WBC # BLD: 3.7 THOU/MM3 (ref 4.8–10.8)

## 2025-01-27 PROCEDURE — 80053 COMPREHEN METABOLIC PANEL: CPT

## 2025-01-27 PROCEDURE — 96374 THER/PROPH/DIAG INJ IV PUSH: CPT

## 2025-01-27 PROCEDURE — 87636 SARSCOV2 & INF A&B AMP PRB: CPT

## 2025-01-27 PROCEDURE — 2580000003 HC RX 258: Performed by: EMERGENCY MEDICINE

## 2025-01-27 PROCEDURE — 99284 EMERGENCY DEPT VISIT MOD MDM: CPT

## 2025-01-27 PROCEDURE — 85025 COMPLETE CBC W/AUTO DIFF WBC: CPT

## 2025-01-27 PROCEDURE — 6360000002 HC RX W HCPCS: Performed by: EMERGENCY MEDICINE

## 2025-01-27 PROCEDURE — 83690 ASSAY OF LIPASE: CPT

## 2025-01-27 PROCEDURE — 82077 ASSAY SPEC XCP UR&BREATH IA: CPT

## 2025-01-27 RX ORDER — ONDANSETRON 4 MG/1
4 TABLET, FILM COATED ORAL EVERY 8 HOURS PRN
Qty: 12 TABLET | Refills: 0 | Status: SHIPPED | OUTPATIENT
Start: 2025-01-27

## 2025-01-27 RX ORDER — 0.9 % SODIUM CHLORIDE 0.9 %
1000 INTRAVENOUS SOLUTION INTRAVENOUS ONCE
Status: COMPLETED | OUTPATIENT
Start: 2025-01-27 | End: 2025-01-27

## 2025-01-27 RX ORDER — ONDANSETRON 2 MG/ML
4 INJECTION INTRAMUSCULAR; INTRAVENOUS ONCE
Status: COMPLETED | OUTPATIENT
Start: 2025-01-27 | End: 2025-01-27

## 2025-01-27 RX ADMIN — SODIUM CHLORIDE 1000 ML: 9 INJECTION, SOLUTION INTRAVENOUS at 15:54

## 2025-01-27 RX ADMIN — ONDANSETRON 4 MG: 2 INJECTION INTRAMUSCULAR; INTRAVENOUS at 15:55

## 2025-01-27 ASSESSMENT — PAIN DESCRIPTION - LOCATION: LOCATION: GENERALIZED

## 2025-01-27 ASSESSMENT — PAIN SCALES - GENERAL: PAINLEVEL_OUTOF10: 3

## 2025-01-27 ASSESSMENT — PAIN - FUNCTIONAL ASSESSMENT: PAIN_FUNCTIONAL_ASSESSMENT: 0-10

## 2025-01-27 NOTE — ED PROVIDER NOTES
2. Influenza A    3. Elevated liver function tests          DISPOSITION/PLAN     DISPOSITION Discharge - Pending Orders Complete 01/27/2025 04:24:55 PM   DISPOSITION CONDITION Stable           PATIENT REFERRED TO:  Chichi Salmeron, ALANIS - CNP  [address] [facility] [address]  [phone]    Call   Follow up from ER condition      DISCHARGE MEDICATIONS:  Current Discharge Medication List          (Please note that portions of this note were completed with a voice recognition program.  Efforts were made to edit the dictations but occasionally words are mis-transcribed.)    Car Reed MD (electronically signed)  Attending Emergency Physician                       Car Reed MD  01/27/25 5528

## 2025-01-27 NOTE — ED TRIAGE NOTES
Pt states shaking, chills, nausea, and cough for 1 week.   Detail Level: Detailed Quality 130: Documentation Of Current Medications In The Medical Record: Current Medications Documented Quality 111:Pneumonia Vaccination Status For Older Adults: Pneumococcal Vaccination Previously Received Quality 226: Preventive Care And Screening: Tobacco Use: Screening And Cessation Intervention: Patient screened for tobacco use and is an ex/non-smoker

## 2025-06-10 ENCOUNTER — APPOINTMENT (OUTPATIENT)
Dept: ULTRASOUND IMAGING | Age: 45
DRG: 439 | End: 2025-06-10
Payer: MEDICARE

## 2025-06-10 ENCOUNTER — APPOINTMENT (OUTPATIENT)
Dept: GENERAL RADIOLOGY | Age: 45
DRG: 439 | End: 2025-06-10
Payer: MEDICARE

## 2025-06-10 ENCOUNTER — HOSPITAL ENCOUNTER (INPATIENT)
Age: 45
LOS: 6 days | Discharge: ANOTHER HOSPITAL | DRG: 439 | End: 2025-06-16
Attending: STUDENT IN AN ORGANIZED HEALTH CARE EDUCATION/TRAINING PROGRAM | Admitting: STUDENT IN AN ORGANIZED HEALTH CARE EDUCATION/TRAINING PROGRAM
Payer: MEDICARE

## 2025-06-10 ENCOUNTER — APPOINTMENT (OUTPATIENT)
Dept: CT IMAGING | Age: 45
DRG: 439 | End: 2025-06-10
Payer: MEDICARE

## 2025-06-10 ENCOUNTER — APPOINTMENT (OUTPATIENT)
Dept: MRI IMAGING | Age: 45
DRG: 439 | End: 2025-06-10
Payer: MEDICARE

## 2025-06-10 DIAGNOSIS — E86.0 DEHYDRATION: ICD-10-CM

## 2025-06-10 DIAGNOSIS — R11.2 NAUSEA AND VOMITING, UNSPECIFIED VOMITING TYPE: ICD-10-CM

## 2025-06-10 DIAGNOSIS — K85.20 ALCOHOL-INDUCED ACUTE PANCREATITIS, UNSPECIFIED COMPLICATION STATUS: Primary | ICD-10-CM

## 2025-06-10 DIAGNOSIS — F41.1 ANXIETY STATE: ICD-10-CM

## 2025-06-10 DIAGNOSIS — M62.40 INVOLUNTARY MUSCLE CONTRACTIONS: ICD-10-CM

## 2025-06-10 DIAGNOSIS — R26.2 DIFFICULTY WALKING: ICD-10-CM

## 2025-06-10 PROBLEM — K70.10 ACUTE ALCOHOLIC HEPATITIS (HCC): Status: ACTIVE | Noted: 2025-06-10

## 2025-06-10 PROBLEM — F10.230 ALCOHOL DEPENDENCE WITH UNCOMPLICATED WITHDRAWAL (HCC): Status: ACTIVE | Noted: 2025-06-10

## 2025-06-10 LAB
ALBUMIN SERPL BCG-MCNC: 3.8 G/DL (ref 3.4–4.9)
ALP SERPL-CCNC: 181 U/L (ref 38–126)
ALT SERPL W/O P-5'-P-CCNC: 150 U/L (ref 10–35)
AMMONIA PLAS-MCNC: 40 UMOL/L (ref 11–51)
ANION GAP SERPL CALC-SCNC: 19 MEQ/L (ref 8–16)
AST SERPL-CCNC: 473 U/L (ref 10–35)
BASOPHILS ABSOLUTE: 0 THOU/MM3 (ref 0–0.1)
BASOPHILS NFR BLD AUTO: 0.6 %
BILIRUB CONJ SERPL-MCNC: 1.1 MG/DL (ref 0–0.2)
BILIRUB SERPL-MCNC: 1.9 MG/DL (ref 0.3–1.2)
BUN SERPL-MCNC: < 2 MG/DL (ref 8–23)
CALCIUM SERPL-MCNC: 8.6 MG/DL (ref 8.6–10)
CHLORIDE SERPL-SCNC: 94 MEQ/L (ref 98–111)
CO2 SERPL-SCNC: 21 MEQ/L (ref 22–29)
CREAT SERPL-MCNC: 0.7 MG/DL (ref 0.5–0.9)
CRP SERPL-MCNC: < 0.3 MG/DL (ref 0–0.5)
DEPRECATED RDW RBC AUTO: 40.2 FL (ref 35–45)
EKG ATRIAL RATE: 147 BPM
EKG P AXIS: 67 DEGREES
EKG Q-T INTERVAL: 396 MS
EKG QRS DURATION: 64 MS
EKG QTC CALCULATION (BAZETT): 489 MS
EKG R AXIS: 60 DEGREES
EKG T AXIS: 72 DEGREES
EKG VENTRICULAR RATE: 92 BPM
EOSINOPHIL NFR BLD AUTO: 0 %
EOSINOPHILS ABSOLUTE: 0 THOU/MM3 (ref 0–0.4)
ERYTHROCYTE [DISTWIDTH] IN BLOOD BY AUTOMATED COUNT: 11.9 % (ref 11.5–14.5)
ERYTHROCYTE [SEDIMENTATION RATE] IN BLOOD BY WESTERGREN METHOD: 12 MM/HR (ref 0–20)
ETHANOL SERPL-MCNC: 0.08 % (ref 0–0.08)
ETHANOL SERPL-MCNC: < 0.01 % (ref 0–0.08)
GFR SERPL CREATININE-BSD FRML MDRD: > 90 ML/MIN/1.73M2
GLUCOSE SERPL-MCNC: 126 MG/DL (ref 74–109)
HCT VFR BLD AUTO: 47.3 % (ref 37–47)
HGB BLD-MCNC: 16.9 GM/DL (ref 12–16)
IMM GRANULOCYTES # BLD AUTO: 0.01 THOU/MM3 (ref 0–0.07)
IMM GRANULOCYTES NFR BLD AUTO: 0.3 %
LIPASE SERPL-CCNC: 150 U/L (ref 13–60)
LYMPHOCYTES ABSOLUTE: 0.8 THOU/MM3 (ref 1–4.8)
LYMPHOCYTES NFR BLD AUTO: 24.6 %
MAGNESIUM SERPL-MCNC: 1.6 MG/DL (ref 1.6–2.6)
MCH RBC QN AUTO: 32.7 PG (ref 26–33)
MCHC RBC AUTO-ENTMCNC: 35.7 GM/DL (ref 32.2–35.5)
MCV RBC AUTO: 91.5 FL (ref 81–99)
MONOCYTES ABSOLUTE: 0.2 THOU/MM3 (ref 0.4–1.3)
MONOCYTES NFR BLD AUTO: 5.6 %
NEUTROPHILS ABSOLUTE: 2.2 THOU/MM3 (ref 1.8–7.7)
NEUTROPHILS NFR BLD AUTO: 68.9 %
NRBC BLD AUTO-RTO: 0 /100 WBC
OSMOLALITY SERPL CALC.SUM OF ELEC: 266 MOSMOL/KG (ref 275–300)
PHOSPHATE SERPL-MCNC: 2.2 MG/DL (ref 2.5–4.5)
PLATELET # BLD AUTO: 81 THOU/MM3 (ref 130–400)
PLATELET BLD QL SMEAR: ABNORMAL
PMV BLD AUTO: 11.2 FL (ref 9.4–12.4)
POTASSIUM SERPL-SCNC: 3.5 MEQ/L (ref 3.5–5.2)
PROT SERPL-MCNC: 7.1 G/DL (ref 6.4–8.3)
RBC # BLD AUTO: 5.17 MILL/MM3 (ref 4.2–5.4)
SCAN OF BLOOD SMEAR: NORMAL
SODIUM SERPL-SCNC: 134 MEQ/L (ref 135–145)
TROPONIN, HIGH SENSITIVITY: 9 NG/L (ref 0–12)
WBC # BLD AUTO: 3.2 THOU/MM3 (ref 4.8–10.8)

## 2025-06-10 PROCEDURE — 86140 C-REACTIVE PROTEIN: CPT

## 2025-06-10 PROCEDURE — 6360000002 HC RX W HCPCS

## 2025-06-10 PROCEDURE — 71046 X-RAY EXAM CHEST 2 VIEWS: CPT

## 2025-06-10 PROCEDURE — 2580000003 HC RX 258: Performed by: NURSE PRACTITIONER

## 2025-06-10 PROCEDURE — 93010 ELECTROCARDIOGRAM REPORT: CPT | Performed by: INTERNAL MEDICINE

## 2025-06-10 PROCEDURE — 82248 BILIRUBIN DIRECT: CPT

## 2025-06-10 PROCEDURE — 96361 HYDRATE IV INFUSION ADD-ON: CPT

## 2025-06-10 PROCEDURE — 72146 MRI CHEST SPINE W/O DYE: CPT

## 2025-06-10 PROCEDURE — 6360000004 HC RX CONTRAST MEDICATION: Performed by: NURSE PRACTITIONER

## 2025-06-10 PROCEDURE — 80053 COMPREHEN METABOLIC PANEL: CPT

## 2025-06-10 PROCEDURE — 84484 ASSAY OF TROPONIN QUANT: CPT

## 2025-06-10 PROCEDURE — 74177 CT ABD & PELVIS W/CONTRAST: CPT

## 2025-06-10 PROCEDURE — 83690 ASSAY OF LIPASE: CPT

## 2025-06-10 PROCEDURE — 72141 MRI NECK SPINE W/O DYE: CPT

## 2025-06-10 PROCEDURE — 6370000000 HC RX 637 (ALT 250 FOR IP)

## 2025-06-10 PROCEDURE — 6370000000 HC RX 637 (ALT 250 FOR IP): Performed by: NURSE PRACTITIONER

## 2025-06-10 PROCEDURE — 70553 MRI BRAIN STEM W/O & W/DYE: CPT

## 2025-06-10 PROCEDURE — 84100 ASSAY OF PHOSPHORUS: CPT

## 2025-06-10 PROCEDURE — 93005 ELECTROCARDIOGRAM TRACING: CPT | Performed by: STUDENT IN AN ORGANIZED HEALTH CARE EDUCATION/TRAINING PROGRAM

## 2025-06-10 PROCEDURE — 76705 ECHO EXAM OF ABDOMEN: CPT

## 2025-06-10 PROCEDURE — 83735 ASSAY OF MAGNESIUM: CPT

## 2025-06-10 PROCEDURE — 2580000003 HC RX 258

## 2025-06-10 PROCEDURE — 82140 ASSAY OF AMMONIA: CPT

## 2025-06-10 PROCEDURE — 96374 THER/PROPH/DIAG INJ IV PUSH: CPT

## 2025-06-10 PROCEDURE — 85025 COMPLETE CBC W/AUTO DIFF WBC: CPT

## 2025-06-10 PROCEDURE — 6360000002 HC RX W HCPCS: Performed by: NURSE PRACTITIONER

## 2025-06-10 PROCEDURE — 85651 RBC SED RATE NONAUTOMATED: CPT

## 2025-06-10 PROCEDURE — A9579 GAD-BASE MR CONTRAST NOS,1ML: HCPCS | Performed by: NURSE PRACTITIONER

## 2025-06-10 PROCEDURE — 72148 MRI LUMBAR SPINE W/O DYE: CPT

## 2025-06-10 PROCEDURE — 99285 EMERGENCY DEPT VISIT HI MDM: CPT

## 2025-06-10 PROCEDURE — 36415 COLL VENOUS BLD VENIPUNCTURE: CPT

## 2025-06-10 PROCEDURE — 99223 1ST HOSP IP/OBS HIGH 75: CPT | Performed by: STUDENT IN AN ORGANIZED HEALTH CARE EDUCATION/TRAINING PROGRAM

## 2025-06-10 PROCEDURE — 1200000003 HC TELEMETRY R&B

## 2025-06-10 PROCEDURE — 82077 ASSAY SPEC XCP UR&BREATH IA: CPT

## 2025-06-10 RX ORDER — BUSPIRONE HYDROCHLORIDE 7.5 MG/1
15 TABLET ORAL 3 TIMES DAILY
Status: DISCONTINUED | OUTPATIENT
Start: 2025-06-10 | End: 2025-06-16 | Stop reason: HOSPADM

## 2025-06-10 RX ORDER — 0.9 % SODIUM CHLORIDE 0.9 %
1000 INTRAVENOUS SOLUTION INTRAVENOUS ONCE
Status: COMPLETED | OUTPATIENT
Start: 2025-06-10 | End: 2025-06-10

## 2025-06-10 RX ORDER — LORAZEPAM 1 MG/1
1 TABLET ORAL ONCE
Status: COMPLETED | OUTPATIENT
Start: 2025-06-10 | End: 2025-06-10

## 2025-06-10 RX ORDER — SODIUM CHLORIDE 0.9 % (FLUSH) 0.9 %
5-40 SYRINGE (ML) INJECTION PRN
Status: DISCONTINUED | OUTPATIENT
Start: 2025-06-10 | End: 2025-06-16 | Stop reason: HOSPADM

## 2025-06-10 RX ORDER — POLYETHYLENE GLYCOL 3350 17 G/17G
17 POWDER, FOR SOLUTION ORAL DAILY PRN
Status: DISCONTINUED | OUTPATIENT
Start: 2025-06-10 | End: 2025-06-16 | Stop reason: HOSPADM

## 2025-06-10 RX ORDER — VENLAFAXINE 25 MG/1
25 TABLET ORAL DAILY
COMMUNITY
Start: 2025-03-15

## 2025-06-10 RX ORDER — SODIUM CHLORIDE 0.9 % (FLUSH) 0.9 %
5-40 SYRINGE (ML) INJECTION EVERY 12 HOURS SCHEDULED
Status: DISCONTINUED | OUTPATIENT
Start: 2025-06-10 | End: 2025-06-16 | Stop reason: HOSPADM

## 2025-06-10 RX ORDER — IOPAMIDOL 755 MG/ML
80 INJECTION, SOLUTION INTRAVASCULAR
Status: COMPLETED | OUTPATIENT
Start: 2025-06-10 | End: 2025-06-10

## 2025-06-10 RX ORDER — TRAZODONE HYDROCHLORIDE 50 MG/1
50 TABLET ORAL 2 TIMES DAILY
Status: DISCONTINUED | OUTPATIENT
Start: 2025-06-10 | End: 2025-06-13

## 2025-06-10 RX ORDER — DICYCLOMINE HCL 20 MG
20 TABLET ORAL 4 TIMES DAILY
Status: DISCONTINUED | OUTPATIENT
Start: 2025-06-10 | End: 2025-06-16 | Stop reason: HOSPADM

## 2025-06-10 RX ORDER — GADOTERIDOL 279.3 MG/ML
15 INJECTION INTRAVENOUS
Status: COMPLETED | OUTPATIENT
Start: 2025-06-10 | End: 2025-06-10

## 2025-06-10 RX ORDER — PNV NO.95/FERROUS FUM/FOLIC AC 28MG-0.8MG
500 TABLET ORAL 2 TIMES DAILY
Status: DISCONTINUED | OUTPATIENT
Start: 2025-06-10 | End: 2025-06-16 | Stop reason: HOSPADM

## 2025-06-10 RX ORDER — LORAZEPAM 2 MG/1
4 TABLET ORAL
Status: DISCONTINUED | OUTPATIENT
Start: 2025-06-10 | End: 2025-06-11

## 2025-06-10 RX ORDER — ENOXAPARIN SODIUM 100 MG/ML
40 INJECTION SUBCUTANEOUS DAILY
Status: DISCONTINUED | OUTPATIENT
Start: 2025-06-11 | End: 2025-06-16 | Stop reason: HOSPADM

## 2025-06-10 RX ORDER — FOLIC ACID 1 MG/1
1 TABLET ORAL DAILY
Status: DISCONTINUED | OUTPATIENT
Start: 2025-06-10 | End: 2025-06-10

## 2025-06-10 RX ORDER — BUMETANIDE 1 MG/1
1 TABLET ORAL 2 TIMES DAILY
Status: DISCONTINUED | OUTPATIENT
Start: 2025-06-10 | End: 2025-06-10

## 2025-06-10 RX ORDER — POTASSIUM CHLORIDE 1500 MG/1
40 TABLET, EXTENDED RELEASE ORAL PRN
Status: DISCONTINUED | OUTPATIENT
Start: 2025-06-10 | End: 2025-06-16 | Stop reason: HOSPADM

## 2025-06-10 RX ORDER — LORAZEPAM 2 MG/ML
4 INJECTION INTRAMUSCULAR
Status: DISCONTINUED | OUTPATIENT
Start: 2025-06-10 | End: 2025-06-10 | Stop reason: RX

## 2025-06-10 RX ORDER — ONDANSETRON 2 MG/ML
4 INJECTION INTRAMUSCULAR; INTRAVENOUS EVERY 6 HOURS PRN
Status: DISCONTINUED | OUTPATIENT
Start: 2025-06-10 | End: 2025-06-16 | Stop reason: HOSPADM

## 2025-06-10 RX ORDER — LORAZEPAM 2 MG/ML
2 INJECTION INTRAMUSCULAR
Status: DISCONTINUED | OUTPATIENT
Start: 2025-06-10 | End: 2025-06-10 | Stop reason: RX

## 2025-06-10 RX ORDER — MAGNESIUM SULFATE IN WATER 40 MG/ML
2000 INJECTION, SOLUTION INTRAVENOUS PRN
Status: DISCONTINUED | OUTPATIENT
Start: 2025-06-10 | End: 2025-06-16 | Stop reason: HOSPADM

## 2025-06-10 RX ORDER — SODIUM CHLORIDE 9 MG/ML
INJECTION, SOLUTION INTRAVENOUS PRN
Status: DISCONTINUED | OUTPATIENT
Start: 2025-06-10 | End: 2025-06-16 | Stop reason: HOSPADM

## 2025-06-10 RX ORDER — LEVOTHYROXINE SODIUM 25 UG/1
25 TABLET ORAL DAILY
Status: DISCONTINUED | OUTPATIENT
Start: 2025-06-11 | End: 2025-06-10

## 2025-06-10 RX ORDER — LORAZEPAM 1 MG/1
1 TABLET ORAL
Status: DISCONTINUED | OUTPATIENT
Start: 2025-06-10 | End: 2025-06-11

## 2025-06-10 RX ORDER — VENLAFAXINE 50 MG/1
25 TABLET ORAL DAILY
Status: DISCONTINUED | OUTPATIENT
Start: 2025-06-11 | End: 2025-06-16 | Stop reason: HOSPADM

## 2025-06-10 RX ORDER — ONDANSETRON 4 MG/1
4 TABLET, ORALLY DISINTEGRATING ORAL EVERY 8 HOURS PRN
Status: DISCONTINUED | OUTPATIENT
Start: 2025-06-10 | End: 2025-06-16 | Stop reason: HOSPADM

## 2025-06-10 RX ORDER — SPIRONOLACTONE 25 MG/1
50 TABLET ORAL DAILY
Status: DISCONTINUED | OUTPATIENT
Start: 2025-06-11 | End: 2025-06-16 | Stop reason: HOSPADM

## 2025-06-10 RX ORDER — LORAZEPAM 2 MG/1
2 TABLET ORAL
Status: DISCONTINUED | OUTPATIENT
Start: 2025-06-10 | End: 2025-06-11

## 2025-06-10 RX ORDER — SODIUM CHLORIDE 9 MG/ML
INJECTION, SOLUTION INTRAVENOUS CONTINUOUS
Status: ACTIVE | OUTPATIENT
Start: 2025-06-10 | End: 2025-06-11

## 2025-06-10 RX ORDER — HYDROXYZINE HYDROCHLORIDE 25 MG/1
25 TABLET, FILM COATED ORAL 3 TIMES DAILY PRN
Status: DISCONTINUED | OUTPATIENT
Start: 2025-06-10 | End: 2025-06-16 | Stop reason: HOSPADM

## 2025-06-10 RX ORDER — PROCHLORPERAZINE EDISYLATE 5 MG/ML
10 INJECTION INTRAMUSCULAR; INTRAVENOUS
Status: DISCONTINUED | OUTPATIENT
Start: 2025-06-10 | End: 2025-06-16 | Stop reason: HOSPADM

## 2025-06-10 RX ORDER — LACTULOSE 10 G/15ML
10 SOLUTION ORAL 3 TIMES DAILY
Status: DISCONTINUED | OUTPATIENT
Start: 2025-06-10 | End: 2025-06-16 | Stop reason: HOSPADM

## 2025-06-10 RX ORDER — PANTOPRAZOLE SODIUM 40 MG/1
40 TABLET, DELAYED RELEASE ORAL 2 TIMES DAILY WITH MEALS
Status: DISCONTINUED | OUTPATIENT
Start: 2025-06-10 | End: 2025-06-16 | Stop reason: HOSPADM

## 2025-06-10 RX ORDER — VENLAFAXINE 50 MG/1
25 TABLET ORAL DAILY
Status: DISCONTINUED | OUTPATIENT
Start: 2025-06-10 | End: 2025-06-10

## 2025-06-10 RX ORDER — LORAZEPAM 2 MG/ML
0.5 INJECTION INTRAMUSCULAR ONCE
Status: DISCONTINUED | OUTPATIENT
Start: 2025-06-10 | End: 2025-06-10

## 2025-06-10 RX ORDER — POTASSIUM CHLORIDE 7.45 MG/ML
10 INJECTION INTRAVENOUS PRN
Status: DISCONTINUED | OUTPATIENT
Start: 2025-06-10 | End: 2025-06-16 | Stop reason: HOSPADM

## 2025-06-10 RX ORDER — MULTIVITAMIN WITH IRON
1 TABLET ORAL DAILY
Status: DISCONTINUED | OUTPATIENT
Start: 2025-06-10 | End: 2025-06-16 | Stop reason: HOSPADM

## 2025-06-10 RX ORDER — VENLAFAXINE 50 MG/1
50 TABLET ORAL 2 TIMES DAILY
Status: DISCONTINUED | OUTPATIENT
Start: 2025-06-10 | End: 2025-06-16 | Stop reason: HOSPADM

## 2025-06-10 RX ORDER — LAMOTRIGINE 25 MG/1
50 TABLET ORAL 2 TIMES DAILY
Status: DISCONTINUED | OUTPATIENT
Start: 2025-06-10 | End: 2025-06-16 | Stop reason: HOSPADM

## 2025-06-10 RX ORDER — LORAZEPAM 2 MG/ML
3 INJECTION INTRAMUSCULAR
Status: DISCONTINUED | OUTPATIENT
Start: 2025-06-10 | End: 2025-06-10 | Stop reason: RX

## 2025-06-10 RX ORDER — LORAZEPAM 2 MG/ML
1 INJECTION INTRAMUSCULAR
Status: DISCONTINUED | OUTPATIENT
Start: 2025-06-10 | End: 2025-06-10 | Stop reason: RX

## 2025-06-10 RX ORDER — ONDANSETRON 2 MG/ML
4 INJECTION INTRAMUSCULAR; INTRAVENOUS ONCE
Status: COMPLETED | OUTPATIENT
Start: 2025-06-10 | End: 2025-06-10

## 2025-06-10 RX ORDER — CARVEDILOL 3.12 MG/1
3.12 TABLET ORAL 2 TIMES DAILY WITH MEALS
Status: DISCONTINUED | OUTPATIENT
Start: 2025-06-10 | End: 2025-06-16 | Stop reason: HOSPADM

## 2025-06-10 RX ORDER — VENLAFAXINE 50 MG/1
50 TABLET ORAL 2 TIMES DAILY
COMMUNITY
Start: 2025-04-17

## 2025-06-10 RX ORDER — MIDODRINE HYDROCHLORIDE 10 MG/1
10 TABLET ORAL
Status: DISCONTINUED | OUTPATIENT
Start: 2025-06-10 | End: 2025-06-10

## 2025-06-10 RX ORDER — LANOLIN ALCOHOL/MO/W.PET/CERES
100 CREAM (GRAM) TOPICAL DAILY
Status: DISCONTINUED | OUTPATIENT
Start: 2025-06-10 | End: 2025-06-16 | Stop reason: HOSPADM

## 2025-06-10 RX ADMIN — ONDANSETRON 4 MG: 2 INJECTION, SOLUTION INTRAMUSCULAR; INTRAVENOUS at 16:12

## 2025-06-10 RX ADMIN — Medication 10.5 MG: at 21:17

## 2025-06-10 RX ADMIN — GADOTERIDOL 13 ML: 279.3 INJECTION, SOLUTION INTRAVENOUS at 16:02

## 2025-06-10 RX ADMIN — BUSPIRONE HYDROCHLORIDE 15 MG: 7.5 TABLET ORAL at 21:30

## 2025-06-10 RX ADMIN — SODIUM CHLORIDE: 0.9 INJECTION, SOLUTION INTRAVENOUS at 21:06

## 2025-06-10 RX ADMIN — LORAZEPAM 1 MG: 1 TABLET ORAL at 21:23

## 2025-06-10 RX ADMIN — Medication 1 TABLET: at 21:29

## 2025-06-10 RX ADMIN — TRAZODONE HYDROCHLORIDE 50 MG: 50 TABLET ORAL at 21:17

## 2025-06-10 RX ADMIN — VENLAFAXINE HYDROCHLORIDE 50 MG: 50 TABLET ORAL at 21:29

## 2025-06-10 RX ADMIN — ONDANSETRON 4 MG: 2 INJECTION, SOLUTION INTRAMUSCULAR; INTRAVENOUS at 21:22

## 2025-06-10 RX ADMIN — LORAZEPAM 1 MG: 1 TABLET ORAL at 12:11

## 2025-06-10 RX ADMIN — IOPAMIDOL 80 ML: 755 INJECTION, SOLUTION INTRAVENOUS at 14:28

## 2025-06-10 RX ADMIN — PANTOPRAZOLE SODIUM 40 MG: 40 TABLET, DELAYED RELEASE ORAL at 23:36

## 2025-06-10 RX ADMIN — SODIUM CHLORIDE 1000 ML: 0.9 INJECTION, SOLUTION INTRAVENOUS at 16:12

## 2025-06-10 RX ADMIN — LACTULOSE 10 G: 20 SOLUTION ORAL at 21:19

## 2025-06-10 RX ADMIN — RIFAXIMIN 550 MG: 550 TABLET ORAL at 21:30

## 2025-06-10 RX ADMIN — Medication 500 MG: at 21:30

## 2025-06-10 RX ADMIN — DICYCLOMINE HYDROCHLORIDE 20 MG: 20 TABLET ORAL at 21:30

## 2025-06-10 RX ADMIN — LAMOTRIGINE 50 MG: 25 TABLET ORAL at 21:29

## 2025-06-10 RX ADMIN — LORAZEPAM 1 MG: 1 TABLET ORAL at 23:36

## 2025-06-10 RX ADMIN — PROCHLORPERAZINE EDISYLATE 10 MG: 5 INJECTION INTRAMUSCULAR; INTRAVENOUS at 23:36

## 2025-06-10 NOTE — H&P
History & Physical  Internal Medicine Resident         Patient: Elif Martínez 45 y.o. female      : 1980  Date of Admission: 6/10/2025  Date of Service: Pt seen/examined on 06/10/25 and Admitted to Inpatient with expected LOS greater than two midnights due to medical therapy.       ASSESSMENT AND PLAN  Acute pancreatitis: Presenting the hospital with acute abdominal pain nausea vomiting.  Lipase on presentation 150, presenting with abdominal pain and nausea/vomiting.  CT abdomen pelvis showing peripancreatic edema with fullness of the head of the pancreas suspicious for acute pancreatitis, 3 mm calcification of the head of the pancreas.  she reports he had a history of pancreatitis in .  Will start fluids normal saline 125 cc overnight, clear liquid diet, scheduled Compazine with meals.  Will start Dilaudid pain panel for abdominal pain.    New AV node block (Mobitz type I): Likely medication induced, will hold Coreg.  Repeat EKG in the a.m., cardiac telemetry overnight    History of alcohol abuse: Reports intermittent alcohol use over the past few weeks, last reported drink yesterday 2025 patient states that she had 4-5 white claws at that time.    New tremor, likely essential tremor, concern for alcohol withdrawal: Patient reports new tremor bilateral upper and lower extremities over the past 1 to 2 weeks, worsening over that time.  Patient states that when she drinks alcohol the tremor disappears.  MRI brain negative for any acute findings.  Will consult neurology.  PT OT ordered.  Will start patient on CIWA protocol with Ativan    Alcoholic cirrhosis with history of paraesophageal varices, with acute hepatitis: Follows with GI at [facility] as well with Dr. Sahu at [facility], CT showing  paraesophageal and gastric varices unchanged from prior exam.  Hold Coreg 3.125 mg twice daily in setting of new AV je block pulm medications include as needed Lactulose, Bumex 1 mg twice daily, Aldactone  PARACENTESIS      PA EGD TRANSORAL BIOPSY SINGLE/MULTIPLE N/A 3/20/2018    EGD  POSS BANDING performed by Dustin Larson MD at [facility]    AZALIA-EN-Y GASTRIC BYPASS      UPPER GASTROINTESTINAL ENDOSCOPY Left 2/18/2018    EGD BAND LIGATION performed by Dustin Larson MD at [facility]    UPPER GASTROINTESTINAL ENDOSCOPY Left 5/24/2018    EGD BAND LIGATION performed by Kyler Chauhan MD at [facility]    UPPER GASTROINTESTINAL ENDOSCOPY N/A 6/26/2018    EGD BAND LIGATION performed by Dustin Larson MD at [facility]    UPPER GASTROINTESTINAL ENDOSCOPY N/A 10/23/2018    EGD BAND LIGATION performed by Dustin Larson MD at [facility]    UPPER GASTROINTESTINAL ENDOSCOPY  10/23/2018    EGD ESOPHAGOGASTRODUODENOSCOPY performed by Dustin Larson MD at [facility]    UPPER GASTROINTESTINAL ENDOSCOPY N/A 1/29/2019    EGD WITH BANDING performed by Dustin Larson MD at [facility]    UPPER GASTROINTESTINAL ENDOSCOPY Left 4/23/2019    EGD BIOPSY performed by uDstin Larson MD at [facility]    UPPER GASTROINTESTINAL ENDOSCOPY N/A 5/23/2019    EGD DIAGNOSTIC ONLY performed by Jayden Goldsmith MD at [facility]         Problem Relation Age of Onset    Diabetes Mother      Social History     Socioeconomic History    Marital status:      Spouse name: None    Number of children: None    Years of education: None    Highest education level: None   Tobacco Use    Smoking status: Passive Smoke Exposure - Never Smoker    Smokeless tobacco: Never   Vaping Use    Vaping status: Never Used   Substance and Sexual Activity    Alcohol use: Yes     Comment: socially    Drug use: No    Sexual activity: Yes     Partners: Male        Code status: Full Code     Electronically signed by Dajuan Saldana MD on 6/10/2025 at 7:39 PM.   Case was discussed with the Attending, Ketty Sanabria MD

## 2025-06-10 NOTE — ED NOTES
Pt to bathroom via WC. Pt back to bed with assistance. Denies other needs. Resp regular. Call light in reach.

## 2025-06-10 NOTE — PROGRESS NOTES
Pharmacy Medication History Note    List of current medications patient is taking is complete per available information.    Source of information: Dispense report, patient     Changes made to medication list:  Medications marked as not taking:  Tylenol suspension  Bumetanide 1 mg tablet  Cyanocobalamin 1000 mcg/ml inj  Folic acid 1 mg tablet  Levothyroxine 25 mcg tablet  Lidocaine viscous solution  Lorazepam 0.5 mg tablet  Midodrine 10 mg tablet  Potassium 20 mEQ/15 mL solution  Sertraline 25 mg tablet    Medications added:  Venlafaxine 25 mg tablet at noon, 50 mg tablet in the morning and evening    Medications doses adjusted:  Hydroxyzine 12.5 mg tablets updated to 25 mg TID PRN  Lamotrigine 50 mg tablet updated to 50 mg BID    Other notes:  Patient has uncertainty regarding medications. Spouse may be able to bring in a list from home tomorrow.   Denies use of other OTC or herbal medications.    Allergies reviewed    Electronically signed by Mary Keane RPH on 6/10/2025 at 6:34 PM

## 2025-06-10 NOTE — ED NOTES
Pt to er. Pt coming in for anxiety. States been going on for 2 weeks. Pt states also has CP and shaking. States feels like she can't calm down. Pt states also feels nauseous. Pt was given zofran in route by EMS. Pt resp regular. Pt states sister passed away 2 weeks ago. Pt a & o x 4. Pt states takes hydroxyzine at home for anxiety. Did not help.

## 2025-06-10 NOTE — ED PROVIDER NOTES
[facility] EMERGENCY DEPARTMENT      EMERGENCY MEDICINE     Pt Name: Elif Martínez  MRN: [id number]  Birthdate 1980  Date of evaluation: 6/10/2025  Provider: ALANIS Leyva CNP    CHIEF COMPLAINT       Chief Complaint   Patient presents with    Anxiety     HISTORY OF PRESENT ILLNESS   Elif Martínez is a pleasant 45 y.o. female with a past medical history of asthma, thyroid disease, bipolar 1 disorder, depression, history of pancreatitis, hypothyroidism, s/p gastric bypass in , esophageal varices, and alcoholic liver cirrhosis who presents accompanied by her  to ED via EMS with complaint of anxiety x2 weeks. Patient stated that around 3 weeks ago she was walking on the stairs when her foot slipped and she found down an estimated 4 stairs onto a concrete floor and hit head off a cabinet. She does not believe she lost consciousness, but states it flustered her. After fall she immediately noticed a full body tremor with spasms in her left arm. She states that her has had difficulty walking since high school when she \"was diagnosed with having neuropathy in her feet\". She did not seek evaluation after fall, but sees chiropractor regularly who stated that her \"cervical atlas and axis were 'out of place,'\" as well as having several \"ribs out of place\". She denies the chiropractor doing any imaging and his assessment was purely based on a physical exam. She did follow-up with her primary care physician who instructor her to continue to see chiropractor until he was able to determine what was wrong.    She currently presents with uncontrolled anxiety ever since sister passed away 2 weeks ago. She takes hydroxyzine as needed for anxiety, but even with several doses daily she has not been able to control her anxiety well enough. She also complains of chest pain and feeling like she \"can't calm down\". No known personal history of myocardial infarction or strokes, but father and multiple grandparents   meals)    POTASSIUM CHLORIDE 20 MEQ/15ML (10%) ORAL SOLUTION    Take 20 mEq by mouth 2 times daily     PRAMOX-PE-GLYCERIN-PETROLATUM 1-0.25-14.4-15 % CREA RECTAL CREAM    Place rectally daily as needed (rectal bleeding)    RIFAXIMIN (XIFAXAN) 550 MG TABLET    Take 1 tablet by mouth 2 times daily    SERTRALINE (ZOLOFT) 25 MG TABLET    Take 1 tablet by mouth daily    SPIRONOLACTONE (ALDACTONE) 50 MG TABLET    Take 1 tablet by mouth daily    TRAZODONE (DESYREL) 50 MG TABLET    Take 1 tablet by mouth daily    VENTOLIN  (90 BASE) MCG/ACT INHALER           ALLERGIES     has no known allergies.    FAMILY HISTORY     She indicated that the status of her mother is unknown.       SOCIAL HISTORY       Social History     Tobacco Use    Smoking status: Passive Smoke Exposure - Never Smoker    Smokeless tobacco: Never   Vaping Use    Vaping status: Never Used   Substance Use Topics    Alcohol use: Yes     Comment: socially    Drug use: No       PHYSICAL EXAM       Constitutional:  Non-toxic appearing.     Head: Normocephalic, atraumatic.    Eyes:  PERRLA.  No conjunctival injection.  Bilateral obliquely oriented nystagmus noted on exam.  EOMI intact.    ENT.  Mucous membranes dry.  Airway patent.  No drooling, trismus, or stridor.      Neck:  Neck soft and supple.  No tracheal tugging.      Chest:  Symmetric chest rise.  Chest tenderness on exam.  No rash.    Respiratory:  Lungs clear to auscultation throughout.  No rales, rhonchi, or wheezing.  No respiratory distress.    Heart:  Regular rate.  Regular rhythm.  Normal S1/S2.      Abdomen:  Soft, moderately tender to palpation in the epigastrium no guarding, rigidity, or rebound tenderness.  No palpable mass.  No organomegaly.  BS active in all 4 quadrants.    :  Deferred.    Skin:  Warm and dry.  No rashes or lesions.    Back:  Normal range of motion.  Tenderness on palpation along cervical, thoracic, and lumbar vertebrae.    Musculoskeletal:  Moves all extremities.  No  joint tenderness or deformities.  No pedal or pretibial edema.  No vascular defects.    Neuro:  GCS 15.  Awake, alert, and oriented x 4.  Speech clear and concise.  No lateralizing deficits.  Strength is 4 out of 5 in all extremities.  Sensation is intact and equal bilaterally.  Diffuse whole body tremulous movements noted.    Psych:  Normal affect.  Severely anxious mood.  Cooperative behavior.      Additional Vital Signs:  Vitals:    06/10/25 1115   BP: (!) 150/98   Pulse: 86   Resp: 16   Temp: 98.4 °F (36.9 °C)   SpO2: 98%         FORMAL DIAGNOSTIC RESULTS     RADIOLOGY: Interpretation per the Radiologist below, if available at the time of this note (none if blank):    XR CHEST (2 VW)    (Results Pending)     MRI BRAIN W WO CONTRAST   Final Result      1. Global volume loss.   2. No acute findings.               **This report has been created using voice recognition software. It may contain   minor errors which are inherent in voice recognition technology.**               Electronically signed by Dr. Lisa Palmer      MRI CERVICAL SPINE WO CONTRAST   Final Result      1. No acute findings in the cervical spine.   2. Degenerative changes in the cervical spine with mild spinal canal stenosis at   the C4-5 through C6-7 levels.               **This report has been created using voice recognition software. It may contain   minor errors which are inherent in voice recognition technology.**      Electronically signed by Dr. Lisa Palmer      MRI LUMBAR SPINE WO CONTRAST   Final Result      1. No acute findings in the lumbar spine.   2. Mild degenerative changes in the lumbar spine. There is mild spinal canal   stenosis at the L4-5 level.               **This report has been created using voice recognition software. It may contain   minor errors which are inherent in voice recognition technology.**            Electronically signed by Dr. Lisa Palmer      MRI THORACIC SPINE WO CONTRAST   Final Result      1. Normal

## 2025-06-11 PROBLEM — R25.1 TREMOR: Status: ACTIVE | Noted: 2025-06-11

## 2025-06-11 LAB
ALBUMIN SERPL BCG-MCNC: 3.6 G/DL (ref 3.4–4.9)
ALP SERPL-CCNC: 163 U/L (ref 38–126)
ALT SERPL W/O P-5'-P-CCNC: 128 U/L (ref 10–35)
ANION GAP SERPL CALC-SCNC: 16 MEQ/L (ref 8–16)
ANION GAP SERPL CALC-SCNC: 20 MEQ/L (ref 8–16)
APTT PPP: 32.2 SECONDS (ref 22–38)
AST SERPL-CCNC: 343 U/L (ref 10–35)
BASOPHILS ABSOLUTE: 0 THOU/MM3 (ref 0–0.1)
BASOPHILS NFR BLD AUTO: 0.8 %
BILIRUB CONJ SERPL-MCNC: 1.2 MG/DL (ref 0–0.2)
BILIRUB SERPL-MCNC: 1.9 MG/DL (ref 0.3–1.2)
BUN SERPL-MCNC: 4 MG/DL (ref 8–23)
BUN SERPL-MCNC: 6 MG/DL (ref 8–23)
CALCIUM SERPL-MCNC: 8.4 MG/DL (ref 8.6–10)
CALCIUM SERPL-MCNC: 8.9 MG/DL (ref 8.6–10)
CHLORIDE SERPL-SCNC: 102 MEQ/L (ref 98–111)
CHLORIDE SERPL-SCNC: 97 MEQ/L (ref 98–111)
CO2 SERPL-SCNC: 15 MEQ/L (ref 22–29)
CO2 SERPL-SCNC: 21 MEQ/L (ref 22–29)
CREAT SERPL-MCNC: 0.7 MG/DL (ref 0.5–0.9)
CREAT SERPL-MCNC: 0.7 MG/DL (ref 0.5–0.9)
DEPRECATED RDW RBC AUTO: 42.8 FL (ref 35–45)
EKG ATRIAL RATE: 104 BPM
EKG P AXIS: 59 DEGREES
EKG P-R INTERVAL: 150 MS
EKG Q-T INTERVAL: 374 MS
EKG QRS DURATION: 58 MS
EKG QTC CALCULATION (BAZETT): 491 MS
EKG R AXIS: 16 DEGREES
EKG T AXIS: 30 DEGREES
EKG VENTRICULAR RATE: 104 BPM
EOSINOPHIL NFR BLD AUTO: 0.8 %
EOSINOPHILS ABSOLUTE: 0 THOU/MM3 (ref 0–0.4)
ERYTHROCYTE [DISTWIDTH] IN BLOOD BY AUTOMATED COUNT: 12.3 % (ref 11.5–14.5)
FOLATE SERPL-MCNC: > 20 NG/ML (ref 4.6–34.8)
GFR SERPL CREATININE-BSD FRML MDRD: > 90 ML/MIN/1.73M2
GFR SERPL CREATININE-BSD FRML MDRD: > 90 ML/MIN/1.73M2
GLUCOSE BLD STRIP.AUTO-MCNC: 140 MG/DL (ref 70–108)
GLUCOSE SERPL-MCNC: 121 MG/DL (ref 74–109)
GLUCOSE SERPL-MCNC: 85 MG/DL (ref 74–109)
HCT VFR BLD AUTO: 36.9 % (ref 37–47)
HGB BLD-MCNC: 12.7 GM/DL (ref 12–16)
IMM GRANULOCYTES # BLD AUTO: 0.01 THOU/MM3 (ref 0–0.07)
IMM GRANULOCYTES NFR BLD AUTO: 0.3 %
INR PPP: 1.45 (ref 0.85–1.13)
LIPASE SERPL-CCNC: 153 U/L (ref 13–60)
LYMPHOCYTES ABSOLUTE: 1.1 THOU/MM3 (ref 1–4.8)
LYMPHOCYTES NFR BLD AUTO: 31.6 %
MAGNESIUM SERPL-MCNC: 1.6 MG/DL (ref 1.6–2.6)
MAGNESIUM SERPL-MCNC: 1.7 MG/DL (ref 1.6–2.6)
MCH RBC QN AUTO: 32.7 PG (ref 26–33)
MCHC RBC AUTO-ENTMCNC: 34.4 GM/DL (ref 32.2–35.5)
MCV RBC AUTO: 95.1 FL (ref 81–99)
MONOCYTES ABSOLUTE: 0.4 THOU/MM3 (ref 0.4–1.3)
MONOCYTES NFR BLD AUTO: 10.6 %
NEUTROPHILS ABSOLUTE: 2 THOU/MM3 (ref 1.8–7.7)
NEUTROPHILS NFR BLD AUTO: 55.9 %
NRBC BLD AUTO-RTO: 0 /100 WBC
OSMOLALITY SERPL CALC.SUM OF ELEC: 274.4 MOSMOL/KG (ref 275–300)
PLATELET # BLD AUTO: 74 THOU/MM3 (ref 130–400)
PMV BLD AUTO: 11.1 FL (ref 9.4–12.4)
POTASSIUM SERPL-SCNC: 3.3 MEQ/L (ref 3.5–5.2)
POTASSIUM SERPL-SCNC: 3.5 MEQ/L (ref 3.5–5.2)
PROLACTIN SERPL-MCNC: 59.6 NG/ML
PROT SERPL-MCNC: 6.3 G/DL (ref 6.4–8.3)
PROTHROMBIN TIME: 16.6 SECONDS (ref 10–13.5)
RBC # BLD AUTO: 3.88 MILL/MM3 (ref 4.2–5.4)
SODIUM SERPL-SCNC: 132 MEQ/L (ref 135–145)
SODIUM SERPL-SCNC: 139 MEQ/L (ref 135–145)
VIT B12 SERPL-MCNC: > 2000 PG/ML (ref 232–1245)
WBC # BLD AUTO: 3.6 THOU/MM3 (ref 4.8–10.8)

## 2025-06-11 PROCEDURE — 83735 ASSAY OF MAGNESIUM: CPT

## 2025-06-11 PROCEDURE — 93010 ELECTROCARDIOGRAM REPORT: CPT | Performed by: INTERNAL MEDICINE

## 2025-06-11 PROCEDURE — 84425 ASSAY OF VITAMIN B-1: CPT

## 2025-06-11 PROCEDURE — 84146 ASSAY OF PROLACTIN: CPT

## 2025-06-11 PROCEDURE — 85610 PROTHROMBIN TIME: CPT

## 2025-06-11 PROCEDURE — 2140000000 HC CCU INTERMEDIATE R&B

## 2025-06-11 PROCEDURE — 36415 COLL VENOUS BLD VENIPUNCTURE: CPT

## 2025-06-11 PROCEDURE — 6360000002 HC RX W HCPCS

## 2025-06-11 PROCEDURE — 83690 ASSAY OF LIPASE: CPT

## 2025-06-11 PROCEDURE — 6370000000 HC RX 637 (ALT 250 FOR IP)

## 2025-06-11 PROCEDURE — 99233 SBSQ HOSP IP/OBS HIGH 50: CPT | Performed by: STUDENT IN AN ORGANIZED HEALTH CARE EDUCATION/TRAINING PROGRAM

## 2025-06-11 PROCEDURE — 82746 ASSAY OF FOLIC ACID SERUM: CPT

## 2025-06-11 PROCEDURE — 82607 VITAMIN B-12: CPT

## 2025-06-11 PROCEDURE — 82948 REAGENT STRIP/BLOOD GLUCOSE: CPT

## 2025-06-11 PROCEDURE — 93005 ELECTROCARDIOGRAM TRACING: CPT

## 2025-06-11 PROCEDURE — 97110 THERAPEUTIC EXERCISES: CPT

## 2025-06-11 PROCEDURE — 97530 THERAPEUTIC ACTIVITIES: CPT

## 2025-06-11 PROCEDURE — 97535 SELF CARE MNGMENT TRAINING: CPT

## 2025-06-11 PROCEDURE — 85025 COMPLETE CBC W/AUTO DIFF WBC: CPT

## 2025-06-11 PROCEDURE — 2580000003 HC RX 258

## 2025-06-11 PROCEDURE — 97166 OT EVAL MOD COMPLEX 45 MIN: CPT

## 2025-06-11 PROCEDURE — 99223 1ST HOSP IP/OBS HIGH 75: CPT

## 2025-06-11 PROCEDURE — 6360000002 HC RX W HCPCS: Performed by: PHYSICIAN ASSISTANT

## 2025-06-11 PROCEDURE — 80076 HEPATIC FUNCTION PANEL: CPT

## 2025-06-11 PROCEDURE — 84100 ASSAY OF PHOSPHORUS: CPT

## 2025-06-11 PROCEDURE — 85730 THROMBOPLASTIN TIME PARTIAL: CPT

## 2025-06-11 PROCEDURE — 80048 BASIC METABOLIC PNL TOTAL CA: CPT

## 2025-06-11 RX ORDER — POTASSIUM CHLORIDE 7.45 MG/ML
10 INJECTION INTRAVENOUS
Status: COMPLETED | OUTPATIENT
Start: 2025-06-11 | End: 2025-06-12

## 2025-06-11 RX ORDER — PHENOBARBITAL SODIUM 65 MG/ML
260 INJECTION, SOLUTION INTRAMUSCULAR; INTRAVENOUS
Status: DISCONTINUED | OUTPATIENT
Start: 2025-06-11 | End: 2025-06-16 | Stop reason: HOSPADM

## 2025-06-11 RX ORDER — PHENOBARBITAL SODIUM 65 MG/ML
130 INJECTION, SOLUTION INTRAMUSCULAR; INTRAVENOUS
Status: DISCONTINUED | OUTPATIENT
Start: 2025-06-11 | End: 2025-06-16 | Stop reason: HOSPADM

## 2025-06-11 RX ORDER — MIDAZOLAM HYDROCHLORIDE 1 MG/ML
4 INJECTION, SOLUTION INTRAMUSCULAR; INTRAVENOUS ONCE
Status: DISCONTINUED | OUTPATIENT
Start: 2025-06-11 | End: 2025-06-16 | Stop reason: HOSPADM

## 2025-06-11 RX ORDER — MIDAZOLAM HYDROCHLORIDE 1 MG/ML
INJECTION, SOLUTION INTRAMUSCULAR; INTRAVENOUS
Status: COMPLETED
Start: 2025-06-11 | End: 2025-06-11

## 2025-06-11 RX ADMIN — RIFAXIMIN 550 MG: 550 TABLET ORAL at 20:13

## 2025-06-11 RX ADMIN — BUSPIRONE HYDROCHLORIDE 15 MG: 7.5 TABLET ORAL at 20:07

## 2025-06-11 RX ADMIN — TRAZODONE HYDROCHLORIDE 50 MG: 50 TABLET ORAL at 20:06

## 2025-06-11 RX ADMIN — LACTULOSE 10 G: 20 SOLUTION ORAL at 10:04

## 2025-06-11 RX ADMIN — PROCHLORPERAZINE EDISYLATE 10 MG: 5 INJECTION INTRAMUSCULAR; INTRAVENOUS at 10:04

## 2025-06-11 RX ADMIN — LAMOTRIGINE 50 MG: 25 TABLET ORAL at 20:08

## 2025-06-11 RX ADMIN — VENLAFAXINE HYDROCHLORIDE 50 MG: 50 TABLET ORAL at 18:52

## 2025-06-11 RX ADMIN — Medication 500 MG: at 10:09

## 2025-06-11 RX ADMIN — PANTOPRAZOLE SODIUM 40 MG: 40 TABLET, DELAYED RELEASE ORAL at 10:07

## 2025-06-11 RX ADMIN — POTASSIUM CHLORIDE 10 MEQ: 7.46 INJECTION, SOLUTION INTRAVENOUS at 23:48

## 2025-06-11 RX ADMIN — LACTULOSE 10 G: 20 SOLUTION ORAL at 20:06

## 2025-06-11 RX ADMIN — MAGNESIUM SULFATE HEPTAHYDRATE 2000 MG: 40 INJECTION, SOLUTION INTRAVENOUS at 22:55

## 2025-06-11 RX ADMIN — Medication 100 MG: at 10:07

## 2025-06-11 RX ADMIN — DICYCLOMINE HYDROCHLORIDE 20 MG: 20 TABLET ORAL at 10:07

## 2025-06-11 RX ADMIN — POTASSIUM CHLORIDE 10 MEQ: 7.46 INJECTION, SOLUTION INTRAVENOUS at 22:58

## 2025-06-11 RX ADMIN — PHENOBARBITAL SODIUM 500 MG: 65 INJECTION INTRAMUSCULAR; INTRAVENOUS at 21:54

## 2025-06-11 RX ADMIN — DICYCLOMINE HYDROCHLORIDE 20 MG: 20 TABLET ORAL at 14:20

## 2025-06-11 RX ADMIN — PANTOPRAZOLE SODIUM 40 MG: 40 TABLET, DELAYED RELEASE ORAL at 18:52

## 2025-06-11 RX ADMIN — VENLAFAXINE HYDROCHLORIDE 50 MG: 50 TABLET ORAL at 10:05

## 2025-06-11 RX ADMIN — MIDAZOLAM HYDROCHLORIDE 2 MG: 1 INJECTION, SOLUTION INTRAMUSCULAR; INTRAVENOUS at 21:34

## 2025-06-11 RX ADMIN — DICYCLOMINE HYDROCHLORIDE 20 MG: 20 TABLET ORAL at 18:52

## 2025-06-11 RX ADMIN — BUSPIRONE HYDROCHLORIDE 15 MG: 7.5 TABLET ORAL at 14:21

## 2025-06-11 RX ADMIN — LAMOTRIGINE 50 MG: 25 TABLET ORAL at 10:06

## 2025-06-11 RX ADMIN — TRAZODONE HYDROCHLORIDE 50 MG: 50 TABLET ORAL at 10:06

## 2025-06-11 RX ADMIN — VENLAFAXINE HYDROCHLORIDE 25 MG: 50 TABLET ORAL at 14:20

## 2025-06-11 RX ADMIN — BUSPIRONE HYDROCHLORIDE 15 MG: 7.5 TABLET ORAL at 10:06

## 2025-06-11 RX ADMIN — LORAZEPAM 1 MG: 1 TABLET ORAL at 10:04

## 2025-06-11 RX ADMIN — Medication 500 MG: at 20:13

## 2025-06-11 RX ADMIN — Medication 1 TABLET: at 10:06

## 2025-06-11 RX ADMIN — Medication 10.5 MG: at 20:06

## 2025-06-11 RX ADMIN — LACTULOSE 10 G: 20 SOLUTION ORAL at 14:21

## 2025-06-11 RX ADMIN — LORAZEPAM 2 MG: 1 TABLET ORAL at 20:06

## 2025-06-11 RX ADMIN — DICYCLOMINE HYDROCHLORIDE 20 MG: 20 TABLET ORAL at 20:13

## 2025-06-11 RX ADMIN — RIFAXIMIN 550 MG: 550 TABLET ORAL at 10:06

## 2025-06-11 RX ADMIN — ENOXAPARIN SODIUM 40 MG: 100 INJECTION SUBCUTANEOUS at 10:07

## 2025-06-11 ASSESSMENT — ENCOUNTER SYMPTOMS
ABDOMINAL PAIN: 0
SHORTNESS OF BREATH: 0
SORE THROAT: 0
VOMITING: 0
TROUBLE SWALLOWING: 0
NAUSEA: 0
RHINORRHEA: 0
COUGH: 0
PHOTOPHOBIA: 0

## 2025-06-11 ASSESSMENT — VISUAL ACUITY: VA_NORMAL: 1

## 2025-06-11 ASSESSMENT — PAIN SCALES - GENERAL: PAINLEVEL_OUTOF10: 5

## 2025-06-11 ASSESSMENT — PAIN DESCRIPTION - ORIENTATION: ORIENTATION: MID

## 2025-06-11 ASSESSMENT — PAIN DESCRIPTION - LOCATION: LOCATION: ABDOMEN

## 2025-06-11 ASSESSMENT — PAIN DESCRIPTION - DESCRIPTORS: DESCRIPTORS: CRAMPING

## 2025-06-11 NOTE — PROGRESS NOTES
Patient is trying to leave, walked out into the hallway with her walker, states has to meet her brother in the garage, explained, she is a patient unable to leave the floor, has a IV in, still unable to obtain drug screen at this time. Dr. Fraga notified, patient would have to sign out AMA, patient educated on importance of getting care, PT/OT, stated \"would stay for now\"

## 2025-06-11 NOTE — PROCEDURES
PROCEDURE NOTE  Date: 6/11/2025   Name: Elif Martínez  YOB: 1980    Procedures        EKG was completed and handed to RN

## 2025-06-11 NOTE — PROGRESS NOTES
[facility]  PHYSICAL THERAPY MISSED TREATMENT NOTE  STRZ MED SURG 8AB    Date: 2025  Patient Name: Elif Martínez        MRN: [id number]   : 1980  (45 y.o.)  Gender: female                REASON FOR MISSED TREATMENT:  Missed Treat.  OT with pt, will check back as able.

## 2025-06-11 NOTE — PROGRESS NOTES
Hospitalist Progress Note      Patient:  Elif Martínez    Unit/Bed:8A-20/020-A  YOB: 1980  MRN: [id number]   Acct: [id number]   PCP: Chichi Salmeron APRN - CNP  Date of Admission: 6/10/2025    Assessment/Plan:    Acute pancreatitis: Presenting the hospital with acute abdominal pain nausea vomiting.  Lipase on presentation 150, presenting with abdominal pain and nausea/vomiting.  CT abdomen pelvis showing peripancreatic edema with fullness of the head of the pancreas suspicious for acute pancreatitis, 3 mm calcification of the head of the pancreas.  she reports he had a history of pancreatitis in 2014.  Continue IVF,  normal saline, clear liquid diet. Scheduled Compazine with meals.  Continue Dilaudid pain panel for abdominal pain for now.     New AV node block (Mobitz type I): Likely medication induced, will hold Coreg.  Resolved on repeat EKG. Sinus tachycardia with PACs on repeat EKG. Continue telemetry.     History of alcohol abuse: Reports intermittent alcohol use over the past few weeks, last reported drink 6/9/2025 patient states that she had 4-5 white claws at that time.     New tremor, likely essential tremor, concern for alcohol withdrawal: Patient reports new tremor bilateral upper and lower extremities over the past 1 to 2 weeks, worsening over that time.  Patient states that when she drinks alcohol the tremor disappears.  MRI brain negative for any acute findings.  Neurology recommending psych eval. PT OT ordered. CIWA protocol with Ativan.      Alcoholic cirrhosis with history of paraesophageal varices, with acute hepatitis: Follows with GI at [facility] as well with Dr. Sahu at [facility], CT showing  paraesophageal and gastric varices unchanged from prior exam.    -Coreg 3.125 mg twice daily in setting of new AV je block pulm medications include as needed Lactulose, Bumex 1 mg twice daily, Aldactone 50 mg daily, rifaximin 550  contain minor errors which are inherent in voice recognition technology.** Electronically signed by Dr. Lisa Palmer    MRI CERVICAL SPINE WO CONTRAST  Result Date: 6/10/2025  PROCEDURE: MRI CERVICAL SPINE WO CONTRAST CLINICAL INFORMATION: fall with head injury 3 weeks ago. Involuntary whole-body muscle spasms. Oblique nystagmus bilaterally. . COMPARISON: No prior study. TECHNIQUE: Sagittal T1, T2 and STIR sequences were obtained through the cervical spine. Axial fast spin echo and gradient echo T2-weighted images were obtained. FINDINGS: The cervical vertebral bodies are normally aligned. There are degenerative marrow changes in the endplates. There is disc desiccation throughout. No suspicious osseous lesions are present.  The facet joints are normally aligned. The cervical spinal cord is of normal caliber and signal intensity.  The visualized aspects of the posterior fossa are normal. On the axial images, at C2-C3, there are right-sided facet degenerative changes. There is no spinal canal stenosis. There is no foraminal stenosis. At C3-C4, there is a small posterior disc osteophyte complex. There are mild facet degenerative changes. There is no spinal canal stenosis. There is mild bilateral foraminal stenosis. At C4-C5, there is a small posterior disc osteophyte complex. There is mild spinal canal stenosis. There is mild left foraminal stenosis. At C5-C6, there is a small posterior disc osteophyte complex. There are bilateral uncovertebral joint degenerative changes. There is mild spinal canal stenosis. There is moderate severity bilateral foraminal stenosis. At C6-C7, there is a small posterior disc osteophyte complex. There are bilateral uncovertebral joint degenerative changes. There is mild spinal canal stenosis. There is no foraminal stenosis. At C7-T1, there is no spinal canal or foraminal stenosis. There are no suspicious findings in the cervical soft tissues.     1. No acute findings in the cervical

## 2025-06-11 NOTE — PROGRESS NOTES
[facility]  INPATIENT OCCUPATIONAL THERAPY  STRZ MED SURG 8AB  EVALUATION      Discharge Recommendations: Continue to assess pending progress, Home with Home health OT  Equipment Recommendations: Yes Grabbars in shower       Time In: 845  Time Out: 945  Timed Code Treatment Minutes: 45 Minutes  Minutes: 60          Date: 2025  Patient Name: Elif Martínez,   Gender: female      MRN: [id number]  : 1980  (45 y.o.)  Referring Practitioner: Dr. FRANCI Saldana MD  Diagnosis: Abdominal pain  Additional Pertinent Hx: Pt with PMHx of alcoholic cirrhosis, alcohol abuse, hypothyroidism who presents to [facility] with nausea vomiting abdominal pain and new tremor.  Patient reports nausea vomiting abdominal pain over the past 24 hours, history of alcoholic cirrhosis reports that she recently started drinking again secondary to social stressors.  Last drink was yesterday reports that she had 4-5 white claws.  On presentation to the hospital states that she had intractable nausea vomiting and abdominal pain.  CT abdomen pelvis positive for pancreatitis.     Reports new onset of tremor over the past 1 to 2 weeks, bilateral upper and lower extremities states that she had a fall and after which started developing tremors which have worsened over the past 2 weeks now states that she is unable to walk.  Patient reports that when she drinks alcohol the tremors disappear.    Restrictions/Precautions:  Restrictions/Precautions: Fall Risk, Isolation    Subjective  Chart Reviewed: Yes, Orders, History and Physical    Subjective: Pleasant and cooperative  Comments: RN approved session.  Pt agreed to go for a walk.   She had anxiety about this and requested that a W/C follow behind her.  She has soreness in her anterior shoulders while walking.  She has abdominal pain which fluxuates during the day.   She did request a pain med.  Her nurse was informed.    Pain: No number given- soreness in her  shoulders.  She anticipated having more abdominal pain later this morning after her breakfast.  Nursing was aware.    Vitals: Vitals not assessed per clinical judgement, see nursing flowsheet    Social/Functional History:  Lives With: Spouse  Type of Home: House  Home Layout: Multi-level, Laundry in basement  Home Access: Stairs to enter with rails  Entrance Stairs - Number of Steps: 3 steps with a handrail to enter but 3 steps + 7 steps to go to main level and another flight to get upstairs.  Pt has rails on all but the steps going into basement.  Home Equipment: Walker - Rolling   Bathroom Shower/Tub: Tub/Shower unit, Walk-in shower, Shower chair with back  Bathroom Toilet: Standard  Bathroom Accessibility: Accessible    Receives Help From: Family  Prior Level of Assist for ADLs: Independent  Prior Level of Assist for Homemaking: Needs assistance  Homemaking Responsibilities: Yes  Prior Level of Assist for Transfers: Independent  Prior Level of Assist for Ambulation: Independent community ambulator, with or without device  Has the patient had two or more falls in the past year or any fall with injury in the past year?: Yes       Occupation: On disability  Leisure & Hobbies: Playing with her 2 dogs; doing puzzles or math problems online  Additional Comments: Pt was not using any AD recently. She has been doing her own self care.  She struggled with doing laundry secondary to carrying basket to/from the basement.  She had a fall 2 weeks ago and saw a chiropractor after.  She has had more difficulty with legs feeling unsteady and increase in  the tremors in her hands.  She sleeps over 8 hours every night and often straight through the night.    VISION:Corrected    HEARING:  WFL    COGNITION: WFL    RANGE OF MOTION:  Bilateral Upper Extremity:  WFL    STRENGTH:  Bilateral Upper Extremity:  Impaired - 3+/5 deltoid and pectoral; 4/5 biceps and triceps   Strength:  Right: 58 lbs (avg of 2)          Left: 60 lbs (avg

## 2025-06-11 NOTE — CARE COORDINATION
6/11/25, 10:28 AM EDT    DISCHARGE PLANNING EVALUATION    Received Social Work consult “For consideration of Rehab”.  Addiction/HENNA  consulted.   met with patient, following for needs.  Full Social Consult deferred.      For any further discharge needs, please re-consult SW. CM is currently following PT/OT recommendations for support at discharge.

## 2025-06-11 NOTE — PLAN OF CARE
Problem: Discharge Planning  Goal: Discharge to home or other facility with appropriate resources  Outcome: Progressing  Flowsheets (Taken 6/11/2025 0757)  Discharge to home or other facility with appropriate resources: Identify barriers to discharge with patient and caregiver     Problem: Seizure Precautions  Goal: Remains free of injury related to seizures activity  Outcome: Progressing  Flowsheets (Taken 6/11/2025 0757)  Remains free of injury related to seizure activity: Maintain airway, patient safety  and administer oxygen as ordered     Problem: Skin/Tissue Integrity  Goal: Skin integrity remains intact  Description: 1.  Monitor for areas of redness and/or skin breakdown2.  Assess vascular access sites hourly3.  Every 4-6 hours minimum:  Change oxygen saturation probe site4.  Every 4-6 hours:  If on nasal continuous positive airway pressure, respiratory therapy assess nares and determine need for appliance change or resting period  Outcome: Progressing  Flowsheets (Taken 6/11/2025 0757)  Skin Integrity Remains Intact: Monitor for areas of redness and/or skin breakdown     Problem: ABCDS Injury Assessment  Goal: Absence of physical injury  Outcome: Progressing  Flowsheets (Taken 6/11/2025 0757)  Absence of Physical Injury: Implement safety measures based on patient assessment     Problem: Safety - Adult  Goal: Free from fall injury  Outcome: Progressing  Flowsheets (Taken 6/11/2025 0757)  Free From Fall Injury: Instruct family/caregiver on patient safety

## 2025-06-11 NOTE — CARE COORDINATION
Case Management Assessment Initial Evaluation    Date/Time of Evaluation: 2025 8:39 AM  Assessment Completed by: Kayla Ervin RN    If patient is discharged prior to next notation, then this note serves as note for discharge by case management.    Patient Name: Elif Martínez                   YOB: 1980  Diagnosis: Dehydration [E86.0]  Abdominal pain [R10.9]  Anxiety state [F41.1]  Difficulty walking [R26.2]  Involuntary muscle contractions [M62.40]  Alcohol-induced acute pancreatitis, unspecified complication status [K85.20]  Nausea and vomiting, unspecified vomiting type [R11.2]                   Date / Time: 6/10/2025 11:11 AM  Location: 8A-/020-A     Patient Admission Status: Inpatient   Readmission Risk Low 0-14, Mod 15-19), High > 20: Readmission Risk Score: 14.5    Current PCP: Chichi Salmeron, ALANIS - CNP  Health Care Decision Makers:     Additional Case Management Notes: Admitted from ER with reports of anxiety, chest pain, and shaking. Pancreatitis. New AV block. Etoh withdrawal. Consult to Neurology for new tremors. Clear liquid diet. Therapy ordered. IVF. Lactulose. CIWA scale with Morphine.     Procedures: none    Imagin/10 CT Abd/Plv W: 1. Peripancreatic edema with fullness at the head of the pancreas may indicate acute pancreatitis. A 3 mm calcification at the head of the pancreas may be   within the pancreatic duct (series 301, image 33). The gallbladder is absent. No biliary ductal dilatation is identified. Hepatomegaly and hepatic steatosis are observed. Correlate with liver function tests and pancreatic enzymes. Close clinical follow-up and follow-up to ensure resolution is advised.   2. Paraesophageal and gastric varices appear unchanged. Varices along the anterior abdominal wall at the level of the umbilicus appears stable. No bowel obstruction, fluid collection, or free air is observed.   3. The urinary bladder is under distended and not well assessed.    Would you like for me to discuss the discharge plan with any other family members/significant others, and if so, who? No   Financial Resources Medicare;Medicaid   [facility])   Discharge Planning   Type of Residence House   Living Arrangements Spouse/Significant Other;Family Members   Current Services Prior To Admission None   Potential Assistance Needed Durable Medical Equipment;Outpatient PT/OT;Home Care   Potential DME Needed Walker   DME Ordered? No   Potential Assistance Purchasing Medications No   Type of Home Care Services PT;OT   Services At/After Discharge   Transition of Care Consult (CM Consult) Discharge Planning   Mode of Transport at Discharge Other (see comment)  (family)   Confirm Follow Up Transport Family   Condition of Participation: Discharge Planning   The Plan for Transition of Care is related to the following treatment goals: get stronger

## 2025-06-11 NOTE — CONSULTS
Neurology Consult Note    Date:2025       Room:[address] [place] [address]  Patient Name:Elif Martínez     YOB: 1980     Age:45 y.o.    Requesting Physician: Costa Fraga MD     Reason for Consult:  Evaluate for new tremors      Chief Complaint:   Chief Complaint   Patient presents with    Anxiety       Subjective     Elif Martínez is a 45 y.o. female with a history of ascites, asthma, bipolar 1 disorder, depression, pancreatitis, hypothyroidism, esophageal varices, who presented to [facility] ED yesterday due to anxiety and chest pain. Our service was consulted today for upper extremity tremors x 2 weeks. Patient states tremors started after falling down her basement stairs (approximately 10) and hitting the left side of her head two weeks ago. She denies LOC following the fall. She states tremors are worse with action and increased anxiety. She reports increased anxiety since her sister  two weeks ago and talking about this increased the amplitude of her tremors. She states tremors improve with rest, calming herself down, and alcohol. She is on several psychotropic medications - Lamictal, Buspar, Effexor, and trazodone, but denies any changes within the last couple weeks. She states she follows up with her psychiatrist next week. She reports intermittent numbness and tingling in bilateral arms and across her chest, as well as chronic neuropathy of her BLE. She reports gait disturbance due to her neuropathy, for which she often is made fun of. She denies headaches, vision changes, extremity weakness, recent illness/fevers/chills. MRI brain W/WO negative for acute abnormalities.     Review of Systems   Review of Systems   Constitutional:  Negative for chills and fever.   HENT:  Negative for rhinorrhea, sore throat and trouble swallowing.    Eyes:  Negative for photophobia and visual disturbance.   Respiratory:  Negative for cough and shortness of breath.    Cardiovascular:  Positive for chest pain. Negative for  Take 1 tablet by mouth daily 25 mg daily at noon; 50 mg in the morning and evening      venlafaxine (EFFEXOR) 50 MG tablet 1 tablet 2 times daily 25 mg daily at noon; 50 mg in the morning and evening      ondansetron (ZOFRAN) 4 MG tablet Take 1 tablet by mouth every 8 hours as needed for Nausea or Vomiting 12 tablet 0    hydrOXYzine HCl (ATARAX) 25 MG tablet Take 1 tablet by mouth 3 times daily as needed for Itching      lamoTRIgine (LAMICTAL) 100 MG tablet Take 0.5 tablets by mouth 2 times daily      busPIRone (BUSPAR) 15 MG tablet Take 15 mg by mouth 3 times daily      carvedilol (COREG) 3.125 MG tablet Take 1 tablet by mouth 2 times daily (with meals)      melatonin 3 MG TABS tablet Take 10 mg by mouth daily      VENTOLIN  (90 Base) MCG/ACT inhaler   0    dicyclomine (BENTYL) 20 MG tablet Take 1 tablet by mouth 4 times daily For abdominal cramping 20 tablet 0    traZODone (DESYREL) 50 MG tablet Take 1 tablet by mouth daily 30 tablet 0    Magnesium Oxide 500 MG TABS Take 500 mg by mouth 2 times daily 60 tablet 1    lactulose (CEPHULAC) 10 g packet Take 1 packet by mouth 3 times daily      spironolactone (ALDACTONE) 50 MG tablet Take 1 tablet by mouth daily 30 tablet 3    pantoprazole (PROTONIX) 40 MG tablet Take 1 tablet by mouth 2 times daily (with meals)      rifaximin (XIFAXAN) 550 MG tablet Take 1 tablet by mouth 2 times daily      levothyroxine (SYNTHROID) 25 MCG tablet Take 1 tablet by mouth Daily (Patient not taking: Reported on 6/10/2025)      cyanocobalamin 1000 MCG/ML injection Inject 1 mL into the muscle every 30 days (Patient not taking: Reported on 6/10/2025)      bumetanide (BUMEX) 1 MG tablet Take 1 tablet by mouth 2 times daily (Patient not taking: Reported on 6/10/2025) 30 tablet 3    Pramox-PE-Glycerin-Petrolatum 1-0.25-14.4-15 % CREA rectal cream Place rectally daily as needed (rectal bleeding) (Patient not taking: Reported on 6/10/2025)      acetaminophen (TYLENOL) 160 MG/5ML suspension  visualized aspects of the posterior fossa are normal. On the axial images, at C2-C3, there are right-sided facet degenerative changes. There is no spinal canal stenosis. There is no foraminal stenosis. At C3-C4, there is a small posterior disc osteophyte complex. There are mild facet degenerative changes. There is no spinal canal stenosis. There is mild bilateral foraminal stenosis. At C4-C5, there is a small posterior disc osteophyte complex. There is mild spinal canal stenosis. There is mild left foraminal stenosis. At C5-C6, there is a small posterior disc osteophyte complex. There are bilateral uncovertebral joint degenerative changes. There is mild spinal canal stenosis. There is moderate severity bilateral foraminal stenosis. At C6-C7, there is a small posterior disc osteophyte complex. There are bilateral uncovertebral joint degenerative changes. There is mild spinal canal stenosis. There is no foraminal stenosis. At C7-T1, there is no spinal canal or foraminal stenosis. There are no suspicious findings in the cervical soft tissues.     1. No acute findings in the cervical spine. 2. Degenerative changes in the cervical spine with mild spinal canal stenosis at the C4-5 through C6-7 levels. **This report has been created using voice recognition software. It may contain minor errors which are inherent in voice recognition technology.** Electronically signed by Dr. Lisa Palmer    MRI BRAIN W WO CONTRAST  Result Date: 6/10/2025  PROCEDURE: MRI BRAIN W WO CONTRAST CLINICAL INFORMATION: fall with head injury 3 weeks ago.  Involuntary whole-body muscle spasms.  Oblique nystagmus bilaterally.. Fell with head injury 3 weeks ago. Involuntary whole body muscle spasms. COMPARISON: Head CT 2/14/2019. TECHNIQUE: Multiplanar and multiple spin echo T1 and T2-weighted images were obtained through the brain before and after the administration of intravenous contrast. FINDINGS: The diffusion-weighted images are normal. There

## 2025-06-12 ENCOUNTER — APPOINTMENT (OUTPATIENT)
Dept: MRI IMAGING | Age: 45
DRG: 439 | End: 2025-06-12
Payer: MEDICARE

## 2025-06-12 LAB
ANION GAP SERPL CALC-SCNC: 17 MEQ/L (ref 8–16)
BASOPHILS ABSOLUTE: 0 THOU/MM3 (ref 0–0.1)
BASOPHILS NFR BLD AUTO: 1.3 %
BUN SERPL-MCNC: 3 MG/DL (ref 8–23)
CALCIUM SERPL-MCNC: 8.9 MG/DL (ref 8.6–10)
CHLORIDE SERPL-SCNC: 100 MEQ/L (ref 98–111)
CO2 SERPL-SCNC: 21 MEQ/L (ref 22–29)
CREAT SERPL-MCNC: 0.6 MG/DL (ref 0.5–0.9)
DEPRECATED RDW RBC AUTO: 43.6 FL (ref 35–45)
EOSINOPHIL NFR BLD AUTO: 2.2 %
EOSINOPHILS ABSOLUTE: 0.1 THOU/MM3 (ref 0–0.4)
ERYTHROCYTE [DISTWIDTH] IN BLOOD BY AUTOMATED COUNT: 12 % (ref 11.5–14.5)
GFR SERPL CREATININE-BSD FRML MDRD: > 90 ML/MIN/1.73M2
GLUCOSE SERPL-MCNC: 97 MG/DL (ref 74–109)
HCT VFR BLD AUTO: 37.6 % (ref 37–47)
HGB BLD-MCNC: 12.3 GM/DL (ref 12–16)
IMM GRANULOCYTES # BLD AUTO: 0.01 THOU/MM3 (ref 0–0.07)
IMM GRANULOCYTES NFR BLD AUTO: 0.3 %
LACTATE SERPL-SCNC: 0.7 MMOL/L (ref 0.5–2.2)
LIPASE SERPL-CCNC: 67 U/L (ref 13–60)
LYMPHOCYTES ABSOLUTE: 1 THOU/MM3 (ref 1–4.8)
LYMPHOCYTES NFR BLD AUTO: 32.7 %
MCH RBC QN AUTO: 32.1 PG (ref 26–33)
MCHC RBC AUTO-ENTMCNC: 32.7 GM/DL (ref 32.2–35.5)
MCV RBC AUTO: 98.2 FL (ref 81–99)
MONOCYTES ABSOLUTE: 0.3 THOU/MM3 (ref 0.4–1.3)
MONOCYTES NFR BLD AUTO: 11.2 %
NEUTROPHILS ABSOLUTE: 1.6 THOU/MM3 (ref 1.8–7.7)
NEUTROPHILS NFR BLD AUTO: 52.3 %
NRBC BLD AUTO-RTO: 0 /100 WBC
PHOSPHATE SERPL-MCNC: 2.5 MG/DL (ref 2.5–4.5)
PLATELET # BLD AUTO: 73 THOU/MM3 (ref 130–400)
PMV BLD AUTO: 11.4 FL (ref 9.4–12.4)
POTASSIUM SERPL-SCNC: 3.1 MEQ/L (ref 3.5–5.2)
PROCALCITONIN SERPL IA-MCNC: 0.09 NG/ML (ref 0.01–0.09)
RBC # BLD AUTO: 3.83 MILL/MM3 (ref 4.2–5.4)
SODIUM SERPL-SCNC: 138 MEQ/L (ref 135–145)
TSH SERPL DL<=0.05 MIU/L-ACNC: 2.93 UIU/ML (ref 0.27–4.2)
WBC # BLD AUTO: 3.1 THOU/MM3 (ref 4.8–10.8)

## 2025-06-12 PROCEDURE — 6370000000 HC RX 637 (ALT 250 FOR IP)

## 2025-06-12 PROCEDURE — 80048 BASIC METABOLIC PNL TOTAL CA: CPT

## 2025-06-12 PROCEDURE — 2500000003 HC RX 250 WO HCPCS

## 2025-06-12 PROCEDURE — 6360000002 HC RX W HCPCS: Performed by: STUDENT IN AN ORGANIZED HEALTH CARE EDUCATION/TRAINING PROGRAM

## 2025-06-12 PROCEDURE — 99233 SBSQ HOSP IP/OBS HIGH 50: CPT | Performed by: STUDENT IN AN ORGANIZED HEALTH CARE EDUCATION/TRAINING PROGRAM

## 2025-06-12 PROCEDURE — 84145 PROCALCITONIN (PCT): CPT

## 2025-06-12 PROCEDURE — 6360000002 HC RX W HCPCS: Performed by: PHYSICIAN ASSISTANT

## 2025-06-12 PROCEDURE — 74183 MRI ABD W/O CNTR FLWD CNTR: CPT

## 2025-06-12 PROCEDURE — 87040 BLOOD CULTURE FOR BACTERIA: CPT

## 2025-06-12 PROCEDURE — 2140000000 HC CCU INTERMEDIATE R&B

## 2025-06-12 PROCEDURE — 6360000004 HC RX CONTRAST MEDICATION

## 2025-06-12 PROCEDURE — 85025 COMPLETE CBC W/AUTO DIFF WBC: CPT

## 2025-06-12 PROCEDURE — 83605 ASSAY OF LACTIC ACID: CPT

## 2025-06-12 PROCEDURE — 6360000002 HC RX W HCPCS

## 2025-06-12 PROCEDURE — A9579 GAD-BASE MR CONTRAST NOS,1ML: HCPCS

## 2025-06-12 PROCEDURE — 84443 ASSAY THYROID STIM HORMONE: CPT

## 2025-06-12 PROCEDURE — 2580000003 HC RX 258: Performed by: STUDENT IN AN ORGANIZED HEALTH CARE EDUCATION/TRAINING PROGRAM

## 2025-06-12 PROCEDURE — 83690 ASSAY OF LIPASE: CPT

## 2025-06-12 PROCEDURE — 36415 COLL VENOUS BLD VENIPUNCTURE: CPT

## 2025-06-12 RX ORDER — POTASSIUM CHLORIDE 7.45 MG/ML
10 INJECTION INTRAVENOUS
Status: ACTIVE | OUTPATIENT
Start: 2025-06-12 | End: 2025-06-12

## 2025-06-12 RX ORDER — SODIUM CHLORIDE, SODIUM LACTATE, POTASSIUM CHLORIDE, CALCIUM CHLORIDE 600; 310; 30; 20 MG/100ML; MG/100ML; MG/100ML; MG/100ML
INJECTION, SOLUTION INTRAVENOUS CONTINUOUS
Status: ACTIVE | OUTPATIENT
Start: 2025-06-12 | End: 2025-06-13

## 2025-06-12 RX ORDER — GADOTERIDOL 279.3 MG/ML
15 INJECTION INTRAVENOUS
Status: COMPLETED | OUTPATIENT
Start: 2025-06-12 | End: 2025-06-12

## 2025-06-12 RX ADMIN — VENLAFAXINE HYDROCHLORIDE 50 MG: 50 TABLET ORAL at 18:15

## 2025-06-12 RX ADMIN — PHENOBARBITAL SODIUM 130 MG: 65 INJECTION INTRAMUSCULAR at 20:14

## 2025-06-12 RX ADMIN — Medication 500 MG: at 20:05

## 2025-06-12 RX ADMIN — POTASSIUM CHLORIDE 10 MEQ: 7.46 INJECTION, SOLUTION INTRAVENOUS at 01:51

## 2025-06-12 RX ADMIN — SODIUM CHLORIDE, PRESERVATIVE FREE 10 ML: 5 INJECTION INTRAVENOUS at 20:17

## 2025-06-12 RX ADMIN — POTASSIUM CHLORIDE 10 MEQ: 7.46 INJECTION, SOLUTION INTRAVENOUS at 18:21

## 2025-06-12 RX ADMIN — PHENOBARBITAL SODIUM 260 MG: 65 INJECTION INTRAMUSCULAR at 09:12

## 2025-06-12 RX ADMIN — DICYCLOMINE HYDROCHLORIDE 20 MG: 20 TABLET ORAL at 20:05

## 2025-06-12 RX ADMIN — LACTULOSE 10 G: 20 SOLUTION ORAL at 20:09

## 2025-06-12 RX ADMIN — BUSPIRONE HYDROCHLORIDE 15 MG: 7.5 TABLET ORAL at 20:05

## 2025-06-12 RX ADMIN — PHENOBARBITAL SODIUM 130 MG: 65 INJECTION INTRAMUSCULAR at 18:09

## 2025-06-12 RX ADMIN — POTASSIUM CHLORIDE 10 MEQ: 7.46 INJECTION, SOLUTION INTRAVENOUS at 00:49

## 2025-06-12 RX ADMIN — POTASSIUM CHLORIDE 10 MEQ: 7.46 INJECTION, SOLUTION INTRAVENOUS at 16:55

## 2025-06-12 RX ADMIN — PIPERACILLIN AND TAZOBACTAM 4500 MG: 4; .5 INJECTION, POWDER, LYOPHILIZED, FOR SOLUTION INTRAVENOUS at 20:01

## 2025-06-12 RX ADMIN — SODIUM CHLORIDE, SODIUM LACTATE, POTASSIUM CHLORIDE, AND CALCIUM CHLORIDE: .6; .31; .03; .02 INJECTION, SOLUTION INTRAVENOUS at 13:33

## 2025-06-12 RX ADMIN — POTASSIUM CHLORIDE 10 MEQ: 7.46 INJECTION, SOLUTION INTRAVENOUS at 14:30

## 2025-06-12 RX ADMIN — RIFAXIMIN 550 MG: 550 TABLET ORAL at 11:08

## 2025-06-12 RX ADMIN — POTASSIUM CHLORIDE 10 MEQ: 7.46 INJECTION, SOLUTION INTRAVENOUS at 13:33

## 2025-06-12 RX ADMIN — SODIUM CHLORIDE, SODIUM LACTATE, POTASSIUM CHLORIDE, AND CALCIUM CHLORIDE: .6; .31; .03; .02 INJECTION, SOLUTION INTRAVENOUS at 20:02

## 2025-06-12 RX ADMIN — PANTOPRAZOLE SODIUM 40 MG: 40 TABLET, DELAYED RELEASE ORAL at 18:15

## 2025-06-12 RX ADMIN — GADOTERIDOL 15 ML: 279.3 INJECTION, SOLUTION INTRAVENOUS at 15:56

## 2025-06-12 RX ADMIN — PHENOBARBITAL SODIUM 260 MG: 65 INJECTION INTRAMUSCULAR at 06:18

## 2025-06-12 RX ADMIN — LAMOTRIGINE 50 MG: 25 TABLET ORAL at 20:07

## 2025-06-12 RX ADMIN — DICYCLOMINE HYDROCHLORIDE 20 MG: 20 TABLET ORAL at 18:15

## 2025-06-12 NOTE — PROGRESS NOTES
Patient received sacramental anointing by a . If you are in need of  support, please call [phone]. If you are in need of a  after 6:30pm, please call the house supervisor for the on-call .      Corry Em  [facility] Health Coordinator  [phone]

## 2025-06-12 NOTE — PROGRESS NOTES
ADDICTION SBIRT attempted; patient not located in room 3A-10. HENNA  to re-attempt at later time.

## 2025-06-12 NOTE — PROGRESS NOTES
Attempted to see patient twice this morning to complete the psychiatry consult. Patient was sleeping the first time and was not able to wake up via verbal stimuli  The second time I saw her she woke up but then immediately fell back asleep    Will attempt to reassess again her tomorrow morning.

## 2025-06-12 NOTE — PROGRESS NOTES
Utilize [facility] alcohol withdrawal scale (Based on [place] Modified Alcohol Withdrawal Scale).  Tabulate score based on classifications including Tremor, Sweating, Hallucination, Orientation, and Agitation.    Tremor: 0  Sweatin  Hallucinations: 0  Orientation: 0  Agitation: 0  Total Score: 0  Action perform as described below     Patient asleep responding only to sternal rub at this time. Will reassess in 1 hour.       Tremor:  No tremor is 0 points.  Tremor on movement is 1 point.  Tremor at rest is 2 points.  Sweating: No Sweat 0 points. Moist is 1 point.  Drenching sweats is 2 points.  Hallucinations: No present 0 points. Dissuadable is 1 point. Not dissuadable is 2 points.  Orientation: Oriented 0 points. Vague/detached 1 point. Disoriented/no contact 2 points.  Agitation: Calm 0 points.  Anxious 1 point. Panicky 2 points.    Check scale every 2 hours.  Discontinue scoring with 4 consecutive scorings of 0.  Scale 0: No phenobarbital given.  Re-assess every 60 minutes as needed.   Scale 1-3: Phenobarbital 130 mg IV over 3 minutes. Re-assess every 60 minutes as needed.  May administer every 60 minutes to a maximum dose of phenobarbital 1040 mg in 24 hours!  Scale 4-8: Phenobarbital 260 mg IV over 5 minutes.  Re-assess every 60 minutes as needed. May administer every 60 minutes to a maximum dose of phenobarbital 1040mg in 24 hours!  Scale 9-10: Transfer to ICU (if not already in ICU).  Administer 10mg/kg phenobarbital IV over 60 minutes.  Maximum dose phenobarbital is 1040mg in 24 hours!

## 2025-06-12 NOTE — PROGRESS NOTES
Utilize [facility] alcohol withdrawal scale (Based on [place] Modified Alcohol Withdrawal Scale).  Tabulate score based on classifications including Tremor, Sweating, Hallucination, Orientation, and Agitation.    Tremor: 1  Sweatin  Hallucinations: 1  Orientation: 1  Agitation: 0  Total Score: 3  Action perform as described below     Tremor:  No tremor is 0 points.  Tremor on movement is 1 point.  Tremor at rest is 2 points.  Sweating: No Sweat 0 points. Moist is 1 point.  Drenching sweats is 2 points.  Hallucinations: No present 0 points. Dissuadable is 1 point. Not dissuadable is 2 points.  Orientation: Oriented 0 points. Vague/detached 1 point. Disoriented/no contact 2 points.  Agitation: Calm 0 points.  Anxious 1 point. Panicky 2 points.    Check scale every 2 hours.  Discontinue scoring with 4 consecutive scorings of 0.  Scale 0: No phenobarbital given.  Re-assess every 60 minutes as needed.   Scale 1-3: Phenobarbital 130 mg IV over 3 minutes. Re-assess every 60 minutes as needed.  May administer every 60 minutes to a maximum dose of phenobarbital 1040 mg in 24 hours!  Scale 4-8: Phenobarbital 260 mg IV over 5 minutes.  Re-assess every 60 minutes as needed. May administer every 60 minutes to a maximum dose of phenobarbital 1040mg in 24 hours!  Scale 9-10: Transfer to ICU (if not already in ICU).  Administer 10mg/kg phenobarbital IV over 60 minutes.  Maximum dose phenobarbital is 1040mg in 24 hours!

## 2025-06-12 NOTE — PROGRESS NOTES
Utilize [facility] alcohol withdrawal scale (Based on [place] Modified Alcohol Withdrawal Scale).  Tabulate score based on classifications including Tremor, Sweating, Hallucination, Orientation, and Agitation.    Tremor: 2  Sweatin  Hallucinations: 1  Orientation: 0  Agitation: 0  Total Score: 4  [id number]  Action perform as described below     Tremor:  No tremor is 0 points.  Tremor on movement is 1 point.  Tremor at rest is 2 points.  Sweating: No Sweat 0 points. Moist is 1 point.  Drenching sweats is 2 points.  Hallucinations: No present 0 points. Dissuadable is 1 point. Not dissuadable is 2 points.  Orientation: Oriented 0 points. Vague/detached 1 point. Disoriented/no contact 2 points.  Agitation: Calm 0 points.  Anxious 1 point. Panicky 2 points.    Check scale every 2 hours.  Discontinue scoring with 4 consecutive scorings of 0.  Scale 0: No phenobarbital given.  Re-assess every 60 minutes as needed.   Scale 1-3: Phenobarbital 130 mg IV over 3 minutes. Re-assess every 60 minutes as needed.  May administer every 60 minutes to a maximum dose of phenobarbital 1040 mg in 24 hours!  Scale 4-8: Phenobarbital 260 mg IV over 5 minutes.  Re-assess every 60 minutes as needed. May administer every 60 minutes to a maximum dose of phenobarbital 1040mg in 24 hours!  Scale 9-10: Transfer to ICU (if not already in ICU).  Administer 10mg/kg phenobarbital IV over 60 minutes.  Maximum dose phenobarbital is 1040mg in 24 hours!

## 2025-06-12 NOTE — PROGRESS NOTES
[facility]  OCCUPATIONAL THERAPY MISSED TREATMENT NOTE  [facility] CCU 3A  3A-10/010-A      Date: 2025  Patient Name: Elif Martínez        CSN: [id number]   : 1980  (45 y.o.)  Gender: female   Referring Practitioner: Dr. FRANCI Saldana MD  Diagnosis: Abdominal Pain         REASON FOR MISSED TREATMENT: Hold Treatment per Nursing. ***

## 2025-06-12 NOTE — PROGRESS NOTES
Patient arrived per cart to 3A. Heart monitor applied and vitals taken.  Admission paperwork completed.  Explained to patient that [facility] is not responsible for any lost or stolen items.  Patient verbalized understanding. Oriented to room and use of call light and bed controls.  Patient denies pain or needs. No signs of distress noted.  Bed locked & in low position, side-rails up x2.  Call light in reach.  Reminded patient to call nurse if any needs arise.     2 person skin assessment performed by this nurse and GT  RN.

## 2025-06-12 NOTE — PROGRESS NOTES
Utilize [facility] alcohol withdrawal scale (Based on [place] Modified Alcohol Withdrawal Scale).  Tabulate score based on classifications including Tremor, Sweating, Hallucination, Orientation, and Agitation.    Tremor: 1  Sweatin  Hallucinations: 0  Orientation: 0  Agitation: 1  Total Score: 4  Action perform as described below     Tremor:  No tremor is 0 points.  Tremor on movement is 1 point.  Tremor at rest is 2 points.  Sweating: No Sweat 0 points. Moist is 1 point.  Drenching sweats is 2 points.  Hallucinations: No present 0 points. Dissuadable is 1 point. Not dissuadable is 2 points.  Orientation: Oriented 0 points. Vague/detached 1 point. Disoriented/no contact 2 points.  Agitation: Calm 0 points.  Anxious 1 point. Panicky 2 points.    Check scale every 2 hours.  Discontinue scoring with 4 consecutive scorings of 0.  Scale 0: No phenobarbital given.  Re-assess every 60 minutes as needed.   Scale 1-3: Phenobarbital 130 mg IV over 3 minutes. Re-assess every 60 minutes as needed.  May administer every 60 minutes to a maximum dose of phenobarbital 1040 mg in 24 hours!  Scale 4-8: Phenobarbital 260 mg IV over 5 minutes.  Re-assess every 60 minutes as needed. May administer every 60 minutes to a maximum dose of phenobarbital 1040mg in 24 hours!  Scale 9-10: Transfer to ICU (if not already in ICU).  Administer 10mg/kg phenobarbital IV over 60 minutes.  Maximum dose phenobarbital is 1040mg in 24 hours!

## 2025-06-12 NOTE — PROGRESS NOTES
ADDICTION SBIRT attempted; patient sleeping did not awake to verbal instructions. HENNA  to re-attempt at later time.

## 2025-06-12 NOTE — CARE COORDINATION
6/12/25, 2:03 PM EDT    DISCHARGE ON GOING EVALUATION    Elif Martínez       [facility] day: 2  Location: -10/010-A Reason for admit: Dehydration [E86.0]  Abdominal pain [R10.9]  Anxiety state [F41.1]  Difficulty walking [R26.2]  Involuntary muscle contractions [M62.40]  Alcohol-induced acute pancreatitis, unspecified complication status [K85.20]  Nausea and vomiting, unspecified vomiting type [R11.2]     Procedures:   6/12 MRCP: Ordered stat    Imaging since last note: None    Barriers to Discharge: Transferred from 8A to 3A for closer monitoring. Hospitalist, Neurology, Psych and GI following. Per GI, if MRCP shows that pt needs ERCP they recommend transfer to tertiary facility. IVF. Lactulose. Phenobarb prn. Zosyn iv q8hr. Nausea control. Rifaximin. PT/OT.     PCP: Chichi Salmeron APRN - CNP  Readmission Risk Score: 14.6    Patient Goals/Plan/Treatment Preferences: From home with  and MIL. She is connected to [facility] for alcohol counseling. Await therapy recommendations.

## 2025-06-12 NOTE — PROGRESS NOTES
2115-This Resource RN receives notification of RR called for this pt d/t concerns of acute DTs.     8947-4302-Mhnfk room to note primary RN, 2 other staff memebers attempting to calm pt and protect from harm. Pt is noted to exhibit mod-severe tremors of all 4 exts, shirt is already wet with sweat around neck, face is diaphoretic. Pt is able to verbalize at times, do note some statements to not correlate with situation. Primary RN Taryn is placing IV while techs hold arm down-pt is unable to control any of the tremors and apologizes once in the midst of some rambling statements. Has been incont of small amt of urine already. EKG is done and glucose is 140 at 2120 as well. Dr Sawyer to place Phenobarb loading dose to give STAT.     8106-7888-Kpounw to obtain any accurate BPs d/t severity of tremors, 4 staff now supporting pts extremities to prevent harm to self d/t flailing at bedrails, will be transferring pt. Note pt to have an episode of eyes rolling back, neck extends, jaw clenches and R cheek/eye are twitching. Lasts approx 20 secs. Incont of lg amt of urine at present.     2126-2130-Versed 4mg IV per order. Moderate relief of tremors approx 1 min afterwards, lasts for about 2 mins then moderate to severe tremors of all 4 exts returns. Pt looking up at ceiling and tells this RN to look out for the bugs, she's afraid they will fall in RN's hair and tries to reach to grab bugs away from my head.     8963-3081-Uv verbalizing but is strained, hair is soaked with sweat, shirt is saturated, skin is hot and diaphoretic, try to position pillow better beneath head and note neck is rigid then observe the eyes again roll back, facial distortion and then twitching-lasts again for approx 20 secs. 's during this.    2134-Versed 2mg IV. Phenobarbital still not on unit-mixing IVPB.    2136-2140-T axillary 99.2. Pt has another brief few min spell of much less tremors and is drowsy, states she is sleepy. This RN contacts

## 2025-06-12 NOTE — PROGRESS NOTES
Hospitalist Progress Note    Patient:  Elif Martínez      Unit/Bed:3A-10/010-A    YOB: 1980    MRN: [id number]       Acct: [id number]     PCP: Chichi Salmeron APRN - CNP    Date of Admission: 6/10/2025    Assessment/Plan:    Acute pancreatitis: Presented acute abdominal pain associated with nausea.  Lipase 150.  CT A/P peripancreatic edema with fullness of the head of pancreas suspicious for acute pancreatitis, 3 mm calcification of head of pancreas.  Prior history of pancreatitis.  Started on IVF, diet escalated to CLD.  Continue antiemetics and pain control.  US liver biliary ductal dilation may relate to cholecystectomy reservoir phenomenon versus possible choledocholithiasis given echogenic punctate focus in pancreatic head.  Will get MRCP.  Started on Zosyn for empiric antibiotic coverage.  GI consulted.  Alcoholic cirrhosis: With history of paraesophageal varices.  Follows outpatient with GI at [facility] and Dr. Sahu, gastroenterology.  On Coreg, spironolactone, lactulose and rifaximin.  Diuretics held secondary to aggressive hydration in setting of pancreatitis-see above.  INR 1.45, APTT 16.6.  Transaminitis hepatic anticholinergic pattern and mixed hyperbilirubinemia, AST:ALT >2 likely related to alcohol intake.  Alcohol abuse: Reportedly drinks 4-5 white claws daily, last drink 6/9/2025.  Denies prior history of withdrawal symptoms including autonomic dysfunction, tremors, hallucinations, seizures or DTs.  Presented tremors, likely essential involving BUE/BLE and progressively worsening.  Tremors dissipate with alcohol intake.  MRI negative.  Neurology evaluated, advised psychiatric consult.  PT/OT following. On CIWA protocol  Chronic hypotension: In setting of cirrhosis.  Continue midodrine 10 mg p.o. 3 times daily.  Monitor BP.  Hypothyroidism: Last TSH 2.93 6/2025.  Continue Synthroid 25 mcg p.o. daily  GERD: Continue metoprolol 40 mg p.o. twice daily  MDD/DUNG/BPD1: Continue  without Rales/Wheezes/Rhonchi.  Cardiovascular: Regular rate and rhythm with normal S1/S2 without murmurs, rubs or gallops.  Abdomen: Soft, non-tender, non-distended with normal bowel sounds.  Musculoskeletal: passive and active ROM x 4 extremities.  Skin: Skin color, texture, turgor normal.  No rashes or lesions.  Neurologic:  Neurovascularly intact without any focal sensory/motor deficits. Cranial nerves: II-XII intact, grossly non-focal. BUE tremor with action but not rest, minimal BLE tremor  Psychiatric: Alert and oriented, thought content appropriate, normal insight  Capillary Refill: Brisk,< 3 seconds   Peripheral Pulses: +2 palpable, equal bilaterally       Labs:   Recent Labs     06/10/25  1219 06/11/25  0630 06/12/25  0640   WBC 3.2* 3.6* 3.1*   HGB 16.9* 12.7 12.3   HCT 47.3* 36.9* 37.6   PLT 81* 74* 73*     Recent Labs     06/10/25  1219 06/11/25  0630 06/11/25 2128 06/12/25  0640   * 139 132* 138   K 3.5 3.5 3.3* 3.1*   CL 94* 102 97* 100   CO2 21* 21* 15* 21*   BUN <2* 6* 4* 3*   CREATININE 0.7 0.7 0.7 0.6   CALCIUM 8.6 8.9 8.4* 8.9   PHOS 2.2*  --  2.5  --      Recent Labs     06/10/25  1219 06/11/25  0630   * 343*   * 128*   BILIDIR 1.1* 1.2*   BILITOT 1.9* 1.9*   ALKPHOS 181* 163*     Recent Labs     06/11/25  0630   INR 1.45*     No results for input(s): \"CKTOTAL\", \"TROPONINI\" in the last 72 hours.    Microbiology:      Urinalysis:      Lab Results   Component Value Date/Time    NITRU NEGATIVE 06/10/2024 06:35 PM    WBCUA NONE 06/10/2024 06:35 PM    BACTERIA FEW 06/10/2024 06:35 PM    RBCUA 0-2 06/10/2024 06:35 PM    RBCUA OBSCURED 02/04/2019 09:50 PM    BLOODU NEGATIVE 06/10/2024 06:35 PM    GLUCOSEU NEGATIVE 06/10/2024 06:35 PM       Radiology:  US LIVER   Final Result   Biliary ductal dilatation may be secondary to cholecystectomy reservoir    phenomenon, however this is not entirely definitive and there is a    punctate echogenic focus in the pancreatic head which could    within the pancreatic duct (series 301, image 33). The gallbladder is absent. No   biliary ductal dilatation is identified. Hepatomegaly and hepatic steatosis are   observed. Correlate with liver function tests and pancreatic enzymes. Close   clinical follow-up and follow-up to ensure resolution is advised.      2. Paraesophageal and gastric varices appear unchanged. Varices along the   anterior abdominal wall at the level of the umbilicus appears stable. No bowel   obstruction, fluid collection, or free air is observed.      3. The urinary bladder is under distended and not well assessed. Correlate with   urinalysis. No renal calculus or obstructive uropathy is visualized. Chronic   findings are discussed.            **This report has been created using voice recognition software.  It may contain   minor errors which are inherent in voice recognition technology.**      Electronically signed by Dr. Krupa NEGRETE CHEST (2 VW)   Final Result   1. Normal heart size. Old healed granulomatous disease. Mild pectus excavatum.   2. No acute findings. No infiltrates or effusions are seen.            **This report has been created using voice recognition software.  It may contain   minor errors which are inherent in voice recognition technology.**            Electronically signed by Dr. Tremayne Goodrich          DVT prophylaxis: [x] Lovenox                                 [] SCDs                                 [] SQ Heparin                                 [] Encourage ambulation           [] Already on Anticoagulation     Code Status: Full Code    PT/OT Eval Status: PT/OT following    Tele:   [x] yes             [] no    Electronically signed by Jorge Luis Etienne DO on 6/12/2025 at 8:18 AM
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
## 2025-06-12 NOTE — PROGRESS NOTES
[facility]  PHYSICAL THERAPY MISSED TREATMENT NOTE  STRZ CCU 3A    Date: 2025  Patient Name: Elif Martínez        MRN: [id number]   : 1980  (45 y.o.)  Gender: female                REASON FOR MISSED TREATMENT:  Hold treatment per nursing request.  RN reports pt too lethargic to participate and recommended to check back tomorrow.    Jana Crawford, MPT [id number]

## 2025-06-13 PROBLEM — E44.0 MODERATE MALNUTRITION: Status: ACTIVE | Noted: 2019-01-18

## 2025-06-13 PROBLEM — E44.0 MODERATE MALNUTRITION: Status: ACTIVE | Noted: 2025-06-13

## 2025-06-13 PROBLEM — F31.4 BIPOLAR DISORDER WITH SEVERE DEPRESSION (HCC): Status: ACTIVE | Noted: 2025-06-13

## 2025-06-13 LAB
ALBUMIN SERPL BCG-MCNC: 3.5 G/DL (ref 3.4–4.9)
ALP SERPL-CCNC: 141 U/L (ref 38–126)
ALT SERPL W/O P-5'-P-CCNC: 79 U/L (ref 10–35)
ANION GAP SERPL CALC-SCNC: 16 MEQ/L (ref 8–16)
AST SERPL-CCNC: 117 U/L (ref 10–35)
BILIRUB CONJ SERPL-MCNC: 1.1 MG/DL (ref 0–0.2)
BILIRUB SERPL-MCNC: 1.7 MG/DL (ref 0.3–1.2)
BUN SERPL-MCNC: 3 MG/DL (ref 8–23)
CALCIUM SERPL-MCNC: 8.8 MG/DL (ref 8.6–10)
CHLORIDE SERPL-SCNC: 96 MEQ/L (ref 98–111)
CO2 SERPL-SCNC: 24 MEQ/L (ref 22–29)
CREAT SERPL-MCNC: 0.6 MG/DL (ref 0.5–0.9)
GFR SERPL CREATININE-BSD FRML MDRD: > 90 ML/MIN/1.73M2
GLUCOSE SERPL-MCNC: 105 MG/DL (ref 74–109)
INR PPP: 1.39 (ref 0.85–1.13)
POTASSIUM SERPL-SCNC: 3.4 MEQ/L (ref 3.5–5.2)
PROT SERPL-MCNC: 6.3 G/DL (ref 6.4–8.3)
PROTHROMBIN TIME: 15.9 SECONDS (ref 10–13.5)
SODIUM SERPL-SCNC: 136 MEQ/L (ref 135–145)

## 2025-06-13 PROCEDURE — 2580000003 HC RX 258: Performed by: STUDENT IN AN ORGANIZED HEALTH CARE EDUCATION/TRAINING PROGRAM

## 2025-06-13 PROCEDURE — 82248 BILIRUBIN DIRECT: CPT

## 2025-06-13 PROCEDURE — 80053 COMPREHEN METABOLIC PANEL: CPT

## 2025-06-13 PROCEDURE — 2500000003 HC RX 250 WO HCPCS

## 2025-06-13 PROCEDURE — 6370000000 HC RX 637 (ALT 250 FOR IP)

## 2025-06-13 PROCEDURE — 36415 COLL VENOUS BLD VENIPUNCTURE: CPT

## 2025-06-13 PROCEDURE — 6360000002 HC RX W HCPCS: Performed by: STUDENT IN AN ORGANIZED HEALTH CARE EDUCATION/TRAINING PROGRAM

## 2025-06-13 PROCEDURE — 2140000000 HC CCU INTERMEDIATE R&B

## 2025-06-13 PROCEDURE — 99233 SBSQ HOSP IP/OBS HIGH 50: CPT | Performed by: STUDENT IN AN ORGANIZED HEALTH CARE EDUCATION/TRAINING PROGRAM

## 2025-06-13 PROCEDURE — 90792 PSYCH DIAG EVAL W/MED SRVCS: CPT | Performed by: PHYSICIAN ASSISTANT

## 2025-06-13 PROCEDURE — 6360000002 HC RX W HCPCS

## 2025-06-13 PROCEDURE — 85610 PROTHROMBIN TIME: CPT

## 2025-06-13 PROCEDURE — 97535 SELF CARE MNGMENT TRAINING: CPT

## 2025-06-13 PROCEDURE — 6370000000 HC RX 637 (ALT 250 FOR IP): Performed by: PHYSICIAN ASSISTANT

## 2025-06-13 RX ORDER — TRAZODONE HYDROCHLORIDE 50 MG/1
50 TABLET ORAL NIGHTLY
Status: DISCONTINUED | OUTPATIENT
Start: 2025-06-13 | End: 2025-06-16 | Stop reason: HOSPADM

## 2025-06-13 RX ADMIN — SODIUM CHLORIDE, SODIUM LACTATE, POTASSIUM CHLORIDE, AND CALCIUM CHLORIDE: .6; .31; .03; .02 INJECTION, SOLUTION INTRAVENOUS at 01:38

## 2025-06-13 RX ADMIN — PIPERACILLIN AND TAZOBACTAM 3375 MG: 3; .375 INJECTION, POWDER, FOR SOLUTION INTRAVENOUS at 04:00

## 2025-06-13 RX ADMIN — LACTULOSE 10 G: 20 SOLUTION ORAL at 09:44

## 2025-06-13 RX ADMIN — PROCHLORPERAZINE EDISYLATE 10 MG: 5 INJECTION INTRAMUSCULAR; INTRAVENOUS at 09:45

## 2025-06-13 RX ADMIN — VENLAFAXINE HYDROCHLORIDE 50 MG: 50 TABLET ORAL at 09:43

## 2025-06-13 RX ADMIN — LAMOTRIGINE 50 MG: 25 TABLET ORAL at 09:44

## 2025-06-13 RX ADMIN — Medication 100 MG: at 09:44

## 2025-06-13 RX ADMIN — Medication 500 MG: at 20:40

## 2025-06-13 RX ADMIN — BUSPIRONE HYDROCHLORIDE 15 MG: 7.5 TABLET ORAL at 20:37

## 2025-06-13 RX ADMIN — PROCHLORPERAZINE EDISYLATE 10 MG: 5 INJECTION INTRAMUSCULAR; INTRAVENOUS at 13:04

## 2025-06-13 RX ADMIN — SODIUM CHLORIDE, SODIUM LACTATE, POTASSIUM CHLORIDE, AND CALCIUM CHLORIDE: .6; .31; .03; .02 INJECTION, SOLUTION INTRAVENOUS at 12:00

## 2025-06-13 RX ADMIN — DICYCLOMINE HYDROCHLORIDE 20 MG: 20 TABLET ORAL at 18:15

## 2025-06-13 RX ADMIN — DICYCLOMINE HYDROCHLORIDE 20 MG: 20 TABLET ORAL at 13:04

## 2025-06-13 RX ADMIN — SODIUM CHLORIDE, PRESERVATIVE FREE 20 ML: 5 INJECTION INTRAVENOUS at 09:46

## 2025-06-13 RX ADMIN — LACTULOSE 10 G: 20 SOLUTION ORAL at 14:20

## 2025-06-13 RX ADMIN — TRAZODONE HYDROCHLORIDE 50 MG: 50 TABLET ORAL at 20:36

## 2025-06-13 RX ADMIN — ENOXAPARIN SODIUM 40 MG: 100 INJECTION SUBCUTANEOUS at 09:44

## 2025-06-13 RX ADMIN — RIFAXIMIN 550 MG: 550 TABLET ORAL at 11:47

## 2025-06-13 RX ADMIN — RIFAXIMIN 550 MG: 550 TABLET ORAL at 20:37

## 2025-06-13 RX ADMIN — DICYCLOMINE HYDROCHLORIDE 20 MG: 20 TABLET ORAL at 20:37

## 2025-06-13 RX ADMIN — PIPERACILLIN AND TAZOBACTAM 3375 MG: 3; .375 INJECTION, POWDER, FOR SOLUTION INTRAVENOUS at 18:21

## 2025-06-13 RX ADMIN — BUSPIRONE HYDROCHLORIDE 15 MG: 7.5 TABLET ORAL at 14:20

## 2025-06-13 RX ADMIN — POTASSIUM CHLORIDE 40 MEQ: 1500 TABLET, EXTENDED RELEASE ORAL at 11:53

## 2025-06-13 RX ADMIN — Medication 500 MG: at 09:44

## 2025-06-13 RX ADMIN — DICYCLOMINE HYDROCHLORIDE 20 MG: 20 TABLET ORAL at 09:44

## 2025-06-13 RX ADMIN — Medication 1 TABLET: at 09:44

## 2025-06-13 RX ADMIN — VENLAFAXINE HYDROCHLORIDE 25 MG: 50 TABLET ORAL at 11:47

## 2025-06-13 RX ADMIN — VENLAFAXINE HYDROCHLORIDE 50 MG: 50 TABLET ORAL at 18:15

## 2025-06-13 RX ADMIN — PANTOPRAZOLE SODIUM 40 MG: 40 TABLET, DELAYED RELEASE ORAL at 18:15

## 2025-06-13 RX ADMIN — PIPERACILLIN AND TAZOBACTAM 3375 MG: 3; .375 INJECTION, POWDER, FOR SOLUTION INTRAVENOUS at 10:47

## 2025-06-13 RX ADMIN — PANTOPRAZOLE SODIUM 40 MG: 40 TABLET, DELAYED RELEASE ORAL at 09:44

## 2025-06-13 RX ADMIN — LAMOTRIGINE 50 MG: 25 TABLET ORAL at 20:37

## 2025-06-13 RX ADMIN — BUSPIRONE HYDROCHLORIDE 15 MG: 7.5 TABLET ORAL at 09:44

## 2025-06-13 RX ADMIN — LACTULOSE 10 G: 20 SOLUTION ORAL at 20:36

## 2025-06-13 RX ADMIN — PROCHLORPERAZINE EDISYLATE 10 MG: 5 INJECTION INTRAMUSCULAR; INTRAVENOUS at 18:17

## 2025-06-13 NOTE — PROGRESS NOTES
Utilize [facility] alcohol withdrawal scale (Based on [place] Modified Alcohol Withdrawal Scale).  Tabulate score based on classifications including Tremor, Sweating, Hallucination, Orientation, and Agitation.    Tremor: 0  Sweatin  Hallucinations: 0  Orientation: 0  Agitation: 0  Total Score: 0  Action perform as described below     Tremor:  No tremor is 0 points.  Tremor on movement is 1 point.  Tremor at rest is 2 points.  Sweating: No Sweat 0 points. Moist is 1 point.  Drenching sweats is 2 points.  Hallucinations: No present 0 points. Dissuadable is 1 point. Not dissuadable is 2 points.  Orientation: Oriented 0 points. Vague/detached 1 point. Disoriented/no contact 2 points.  Agitation: Calm 0 points.  Anxious 1 point. Panicky 2 points.    Check scale every 2 hours.  Discontinue scoring with 4 consecutive scorings of 0.  Scale 0: No phenobarbital given.  Re-assess every 60 minutes as needed.   Scale 1-3: Phenobarbital 130 mg IV over 3 minutes. Re-assess every 60 minutes as needed.  May administer every 60 minutes to a maximum dose of phenobarbital 1040 mg in 24 hours!  Scale 4-8: Phenobarbital 260 mg IV over 5 minutes.  Re-assess every 60 minutes as needed. May administer every 60 minutes to a maximum dose of phenobarbital 1040mg in 24 hours!  Scale 9-10: Transfer to ICU (if not already in ICU).  Administer 10mg/kg phenobarbital IV over 60 minutes.  Maximum dose phenobarbital is 1040mg in 24 hours!

## 2025-06-13 NOTE — PROGRESS NOTES
2115- Rapid Response  2117- PA, resource nurse, Respiratory, charge nurse, other staff members at bedside  2118-house sup  Phenobarb ordered  2120-EKG,blood sugar obtained  2122- discussion to transfer pt off unit  2123-recheck vitals  2126- 4mg versed administered  2130- transfer orders?  2132-Transfer orders with room number  2134- 2mg Versed administered  2135- temp obtained  2141-recheck vitals  2147- placed on monitor  [id number]-wzwskl recheck  2200-phenobarb drip started  2204- pt transported with resource nurse, other staff.

## 2025-06-13 NOTE — PROGRESS NOTES
ADDICTION SBIRT attempted patient sleeping did not awake to verbal instructions. HENNA  to re-attempt at later time.

## 2025-06-13 NOTE — PROGRESS NOTES
[facility]  STRZ CCU 3A  Occupational Therapy  Daily Note    Discharge Recommendations: Inpatient Rehabilitation and Patient would benefit from continued OT at discharge  Equipment Recommendations: Yes Grabbars in shower      Time In: 1344  Time Out: 1410  Timed Code Treatment Minutes: 26 Minutes  Minutes: 26          Date: 2025  Patient Name: Elif Martínez,   Gender: female      Room: [address] [place] [address]  MRN: [id number]  : 1980  (45 y.o.)  Referring Practitioner: Dr. FRANCI Saldana MD  Diagnosis: Abdominal pain  Additional Pertinent Hx: Pt with PMHx of alcoholic cirrhosis, alcohol abuse, hypothyroidism who presents to [facility] with nausea vomiting abdominal pain and new tremor.  Patient reports nausea vomiting abdominal pain over the past 24 hours, history of alcoholic cirrhosis reports that she recently started drinking again secondary to social stressors.  Last drink was yesterday reports that she had 4-5 white claws.  On presentation to the hospital states that she had intractable nausea vomiting and abdominal pain.  CT abdomen pelvis positive for pancreatitis.     Reports new onset of tremor over the past 1 to 2 weeks, bilateral upper and lower extremities states that she had a fall and after which started developing tremors which have worsened over the past 2 weeks now states that she is unable to walk.  Patient reports that when she drinks alcohol the tremors disappear.    Restrictions/Precautions:  Restrictions/Precautions: Fall Risk, Isolation     Social/Functional History:  Lives With: Spouse  Type of Home: House  Home Layout: Multi-level, Laundry in basement  Home Access: Stairs to enter with rails  Entrance Stairs - Number of Steps: 3 steps with a handrail to enter but 3 steps + 7 steps to go to main level and another flight to get upstairs.  Pt has rails on all but the steps going into basement.  Home Equipment: Walker - Rolling   Bathroom Shower/Tub: Tub/Shower unit,  Contact Guard Assistance.      FUNCTIONAL MOBILITY:  Assistive Device: None  Assist Level:  Contact Guard Assistance.   Distance: Within room (3 feet to chair)        Functional Outcome Measures:         Modified [place]:  Current Functional Status:  Not Applicable    Education:  Learners: Patient  ADL's, Importance of Increasing Activity, Fall Prevention, OT POC, and Role of OT    ASSESSMENT:     Activity Tolerance:  Patient tolerance of  treatment: Fair treatment tolerance, Limited by fatigue, Reduced activity pace, and Need for increased rest breaks      Plan: Times Per Week: 5x  Specific Instructions for Next Treatment: Functional mobility; ADLs and standing balance; fine motor tasks; HEP with a moderate to high resistance stretch band  Current Treatment Recommendations: Balance training, Strengthening, Functional mobility training, Endurance training, Self-Care / ADL, Safety education & training, Home management training  Additional Comments: Pt would benefit from continued skilled OT services when medically stable and discharged from Acute.  HHOT recommended.    Goals  Short Term Goals  Time Frame for Short Term Goals: By discharge  Short Term Goal 1: Pt will complete BUE ROM/moderate resistance exercises while following any handout needed to increase her strength and endurance for ease of doing ADLs and functional mobility.  Short Term Goal 2: Pt will complete toileting routine including transfers to/from the commode or standard toilet seat with a grabbar with SBA to increase her independence with self care.  Short Term Goal 3: Pt will complete dynamic standing tasks while using 1 hand support with SBA to increase her independence with self care and doing any light homemaking.  Short Term Goal 4: Pt will demonstrate functional mobility walking to/from the bathroom with a rolling walker with SBA to prepare for doing her self care at the sink.  Short Term Goal 5: Pt will increase her hand coordination while

## 2025-06-13 NOTE — PROGRESS NOTES
Utilize [facility] alcohol withdrawal scale (Based on [employer] Modified Alcohol Withdrawal Scale).  Tabulate score based on classifications including Tremor, Sweating, Hallucination, Orientation, and Agitation.    Tremor: 1  Sweatin  Hallucinations: 0  Orientation: 0  Agitation: 0  Total Score: 1    Action perform as described below     Tremor:  No tremor is 0 points.  Tremor on movement is 1 point.  Tremor at rest is 2 points.  Sweating: No Sweat 0 points. Moist is 1 point.  Drenching sweats is 2 points.  Hallucinations: No present 0 points. Dissuadable is 1 point. Not dissuadable is 2 points.  Orientation: Oriented 0 points. Vague/detached 1 point. Disoriented/no contact 2 points.  Agitation: Calm 0 points.  Anxious 1 point. Panicky 2 points.    Check scale every 2 hours.  Discontinue scoring with 4 consecutive scorings of 0.  Scale 0: No phenobarbital given.  Re-assess every 60 minutes as needed.   Scale 1-3: Phenobarbital 130 mg IV over 3 minutes. Re-assess every 60 minutes as needed.  May administer every 60 minutes to a maximum dose of phenobarbital 1040 mg in 24 hours!  Scale 4-8: Phenobarbital 260 mg IV over 5 minutes.  Re-assess every 60 minutes as needed. May administer every 60 minutes to a maximum dose of phenobarbital 1040mg in 24 hours!  Scale 9-10: Transfer to ICU (if not already in ICU).  Administer 10mg/kg phenobarbital IV over 60 minutes.  Maximum dose phenobarbital is 1040mg in 24 hours!

## 2025-06-13 NOTE — PROGRESS NOTES
Gastroenterology Progress Note:     Patient Name:  Elif Martínez   MRN: [id number]  [id number]  YOB: 1980  Admit Date: 6/10/2025 11:11 AM  Primary Care Physician: Chichi Salmeron, APRN - CNP   3A-10/010-A     Patient seen and examined.  24 hours events and chart reviewed.    Subjective: MRCP completed yesterday- shows dilated CBD 2cm, no definitive stone.  Recommend transfer to [facility] for evaluation as patient likely needs ERCP to evaluate cause of dilation and has a history of [place] Y gastric bypass.      Objective:  /80   Pulse 89   Temp 98.1 °F (36.7 °C) (Oral)   Resp 19   Wt 69.5 kg (153 lb 4.8 oz)   SpO2 97%   BMI 25.51 kg/m²     Physical Exam  Vitals and nursing note reviewed.   Constitutional:       General: She is not in acute distress.     Appearance: She is normal weight. She is ill-appearing.   HENT:      Mouth/Throat:      Mouth: Mucous membranes are moist.      Pharynx: Oropharynx is clear.   Eyes:      General: No scleral icterus.     Pupils: Pupils are equal, round, and reactive to light.   Cardiovascular:      Rate and Rhythm: Normal rate and regular rhythm.      Heart sounds: Normal heart sounds. No murmur heard.  Pulmonary:      Effort: Pulmonary effort is normal.      Breath sounds: Normal breath sounds.   Abdominal:      General: Abdomen is flat. Bowel sounds are normal. There is no distension.      Palpations: Abdomen is soft.      Tenderness: There is abdominal tenderness (Generalized).   Skin:     General: Skin is warm and dry.      Capillary Refill: Capillary refill takes less than 2 seconds.   Neurological:      Mental Status: She is alert. Mental status is at baseline.      Comments: Generalized tremors             Labs:   CBC:   Lab Results   Component Value Date/Time    WBC 3.1 06/12/2025 06:40 AM    HGB 12.3 06/12/2025 06:40 AM    HGB 15.0 08/31/2011 01:55 PM    HCT 37.6 06/12/2025 06:40 AM    MCV 98.2 06/12/2025 06:40 AM    PLT 73 06/12/2025 06:40 AM     BMP:

## 2025-06-13 NOTE — PLAN OF CARE
Problem: Discharge Planning  Goal: Discharge to home or other facility with appropriate resources  Outcome: Progressing  Flowsheets (Taken 6/13/2025 0907)  Discharge to home or other facility with appropriate resources: Identify barriers to discharge with patient and caregiver   Possible transfer to [facility] pending insurance approval    Problem: Seizure Precautions  Goal: Remains free of injury related to seizures activity  Outcome: Progressing  Maintaing seizure precautions     Problem: Skin/Tissue Integrity  Goal: Skin integrity remains intact  Description: 1.  Monitor for areas of redness and/or skin breakdown2.  Assess vascular access sites hourly3.  Every 4-6 hours minimum:  Change oxygen saturation probe site4.  Every 4-6 hours:  If on nasal continuous positive airway pressure, respiratory therapy assess nares and determine need for appliance change or resting period  Outcome: Progressing  Flowsheets  Taken 6/13/2025 1105  Skin Integrity Remains Intact:   Monitor for areas of redness and/or skin breakdown   Turn and reposition as indicated   Monitor skin under medical devices   Check visual cues for pain  Taken 6/13/2025 0907  Skin Integrity Remains Intact: Monitor for areas of redness and/or skin breakdown     Problem: ABCDS Injury Assessment  Goal: Absence of physical injury  Outcome: Progressing     Problem: Safety - Adult  Goal: Free from fall injury  Outcome: Progressing   Patient up to chair with assist, working with PT/OT    Problem: Nutrition Deficit:  Goal: Optimize nutritional status  Outcome: Progressing  Diet order increased to clear liquid today, pt tolerating with no nausea      Problem: Neurosensory - Adult  Goal: Achieves stable or improved neurological status  Outcome: Progressing  Pt is AxO x4 and responds to commands, drowsy at times     Problem: Neurosensory - Adult  Goal: Absence of seizures  Outcome: Progressing     Problem: Neurosensory - Adult  Goal: Achieves maximal functionality and  self care  Outcome: Progressing     Problem: Gastrointestinal - Adult  Goal: Minimal or absence of nausea and vomiting  Outcome: Progressing     Problem: Gastrointestinal - Adult  Goal: Maintains or returns to baseline bowel function  Outcome: Progressing     Problem: Gastrointestinal - Adult  Goal: Maintains adequate nutritional intake  Outcome: Progressing     Problem: Gastrointestinal - Adult  Goal: Establish and maintain optimal ostomy function  Outcome: Progressing     Problem: Infection - Adult  Goal: Absence of infection at discharge  Outcome: Progressing  Zosyn given for possible pancreatitis, afebrile this shift     Problem: Infection - Adult  Goal: Absence of infection during hospitalization  Outcome: Progressing     Problem: Infection - Adult  Goal: Absence of fever/infection during anticipated neutropenic period  Outcome: Progressing  Care plan reviewed with patient.  Patient verbalizes understanding of the care plan and contributed to goal setting.

## 2025-06-13 NOTE — PROGRESS NOTES
Hospitalist Progress Note    Patient:  Elif Martínez      Unit/Bed:3A-10/010-A    YOB: 1980    MRN: [id number]       Acct: [id number]     PCP: Chichi Salmeron APRN - CNP    Date of Admission: 6/10/2025    Assessment/Plan:    Acute pancreatitis: Presented acute abdominal pain associated with nausea.  Lipase 150.  CT A/P peripancreatic edema with fullness of the head of pancreas suspicious for acute pancreatitis, 3 mm calcification of head of pancreas.  Prior history of pancreatitis.  Started on IVF, diet escalated to CLD.  Continue antiemetics and pain control.  US liver biliary ductal dilation may relate to cholecystectomy reservoir phenomenon versus possible choledocholithiasis given echogenic punctate focus in pancreatic head. Started on Zosyn for empiric antibiotic coverage.  GI consulted. MRCP with 2 cm CBD dilation, marked hepatic steatosis and trace ascites 6/12. GI advised transfer can not definitively rule out stone and history of RYGB for ERCP.   Alcoholic cirrhosis: With history of paraesophageal varices.  Follows outpatient with GI at [facility] and Dr. Sahu, gastroenterology.  On Coreg, spironolactone, lactulose and rifaximin.  Diuretics held secondary to aggressive hydration in setting of pancreatitis-see above.  INR 1.45, APTT 16.6.  Transaminitis hepatic anticholinergic pattern and mixed hyperbilirubinemia, AST:ALT >2 likely related to alcohol intake.  Alcohol abuse: Reportedly drinks 4-5 white claws daily, last drink 6/9/2025.  Denies prior history of withdrawal symptoms including autonomic dysfunction, tremors, hallucinations, seizures or DTs.  Presented tremors, likely essential involving BUE/BLE and progressively worsening.  Tremors dissipate with alcohol intake.  MRI negative.  Neurology evaluated, advised psychiatric consult.  PT/OT following. On CIWA protocol  Chronic hypotension: In setting of cirrhosis.  Continue midodrine 10 mg p.o. 3 times daily.  Monitor  contain   minor errors which are inherent in voice recognition technology.**            Electronically signed by Dr. Lisa Palmer      MRI THORACIC SPINE WO CONTRAST   Final Result      1. Normal signal in the thoracic spinal cord.   2. Focal disc protrusion at the T7-8 level. This does not contribute to spinal   canal stenosis.               **This report has been created using voice recognition software. It may contain   minor errors which are inherent in voice recognition technology.**      Electronically signed by Dr. Lisa Palmer      CT ABDOMEN PELVIS W IV CONTRAST Additional Contrast? None   Final Result   1. Peripancreatic edema with fullness at the head of the pancreas may indicate   acute pancreatitis. A 3 mm calcification at the head of the pancreas may be   within the pancreatic duct (series 301, image 33). The gallbladder is absent. No   biliary ductal dilatation is identified. Hepatomegaly and hepatic steatosis are   observed. Correlate with liver function tests and pancreatic enzymes. Close   clinical follow-up and follow-up to ensure resolution is advised.      2. Paraesophageal and gastric varices appear unchanged. Varices along the   anterior abdominal wall at the level of the umbilicus appears stable. No bowel   obstruction, fluid collection, or free air is observed.      3. The urinary bladder is under distended and not well assessed. Correlate with   urinalysis. No renal calculus or obstructive uropathy is visualized. Chronic   findings are discussed.            **This report has been created using voice recognition software.  It may contain   minor errors which are inherent in voice recognition technology.**      Electronically signed by Dr. Krupa Vegas      XR CHEST (2 VW)   Final Result   1. Normal heart size. Old healed granulomatous disease. Mild pectus excavatum.   2. No acute findings. No infiltrates or effusions are seen.            **This report has been created using voice

## 2025-06-13 NOTE — PROGRESS NOTES
Spiritual Health History and Assessment/Progress Note  [facility]    Attempted Encounter,  ,  ,      Name: Elif Martínez MRN: [id number]    Age: 45 y.o.     Sex: female   Language: English   Sabianism: Adventism   Abdominal pain     Date: 6/13/2025            Total Time Calculated: 5 min              Spiritual Assessment continued in [facility] CCU 3A        Referral/Consult From: Rounding   Encounter Overview/Reason: Attempted Encounter  Service Provided For: Patient    Mackenzie, Belief, Meaning:   Patient unable to assess at this time  Family/Friends No family/friends present      Importance and Influence:  Patient unable to assess at this time  Family/Friends No family/friends present    Community:  Patient feels well-supported. Support system includes: Spouse/Partner  Family/Friends feel well-supported. Support system includes: Spouse/Partner    Assessment and Plan of Care:   During my encounter with the 45 yr old patient, I attempted to visit with the pt on 3A. The patient appears to be resting (not responsive/resting) now and I didn't want to disturb the patient. I or another  will attempt to visit the patient or the family at another time. The pt was admitted due to abdominal pain.     Patient Interventions include: Other: prayer  Family/Friends Interventions include: No family/friends present    Patient Plan of Care: Spiritual Care available upon further referral  Family/Friends Plan of Care: Spiritual Care available upon further referral    Electronically signed by NATASHA Amaro on 6/13/2025 at 10:31 AM

## 2025-06-13 NOTE — CONSULTS
Department of Psychiatry  Consult Service   Psychiatric Assessment      Thank you very much for allowing us to participate in the care of this patient.      Reason for Consult:   concern for MDD    HISTORY OF PRESENT ILLNESS:          The patient is a 45 y.o. female with significant history of  anxiety, depression, alcoholic cirrhosis, alcohol abuse, hypothyroidism  who is admitted medically due to tremoring and acute abdominal pain, nausea, vomiting secondary to acute pancreatitis.   She reportedly drinks 5-6 white claws per day and was started on phenobarbitol PRN for alcohol withdrawal.     Psychiatry was consulted due to concern for MDD, other mood disorder, in the setting of alcohol abuse     Elif was seen laying in bed. She was awake but was somnolent and lethargic. She would frequently fall asleep during the interview. Interview was limited secondary to this.   Elif reported that she was \"tired\" today. She is concerned that her medications are making her too tired.  This writer then asked her about her depression.  She stated that she has been feeling depressed because \"my mother.\" When asked further about this, she said \"she is a narcissist.\"  She then reported that her mother has been living with her and her  since last July. She did not elaborate further on this.   She denied any recent suicide ideation.  She minimized her alcohol use. She stated that she only drinks a few drinks per day but it does not get to the point of alcohol withdrawal. However, she previously reported that she would drink to stop the tremoring.   She did report that she has been sleeping and eating well    PSYCHIATRIC HISTORY:      Outpatient psychiatric [facility]  [place]   Suicide attempts: 25 lifetime attempts per patient   Inpatient psychiatric admissions: 25+ admissions in the past per patient     Past psychiatric medications includes:  contain minor errors which are inherent in voice recognition technology.**

## 2025-06-13 NOTE — PROGRESS NOTES
Comprehensive Nutrition Assessment    Type and Reason for Visit:  Initial, Positive nutrition screen (Weight Loss and Decreased Appetite)    Nutrition Recommendations/Plan:   Started on clear liquids per GI - GI signed off and recommending transfer to [facility] for ERCP as MRCP 6/12 showing dilated CBD and no stone.   Start ONS: Ensure Clear (TID) - monitor tolerance with hx/o gastric bypass  Recommend modifying to chewable bariatric MVI for life long use  Recommend to continue thiamine and recommend folic acid supplementation  If unable to tolerate CLD or diet advancements throughout weekend, consideration for PPN initiation - dosing weight of 60 kg, 10 kcals/kg, 1.1 grams/kg of amino acids, 20% lipid kcals.     Malnutrition Assessment:  Malnutrition Status:  Moderate malnutrition (06/13/25 1225)    Context:  Acute Illness     Findings of the 6 clinical characteristics of malnutrition:  Energy Intake:  50% or less of estimated energy requirements for 5 or more days (minimal intake x 2 weeks; ETOH abuse; N/V PTA)  Weight Loss:  Unable to assess (limited wt hx)     Body Fat Loss:  No body fat loss     Muscle Mass Loss:  Mild muscle mass loss Clavicles (pectoralis & deltoids), Scapula (trapezius), Calf (gastrocnemius), Thigh (quadriceps)  Fluid Accumulation:  Unable to assess     Strength:  Not Performed    Nutrition Assessment:     Pt. moderately malnourished AEB criteria as listed above.  At risk for further nutrition compromise r/t acute pancreatitis, ETOH abuse - 4-5 whites claws daily, and underlying medical condition (PMHx: bipolar disorder, pancreatitis, GERD, hypothyroidism, ETOH cirrhosis, esophageal varices, arnie en y gastric bypass 2008, cholecystectomy).        Nutrition Related Findings:  Pt. Report/Treatments/Miscellaneous: Pt seen with . Pt lethargic and fell asleep a few times throughout conversation. Pt endorses poor PO intake PTA - N/V and wasn't able to eat or drink. Per EMR pt started

## 2025-06-14 LAB
ALBUMIN SERPL BCG-MCNC: 3.2 G/DL (ref 3.4–4.9)
ALP SERPL-CCNC: 125 U/L (ref 38–126)
ALT SERPL W/O P-5'-P-CCNC: 61 U/L (ref 10–35)
ANION GAP SERPL CALC-SCNC: 17 MEQ/L (ref 8–16)
AST SERPL-CCNC: 73 U/L (ref 10–35)
BACTERIA BLD AEROBE CULT: NORMAL
BACTERIA BLD AEROBE CULT: NORMAL
BILIRUB CONJ SERPL-MCNC: 0.9 MG/DL (ref 0–0.2)
BILIRUB SERPL-MCNC: 1.3 MG/DL (ref 0.3–1.2)
BUN SERPL-MCNC: 3 MG/DL (ref 8–23)
CALCIUM SERPL-MCNC: 9.1 MG/DL (ref 8.6–10)
CHLORIDE SERPL-SCNC: 101 MEQ/L (ref 98–111)
CO2 SERPL-SCNC: 21 MEQ/L (ref 22–29)
CREAT SERPL-MCNC: 0.6 MG/DL (ref 0.5–0.9)
DEPRECATED RDW RBC AUTO: 45.4 FL (ref 35–45)
ERYTHROCYTE [DISTWIDTH] IN BLOOD BY AUTOMATED COUNT: 12.4 % (ref 11.5–14.5)
GFR SERPL CREATININE-BSD FRML MDRD: > 90 ML/MIN/1.73M2
GLUCOSE SERPL-MCNC: 115 MG/DL (ref 74–109)
HCT VFR BLD AUTO: 38.1 % (ref 37–47)
HGB BLD-MCNC: 12.3 GM/DL (ref 12–16)
MAGNESIUM SERPL-MCNC: 2.2 MG/DL (ref 1.6–2.6)
MCH RBC QN AUTO: 32.5 PG (ref 26–33)
MCHC RBC AUTO-ENTMCNC: 32.3 GM/DL (ref 32.2–35.5)
MCV RBC AUTO: 100.5 FL (ref 81–99)
PLATELET # BLD AUTO: 100 THOU/MM3 (ref 130–400)
PMV BLD AUTO: 10.6 FL (ref 9.4–12.4)
POTASSIUM SERPL-SCNC: 3.5 MEQ/L (ref 3.5–5.2)
POTASSIUM SERPL-SCNC: 3.5 MEQ/L (ref 3.5–5.2)
PROT SERPL-MCNC: 5.8 G/DL (ref 6.4–8.3)
RBC # BLD AUTO: 3.79 MILL/MM3 (ref 4.2–5.4)
SODIUM SERPL-SCNC: 139 MEQ/L (ref 135–145)
WBC # BLD AUTO: 3 THOU/MM3 (ref 4.8–10.8)

## 2025-06-14 PROCEDURE — 6360000002 HC RX W HCPCS: Performed by: STUDENT IN AN ORGANIZED HEALTH CARE EDUCATION/TRAINING PROGRAM

## 2025-06-14 PROCEDURE — 84132 ASSAY OF SERUM POTASSIUM: CPT

## 2025-06-14 PROCEDURE — 2500000003 HC RX 250 WO HCPCS

## 2025-06-14 PROCEDURE — 36415 COLL VENOUS BLD VENIPUNCTURE: CPT

## 2025-06-14 PROCEDURE — 2580000003 HC RX 258: Performed by: STUDENT IN AN ORGANIZED HEALTH CARE EDUCATION/TRAINING PROGRAM

## 2025-06-14 PROCEDURE — 2140000000 HC CCU INTERMEDIATE R&B

## 2025-06-14 PROCEDURE — 6370000000 HC RX 637 (ALT 250 FOR IP)

## 2025-06-14 PROCEDURE — 80048 BASIC METABOLIC PNL TOTAL CA: CPT

## 2025-06-14 PROCEDURE — 6360000002 HC RX W HCPCS

## 2025-06-14 PROCEDURE — 85027 COMPLETE CBC AUTOMATED: CPT

## 2025-06-14 PROCEDURE — 6370000000 HC RX 637 (ALT 250 FOR IP): Performed by: STUDENT IN AN ORGANIZED HEALTH CARE EDUCATION/TRAINING PROGRAM

## 2025-06-14 PROCEDURE — 83735 ASSAY OF MAGNESIUM: CPT

## 2025-06-14 PROCEDURE — 80076 HEPATIC FUNCTION PANEL: CPT

## 2025-06-14 PROCEDURE — 6370000000 HC RX 637 (ALT 250 FOR IP): Performed by: PHYSICIAN ASSISTANT

## 2025-06-14 PROCEDURE — 99233 SBSQ HOSP IP/OBS HIGH 50: CPT | Performed by: STUDENT IN AN ORGANIZED HEALTH CARE EDUCATION/TRAINING PROGRAM

## 2025-06-14 RX ORDER — CALCIUM CARBONATE 500 MG/1
500 TABLET, CHEWABLE ORAL 3 TIMES DAILY PRN
Status: DISCONTINUED | OUTPATIENT
Start: 2025-06-14 | End: 2025-06-16 | Stop reason: HOSPADM

## 2025-06-14 RX ADMIN — LAMOTRIGINE 50 MG: 25 TABLET ORAL at 20:44

## 2025-06-14 RX ADMIN — LACTULOSE 10 G: 20 SOLUTION ORAL at 10:35

## 2025-06-14 RX ADMIN — ENOXAPARIN SODIUM 40 MG: 100 INJECTION SUBCUTANEOUS at 10:35

## 2025-06-14 RX ADMIN — BUSPIRONE HYDROCHLORIDE 15 MG: 7.5 TABLET ORAL at 15:40

## 2025-06-14 RX ADMIN — DICYCLOMINE HYDROCHLORIDE 20 MG: 20 TABLET ORAL at 18:35

## 2025-06-14 RX ADMIN — PANTOPRAZOLE SODIUM 40 MG: 40 TABLET, DELAYED RELEASE ORAL at 18:34

## 2025-06-14 RX ADMIN — SODIUM CHLORIDE, PRESERVATIVE FREE 10 ML: 5 INJECTION INTRAVENOUS at 10:41

## 2025-06-14 RX ADMIN — Medication 1 TABLET: at 10:35

## 2025-06-14 RX ADMIN — TRAZODONE HYDROCHLORIDE 50 MG: 50 TABLET ORAL at 20:44

## 2025-06-14 RX ADMIN — ANTACID TABLETS 500 MG: 500 TABLET, CHEWABLE ORAL at 18:34

## 2025-06-14 RX ADMIN — DICYCLOMINE HYDROCHLORIDE 20 MG: 20 TABLET ORAL at 20:44

## 2025-06-14 RX ADMIN — Medication 100 MG: at 10:35

## 2025-06-14 RX ADMIN — LAMOTRIGINE 50 MG: 25 TABLET ORAL at 10:35

## 2025-06-14 RX ADMIN — PIPERACILLIN AND TAZOBACTAM 3375 MG: 3; .375 INJECTION, POWDER, FOR SOLUTION INTRAVENOUS at 20:56

## 2025-06-14 RX ADMIN — DICYCLOMINE HYDROCHLORIDE 20 MG: 20 TABLET ORAL at 10:35

## 2025-06-14 RX ADMIN — PANTOPRAZOLE SODIUM 40 MG: 40 TABLET, DELAYED RELEASE ORAL at 10:40

## 2025-06-14 RX ADMIN — LACTULOSE 10 G: 20 SOLUTION ORAL at 20:44

## 2025-06-14 RX ADMIN — PIPERACILLIN AND TAZOBACTAM 3375 MG: 3; .375 INJECTION, POWDER, FOR SOLUTION INTRAVENOUS at 23:47

## 2025-06-14 RX ADMIN — Medication 500 MG: at 10:34

## 2025-06-14 RX ADMIN — Medication 500 MG: at 20:44

## 2025-06-14 RX ADMIN — PIPERACILLIN AND TAZOBACTAM 3375 MG: 3; .375 INJECTION, POWDER, FOR SOLUTION INTRAVENOUS at 00:58

## 2025-06-14 RX ADMIN — BUSPIRONE HYDROCHLORIDE 15 MG: 7.5 TABLET ORAL at 10:35

## 2025-06-14 RX ADMIN — DICYCLOMINE HYDROCHLORIDE 20 MG: 20 TABLET ORAL at 15:40

## 2025-06-14 RX ADMIN — VENLAFAXINE HYDROCHLORIDE 50 MG: 50 TABLET ORAL at 10:35

## 2025-06-14 RX ADMIN — RIFAXIMIN 550 MG: 550 TABLET ORAL at 10:41

## 2025-06-14 RX ADMIN — BUSPIRONE HYDROCHLORIDE 15 MG: 7.5 TABLET ORAL at 20:44

## 2025-06-14 RX ADMIN — VENLAFAXINE HYDROCHLORIDE 25 MG: 50 TABLET ORAL at 15:40

## 2025-06-14 RX ADMIN — PIPERACILLIN AND TAZOBACTAM 3375 MG: 3; .375 INJECTION, POWDER, FOR SOLUTION INTRAVENOUS at 10:46

## 2025-06-14 RX ADMIN — VENLAFAXINE HYDROCHLORIDE 50 MG: 50 TABLET ORAL at 18:35

## 2025-06-14 RX ADMIN — RIFAXIMIN 550 MG: 550 TABLET ORAL at 22:10

## 2025-06-14 NOTE — PROGRESS NOTES
Hospitalist Progress Note    Patient:  Elif Martínez      Unit/Bed:3A-10/010-A    YOB: 1980    MRN: [id number]       Acct: [id number]     PCP: Chichi Salmeron APRN - CNP    Date of Admission: 6/10/2025    Assessment/Plan:    Acute pancreatitis: Presented acute abdominal pain associated with nausea.  Lipase 150.  CT A/P peripancreatic edema with fullness of the head of pancreas suspicious for acute pancreatitis, 3 mm calcification of head of pancreas.  Prior history of pancreatitis.  Started on IVF, diet escalated to FLD.  Continue antiemetics and pain control.  US liver biliary ductal dilation may relate to cholecystectomy reservoir phenomenon versus possible choledocholithiasis given echogenic punctate focus in pancreatic head. Started on Zosyn for empiric antibiotic coverage.  GI consulted. MRCP with 2 cm CBD dilation, marked hepatic steatosis and trace ascites 6/12. GI advised transfer can not definitively rule out stone and history of RYGB for ERCP.   Alcoholic cirrhosis: With history of paraesophageal varices.  Follows outpatient with GI at [facility] and Dr. Sahu, gastroenterology.  On Coreg, spironolactone, lactulose and rifaximin.  Diuretics held secondary to aggressive hydration in setting of pancreatitis-see above.  INR 1.45, APTT 16.6.  Transaminitis hepatic anticholinergic pattern and mixed hyperbilirubinemia, AST:ALT >2 likely related to alcohol intake.  Alcohol abuse: Reportedly drinks 4-5 white claws daily, last drink 6/9/2025.  Denies prior history of withdrawal symptoms including autonomic dysfunction, tremors, hallucinations, seizures or DTs.  Presented tremors, likely essential involving BUE/BLE and progressively worsening.  Tremors dissipate with alcohol intake.  MRI negative.  Neurology evaluated, advised psychiatric consult.  PT/OT following. On CIWA protocol  Chronic hypotension: In setting of cirrhosis.  Continue midodrine 10 mg p.o. 3 times daily.  Monitor  BP.  Hypothyroidism: Last TSH 2.93 6/2025.  Continue Synthroid 25 mcg p.o. daily  GERD: Continue metoprolol 40 mg p.o. twice daily  MDD/DUNG/BPD1: Continue BuSpar 15 mg p.o. 3 times daily, Effexor IR 50 mg p.o. twice daily, 25 mg daily and Lamictal 50 mg p.o. twice daily.  Change trazodone to 50 mg p.o. nightly.  On hydroxyzine 3 times daily for as needed for breakthrough anxiety.  Psychiatry evaluated.        Expected discharge date: TBD    Disposition:    [x] Home       [] TCU       [] Rehab       [] Psych       [] SNF       [] Long Term Care Facility       [] Other-    Chief Complaint: Abdominal pain and nausea, vomiting    Hospital Course:   The patient is a 45-year-old female with a PMH of chronic hypotension, alcoholic cirrhosis with esophageal varices, alcohol abuse, GERD, hypothyroidism, and MDD/DUNG who presented to [facility] for complaints of abdominal pain associate with nausea and vomiting.  Per report, the patient reportedly drinks 4-5 white claws daily, with last drink 6/9.  He attributes his drinking related to social stressors.  She states due to abdominal pain he has had decreased p.o. oral intake.  Additionally, she notes a progressively worsening tremor involving the upper and lower extremities that precipitated a fall.  She stated that alcohol helps with these tremors.  Upon admission MRI spine showed mild canal stenosis involving C4-5 through C6/7, focal disc protrusion at T7-8 but not contributing to canal stenosis, mild canal stenosis L4-5. Neurology was consulted.  MRI brain was negative with infectious and metabolic workup, including vitamin B12 levels noncontributory.  Psychiatry was then further consulted.  Additionally, US liver showed biliary ductal dilation secondary to likely cholecystectomy reservoir phenomenon but with punctate echogenic focus in pancreatic head which could relate to choledocholithiasis 6/10.  GI was consulted 6/12.  MRCP showed 2 cm CBD dilation, marked hepatic steatosis    BILIDIR 1.1*   BILITOT 1.7*   ALKPHOS 141*     Recent Labs     06/13/25  0810   INR 1.39*     No results for input(s): \"CKTOTAL\", \"TROPONINI\" in the last 72 hours.    Microbiology:      Urinalysis:      Lab Results   Component Value Date/Time    NITRU NEGATIVE 06/10/2024 06:35 PM    WBCUA NONE 06/10/2024 06:35 PM    BACTERIA FEW 06/10/2024 06:35 PM    RBCUA 0-2 06/10/2024 06:35 PM    RBCUA OBSCURED 02/04/2019 09:50 PM    BLOODU NEGATIVE 06/10/2024 06:35 PM    GLUCOSEU NEGATIVE 06/10/2024 06:35 PM       Radiology:  MRI ABDOMEN W WO CONTRAST MRCP   Final Result   1. The postcholecystectomy common duct is dilated at 2 cm.   2. Marked hepatic steatosis   3. Trace ascites.            **This report has been created using voice recognition software.  It may contain   minor errors which are inherent in voice recognition technology.**      Electronically signed by Dr. [facility]   Final Result   Biliary ductal dilatation may be secondary to cholecystectomy reservoir    phenomenon, however this is not entirely definitive and there is a    punctate echogenic focus in the pancreatic head which could represent    choledocholithiasis.      This document has been electronically signed by: Salvatore Reilly MD on    06/10/2025 11:59 PM      MRI BRAIN W WO CONTRAST   Final Result      1. Global volume loss.   2. No acute findings.               **This report has been created using voice recognition software. It may contain   minor errors which are inherent in voice recognition technology.**               Electronically signed by Dr. Lisa Palmer      MRI CERVICAL SPINE WO CONTRAST   Final Result      1. No acute findings in the cervical spine.   2. Degenerative changes in the cervical spine with mild spinal canal stenosis at   the C4-5 through C6-7 levels.               **This report has been created using voice recognition software. It may contain   minor errors which are inherent in voice recognition

## 2025-06-14 NOTE — PROGRESS NOTES
Attempted addiction consult. Pt on phone at this time and asked for writer to re-attempt. HENNA to re-attempt.

## 2025-06-15 LAB
ALBUMIN SERPL BCG-MCNC: 3.4 G/DL (ref 3.4–4.9)
ALP SERPL-CCNC: 104 U/L (ref 38–126)
ALT SERPL W/O P-5'-P-CCNC: 41 U/L (ref 10–35)
ANION GAP SERPL CALC-SCNC: 7 MEQ/L (ref 8–16)
AST SERPL-CCNC: 41 U/L (ref 10–35)
BILIRUB CONJ SERPL-MCNC: 0.5 MG/DL (ref 0–0.2)
BILIRUB SERPL-MCNC: 1.1 MG/DL (ref 0.3–1.2)
BUN SERPL-MCNC: 4 MG/DL (ref 8–23)
CALCIUM SERPL-MCNC: 8.8 MG/DL (ref 8.6–10)
CHLORIDE SERPL-SCNC: 106 MEQ/L (ref 98–111)
CO2 SERPL-SCNC: 26 MEQ/L (ref 22–29)
CREAT SERPL-MCNC: 0.6 MG/DL (ref 0.5–0.9)
DEPRECATED RDW RBC AUTO: 47.1 FL (ref 35–45)
EKG ATRIAL RATE: 98 BPM
EKG P AXIS: 33 DEGREES
EKG P-R INTERVAL: 140 MS
EKG Q-T INTERVAL: 350 MS
EKG QRS DURATION: 70 MS
EKG QTC CALCULATION (BAZETT): 446 MS
EKG R AXIS: 15 DEGREES
EKG T AXIS: 46 DEGREES
EKG VENTRICULAR RATE: 98 BPM
ERYTHROCYTE [DISTWIDTH] IN BLOOD BY AUTOMATED COUNT: 12.8 % (ref 11.5–14.5)
GFR SERPL CREATININE-BSD FRML MDRD: > 90 ML/MIN/1.73M2
GLUCOSE SERPL-MCNC: 125 MG/DL (ref 74–109)
HCT VFR BLD AUTO: 37.2 % (ref 37–47)
HGB BLD-MCNC: 12.2 GM/DL (ref 12–16)
LIPASE SERPL-CCNC: 68 U/L (ref 13–60)
MAGNESIUM SERPL-MCNC: 2 MG/DL (ref 1.6–2.6)
MAGNESIUM SERPL-MCNC: 2 MG/DL (ref 1.6–2.6)
MCH RBC QN AUTO: 32.9 PG (ref 26–33)
MCHC RBC AUTO-ENTMCNC: 32.8 GM/DL (ref 32.2–35.5)
MCV RBC AUTO: 100.3 FL (ref 81–99)
PLATELET # BLD AUTO: 129 THOU/MM3 (ref 130–400)
PMV BLD AUTO: 11.6 FL (ref 9.4–12.4)
POTASSIUM SERPL-SCNC: 3.4 MEQ/L (ref 3.5–5.2)
PROT SERPL-MCNC: 5.9 G/DL (ref 6.4–8.3)
RBC # BLD AUTO: 3.71 MILL/MM3 (ref 4.2–5.4)
SODIUM SERPL-SCNC: 139 MEQ/L (ref 135–145)
VIT B1 PYROPHOSHATE BLD-SCNC: 135 NMOL/L (ref 70–180)
WBC # BLD AUTO: 3.7 THOU/MM3 (ref 4.8–10.8)

## 2025-06-15 PROCEDURE — 6370000000 HC RX 637 (ALT 250 FOR IP): Performed by: PHYSICIAN ASSISTANT

## 2025-06-15 PROCEDURE — 6360000002 HC RX W HCPCS

## 2025-06-15 PROCEDURE — 99233 SBSQ HOSP IP/OBS HIGH 50: CPT | Performed by: STUDENT IN AN ORGANIZED HEALTH CARE EDUCATION/TRAINING PROGRAM

## 2025-06-15 PROCEDURE — 6370000000 HC RX 637 (ALT 250 FOR IP)

## 2025-06-15 PROCEDURE — 6360000002 HC RX W HCPCS: Performed by: STUDENT IN AN ORGANIZED HEALTH CARE EDUCATION/TRAINING PROGRAM

## 2025-06-15 PROCEDURE — 85027 COMPLETE CBC AUTOMATED: CPT

## 2025-06-15 PROCEDURE — 83690 ASSAY OF LIPASE: CPT

## 2025-06-15 PROCEDURE — 2500000003 HC RX 250 WO HCPCS

## 2025-06-15 PROCEDURE — 93005 ELECTROCARDIOGRAM TRACING: CPT | Performed by: STUDENT IN AN ORGANIZED HEALTH CARE EDUCATION/TRAINING PROGRAM

## 2025-06-15 PROCEDURE — 83735 ASSAY OF MAGNESIUM: CPT

## 2025-06-15 PROCEDURE — 80076 HEPATIC FUNCTION PANEL: CPT

## 2025-06-15 PROCEDURE — 36415 COLL VENOUS BLD VENIPUNCTURE: CPT

## 2025-06-15 PROCEDURE — 80048 BASIC METABOLIC PNL TOTAL CA: CPT

## 2025-06-15 PROCEDURE — 2580000003 HC RX 258: Performed by: STUDENT IN AN ORGANIZED HEALTH CARE EDUCATION/TRAINING PROGRAM

## 2025-06-15 PROCEDURE — 2140000000 HC CCU INTERMEDIATE R&B

## 2025-06-15 RX ORDER — SODIUM CHLORIDE 9 MG/ML
INJECTION, SOLUTION INTRAVENOUS CONTINUOUS
Status: DISCONTINUED | OUTPATIENT
Start: 2025-06-15 | End: 2025-06-16 | Stop reason: HOSPADM

## 2025-06-15 RX ADMIN — PANTOPRAZOLE SODIUM 40 MG: 40 TABLET, DELAYED RELEASE ORAL at 08:34

## 2025-06-15 RX ADMIN — Medication 500 MG: at 22:07

## 2025-06-15 RX ADMIN — BUSPIRONE HYDROCHLORIDE 15 MG: 7.5 TABLET ORAL at 14:00

## 2025-06-15 RX ADMIN — VENLAFAXINE HYDROCHLORIDE 25 MG: 50 TABLET ORAL at 13:59

## 2025-06-15 RX ADMIN — LACTULOSE 10 G: 20 SOLUTION ORAL at 08:34

## 2025-06-15 RX ADMIN — LACTULOSE 10 G: 20 SOLUTION ORAL at 22:09

## 2025-06-15 RX ADMIN — BUSPIRONE HYDROCHLORIDE 15 MG: 7.5 TABLET ORAL at 08:34

## 2025-06-15 RX ADMIN — LACTULOSE 10 G: 20 SOLUTION ORAL at 14:00

## 2025-06-15 RX ADMIN — SODIUM CHLORIDE: 9 INJECTION, SOLUTION INTRAVENOUS at 18:22

## 2025-06-15 RX ADMIN — PANTOPRAZOLE SODIUM 40 MG: 40 TABLET, DELAYED RELEASE ORAL at 22:08

## 2025-06-15 RX ADMIN — Medication 1 TABLET: at 08:34

## 2025-06-15 RX ADMIN — LAMOTRIGINE 50 MG: 25 TABLET ORAL at 08:34

## 2025-06-15 RX ADMIN — SODIUM CHLORIDE, PRESERVATIVE FREE 10 ML: 5 INJECTION INTRAVENOUS at 08:34

## 2025-06-15 RX ADMIN — POTASSIUM CHLORIDE 40 MEQ: 1500 TABLET, EXTENDED RELEASE ORAL at 22:08

## 2025-06-15 RX ADMIN — PIPERACILLIN AND TAZOBACTAM 3375 MG: 3; .375 INJECTION, POWDER, FOR SOLUTION INTRAVENOUS at 14:03

## 2025-06-15 RX ADMIN — PROCHLORPERAZINE EDISYLATE 10 MG: 5 INJECTION INTRAMUSCULAR; INTRAVENOUS at 16:42

## 2025-06-15 RX ADMIN — DICYCLOMINE HYDROCHLORIDE 20 MG: 20 TABLET ORAL at 14:00

## 2025-06-15 RX ADMIN — RIFAXIMIN 550 MG: 550 TABLET ORAL at 22:11

## 2025-06-15 RX ADMIN — BUSPIRONE HYDROCHLORIDE 15 MG: 7.5 TABLET ORAL at 22:08

## 2025-06-15 RX ADMIN — ONDANSETRON 4 MG: 2 INJECTION, SOLUTION INTRAMUSCULAR; INTRAVENOUS at 09:19

## 2025-06-15 RX ADMIN — TRAZODONE HYDROCHLORIDE 50 MG: 50 TABLET ORAL at 22:08

## 2025-06-15 RX ADMIN — DICYCLOMINE HYDROCHLORIDE 20 MG: 20 TABLET ORAL at 22:08

## 2025-06-15 RX ADMIN — VENLAFAXINE HYDROCHLORIDE 50 MG: 50 TABLET ORAL at 22:06

## 2025-06-15 RX ADMIN — DICYCLOMINE HYDROCHLORIDE 20 MG: 20 TABLET ORAL at 08:34

## 2025-06-15 RX ADMIN — VENLAFAXINE HYDROCHLORIDE 50 MG: 50 TABLET ORAL at 08:33

## 2025-06-15 RX ADMIN — ENOXAPARIN SODIUM 40 MG: 100 INJECTION SUBCUTANEOUS at 08:33

## 2025-06-15 RX ADMIN — Medication 500 MG: at 08:33

## 2025-06-15 RX ADMIN — Medication 100 MG: at 08:33

## 2025-06-15 RX ADMIN — LAMOTRIGINE 50 MG: 25 TABLET ORAL at 22:07

## 2025-06-15 RX ADMIN — RIFAXIMIN 550 MG: 550 TABLET ORAL at 08:34

## 2025-06-15 ASSESSMENT — PAIN SCALES - GENERAL: PAINLEVEL_OUTOF10: 0

## 2025-06-15 NOTE — PROCEDURES
PROCEDURE NOTE  Date: 6/15/2025   Name: Elif Martínez  YOB: 1980    Procedures EKG completed, given to RN

## 2025-06-15 NOTE — PROGRESS NOTES
Hospitalist Progress Note    Patient:  Elif Martínez      Unit/Bed:3A-10/010-A    YOB: 1980    MRN: [id number]       Acct: [id number]     PCP: Chichi Salmeron APRN - CNP    Date of Admission: 6/10/2025    Assessment/Plan:    Acute pancreatitis: Presented acute abdominal pain associated with nausea.  Lipase 150.  CT A/P peripancreatic edema with fullness of the head of pancreas suspicious for acute pancreatitis, 3 mm calcification of head of pancreas.  Prior history of pancreatitis.  Started on IVF, diet escalated to FLD.  Continue antiemetics and pain control.  US liver biliary ductal dilation may relate to cholecystectomy reservoir phenomenon versus possible choledocholithiasis given echogenic punctate focus in pancreatic head. Started on Zosyn for empiric antibiotic coverage.  GI consulted. MRCP with 2 cm CBD dilation, marked hepatic steatosis and trace ascites 6/12. GI advised transfer can not definitively rule out stone and history of RYGB for ERCP.   Alcoholic cirrhosis: With history of paraesophageal varices.  Follows outpatient with GI at [facility] and Dr. Sahu, gastroenterology.  On Coreg, spironolactone, lactulose and rifaximin.  Diuretics held secondary to aggressive hydration in setting of pancreatitis-see above.  INR 1.45, APTT 16.6.  Transaminitis hepatic anticholinergic pattern and mixed hyperbilirubinemia, AST:ALT >2 likely related to alcohol intake.  Alcohol abuse: Reportedly drinks 4-5 white claws daily, last drink 6/9/2025.  Denies prior history of withdrawal symptoms including autonomic dysfunction, tremors, hallucinations, seizures or DTs.  Presented tremors, likely essential involving BUE/BLE and progressively worsening.  Tremors dissipate with alcohol intake.  MRI negative.  Neurology evaluated, advised psychiatric consult.  PT/OT following. On CIWA protocol  Chronic hypotension: In setting of cirrhosis.  Continue midodrine 10 mg p.o. 3 times daily.  Monitor  size. Old healed granulomatous disease. Mild pectus excavatum.   2. No acute findings. No infiltrates or effusions are seen.            **This report has been created using voice recognition software.  It may contain   minor errors which are inherent in voice recognition technology.**            Electronically signed by Dr. Tremayne Goodrich          DVT prophylaxis: [x] Lovenox                                 [] SCDs                                 [] SQ Heparin                                 [] Encourage ambulation           [] Already on Anticoagulation     Code Status: Full Code    PT/OT Eval Status: PT/OT following    Tele:   [x] yes             [] no    Electronically signed by Jorge Luis Etienne DO on 6/15/2025 at 8:43 AM

## 2025-06-15 NOTE — PROGRESS NOTES
ADDICTION SBIRT attempted patient sleeping did not awake to verbal instructions. HENNA  to re-attempt at later time

## 2025-06-16 VITALS
BODY MASS INDEX: 23.43 KG/M2 | RESPIRATION RATE: 17 BRPM | SYSTOLIC BLOOD PRESSURE: 119 MMHG | DIASTOLIC BLOOD PRESSURE: 82 MMHG | HEART RATE: 81 BPM | TEMPERATURE: 98.1 F | WEIGHT: 140.65 LBS | HEIGHT: 65 IN | OXYGEN SATURATION: 97 %

## 2025-06-16 VITALS
DIASTOLIC BLOOD PRESSURE: 78 MMHG | TEMPERATURE: 98 F | OXYGEN SATURATION: 97 % | HEART RATE: 83 BPM | RESPIRATION RATE: 15 BRPM | SYSTOLIC BLOOD PRESSURE: 143 MMHG | BODY MASS INDEX: 25.43 KG/M2 | HEIGHT: 65 IN | WEIGHT: 152.6 LBS

## 2025-06-16 LAB
ALBUMIN SERPL BCG-MCNC: 3.2 G/DL (ref 3.4–4.9)
ALP SERPL-CCNC: 103 U/L (ref 38–126)
ALT SERPL W/O P-5'-P-CCNC: 41 U/L (ref 10–35)
ANION GAP SERPL CALC-SCNC: 7 MEQ/L (ref 8–16)
AST SERPL-CCNC: 37 U/L (ref 10–35)
BILIRUB CONJ SERPL-MCNC: 0.5 MG/DL (ref 0–0.2)
BILIRUB SERPL-MCNC: 0.8 MG/DL (ref 0.3–1.2)
BUN SERPL-MCNC: 7 MG/DL (ref 8–23)
CALCIUM SERPL-MCNC: 8.9 MG/DL (ref 8.6–10)
CHLORIDE SERPL-SCNC: 110 MEQ/L (ref 98–111)
CO2 SERPL-SCNC: 26 MEQ/L (ref 22–29)
CREAT SERPL-MCNC: 0.6 MG/DL (ref 0.5–0.9)
DEPRECATED RDW RBC AUTO: 47.8 FL (ref 35–45)
EKG ATRIAL RATE: 98 BPM
EKG P AXIS: 33 DEGREES
EKG P-R INTERVAL: 140 MS
EKG Q-T INTERVAL: 350 MS
EKG QRS DURATION: 70 MS
EKG QTC CALCULATION (BAZETT): 446 MS
EKG R AXIS: 15 DEGREES
EKG T AXIS: 46 DEGREES
EKG VENTRICULAR RATE: 98 BPM
ERYTHROCYTE [DISTWIDTH] IN BLOOD BY AUTOMATED COUNT: 12.9 % (ref 11.5–14.5)
GFR SERPL CREATININE-BSD FRML MDRD: > 90 ML/MIN/1.73M2
GLUCOSE SERPL-MCNC: 111 MG/DL (ref 74–109)
HCT VFR BLD AUTO: 36.1 % (ref 37–47)
HGB BLD-MCNC: 11.7 GM/DL (ref 12–16)
MCH RBC QN AUTO: 33.2 PG (ref 26–33)
MCHC RBC AUTO-ENTMCNC: 32.4 GM/DL (ref 32.2–35.5)
MCV RBC AUTO: 102.6 FL (ref 81–99)
PLATELET # BLD AUTO: 144 THOU/MM3 (ref 130–400)
PMV BLD AUTO: 10.7 FL (ref 9.4–12.4)
POTASSIUM SERPL-SCNC: 4.4 MEQ/L (ref 3.5–5.2)
PROT SERPL-MCNC: 5.6 G/DL (ref 6.4–8.3)
RBC # BLD AUTO: 3.52 MILL/MM3 (ref 4.2–5.4)
SODIUM SERPL-SCNC: 143 MEQ/L (ref 135–145)
WBC # BLD AUTO: 4 THOU/MM3 (ref 4.8–10.8)

## 2025-06-16 PROCEDURE — 97112 NEUROMUSCULAR REEDUCATION: CPT

## 2025-06-16 PROCEDURE — 36415 COLL VENOUS BLD VENIPUNCTURE: CPT

## 2025-06-16 PROCEDURE — 85027 COMPLETE CBC AUTOMATED: CPT

## 2025-06-16 PROCEDURE — 80076 HEPATIC FUNCTION PANEL: CPT

## 2025-06-16 PROCEDURE — 80048 BASIC METABOLIC PNL TOTAL CA: CPT

## 2025-06-16 PROCEDURE — 97110 THERAPEUTIC EXERCISES: CPT

## 2025-06-16 PROCEDURE — 6370000000 HC RX 637 (ALT 250 FOR IP)

## 2025-06-16 PROCEDURE — 6360000002 HC RX W HCPCS

## 2025-06-16 PROCEDURE — 97162 PT EVAL MOD COMPLEX 30 MIN: CPT

## 2025-06-16 PROCEDURE — 97530 THERAPEUTIC ACTIVITIES: CPT

## 2025-06-16 PROCEDURE — 93010 ELECTROCARDIOGRAM REPORT: CPT | Performed by: INTERNAL MEDICINE

## 2025-06-16 PROCEDURE — 99233 SBSQ HOSP IP/OBS HIGH 50: CPT | Performed by: STUDENT IN AN ORGANIZED HEALTH CARE EDUCATION/TRAINING PROGRAM

## 2025-06-16 PROCEDURE — 97116 GAIT TRAINING THERAPY: CPT

## 2025-06-16 RX ADMIN — BUSPIRONE HYDROCHLORIDE 15 MG: 7.5 TABLET ORAL at 19:11

## 2025-06-16 RX ADMIN — LAMOTRIGINE 50 MG: 25 TABLET ORAL at 08:19

## 2025-06-16 RX ADMIN — PROCHLORPERAZINE EDISYLATE 10 MG: 5 INJECTION INTRAMUSCULAR; INTRAVENOUS at 08:18

## 2025-06-16 RX ADMIN — DICYCLOMINE HYDROCHLORIDE 20 MG: 20 TABLET ORAL at 08:20

## 2025-06-16 RX ADMIN — VENLAFAXINE HYDROCHLORIDE 50 MG: 50 TABLET ORAL at 17:02

## 2025-06-16 RX ADMIN — VENLAFAXINE HYDROCHLORIDE 50 MG: 50 TABLET ORAL at 08:19

## 2025-06-16 RX ADMIN — Medication 500 MG: at 19:11

## 2025-06-16 RX ADMIN — PANTOPRAZOLE SODIUM 40 MG: 40 TABLET, DELAYED RELEASE ORAL at 17:02

## 2025-06-16 RX ADMIN — Medication 1 TABLET: at 08:21

## 2025-06-16 RX ADMIN — PROCHLORPERAZINE EDISYLATE 10 MG: 5 INJECTION INTRAMUSCULAR; INTRAVENOUS at 12:46

## 2025-06-16 RX ADMIN — VENLAFAXINE HYDROCHLORIDE 25 MG: 50 TABLET ORAL at 12:46

## 2025-06-16 RX ADMIN — Medication 100 MG: at 08:20

## 2025-06-16 RX ADMIN — PANTOPRAZOLE SODIUM 40 MG: 40 TABLET, DELAYED RELEASE ORAL at 08:19

## 2025-06-16 RX ADMIN — Medication 500 MG: at 08:19

## 2025-06-16 RX ADMIN — DICYCLOMINE HYDROCHLORIDE 20 MG: 20 TABLET ORAL at 17:02

## 2025-06-16 RX ADMIN — BUSPIRONE HYDROCHLORIDE 15 MG: 7.5 TABLET ORAL at 13:51

## 2025-06-16 RX ADMIN — HYDROXYZINE HYDROCHLORIDE 25 MG: 25 TABLET, FILM COATED ORAL at 19:12

## 2025-06-16 RX ADMIN — LAMOTRIGINE 50 MG: 25 TABLET ORAL at 19:11

## 2025-06-16 RX ADMIN — DICYCLOMINE HYDROCHLORIDE 20 MG: 20 TABLET ORAL at 19:11

## 2025-06-16 RX ADMIN — DICYCLOMINE HYDROCHLORIDE 20 MG: 20 TABLET ORAL at 12:46

## 2025-06-16 RX ADMIN — PROCHLORPERAZINE EDISYLATE 10 MG: 5 INJECTION INTRAMUSCULAR; INTRAVENOUS at 17:03

## 2025-06-16 RX ADMIN — BUSPIRONE HYDROCHLORIDE 15 MG: 7.5 TABLET ORAL at 08:20

## 2025-06-16 RX ADMIN — RIFAXIMIN 550 MG: 550 TABLET ORAL at 12:46

## 2025-06-16 RX ADMIN — ONDANSETRON 4 MG: 4 TABLET, ORALLY DISINTEGRATING ORAL at 09:06

## 2025-06-16 RX ADMIN — ENOXAPARIN SODIUM 40 MG: 100 INJECTION SUBCUTANEOUS at 08:19

## 2025-06-16 ASSESSMENT — PAIN SCALES - GENERAL
PAINLEVEL_OUTOF10: 2
PAINLEVEL_OUTOF10: 0
PAINLEVEL_OUTOF10: 0

## 2025-06-16 ASSESSMENT — PATIENT HEALTH QUESTIONNAIRE - PHQ9
2. FEELING DOWN, DEPRESSED OR HOPELESS: MORE THAN HALF THE DAYS
3. TROUBLE FALLING OR STAYING ASLEEP: NEARLY EVERY DAY
8. MOVING OR SPEAKING SO SLOWLY THAT OTHER PEOPLE COULD HAVE NOTICED. OR THE OPPOSITE, BEING SO FIGETY OR RESTLESS THAT YOU HAVE BEEN MOVING AROUND A LOT MORE THAN USUAL: NEARLY EVERY DAY
SUM OF ALL RESPONSES TO PHQ QUESTIONS 1-9: 17
SUM OF ALL RESPONSES TO PHQ QUESTIONS 1-9: 17
5. POOR APPETITE OR OVEREATING: NOT AT ALL
7. TROUBLE CONCENTRATING ON THINGS, SUCH AS READING THE NEWSPAPER OR WATCHING TELEVISION: MORE THAN HALF THE DAYS
6. FEELING BAD ABOUT YOURSELF - OR THAT YOU ARE A FAILURE OR HAVE LET YOURSELF OR YOUR FAMILY DOWN: MORE THAN HALF THE DAYS
SUM OF ALL RESPONSES TO PHQ QUESTIONS 1-9: 17
1. LITTLE INTEREST OR PLEASURE IN DOING THINGS: MORE THAN HALF THE DAYS
10. IF YOU CHECKED OFF ANY PROBLEMS, HOW DIFFICULT HAVE THESE PROBLEMS MADE IT FOR YOU TO DO YOUR WORK, TAKE CARE OF THINGS AT HOME, OR GET ALONG WITH OTHER PEOPLE: EXTREMELY DIFFICULT
4. FEELING TIRED OR HAVING LITTLE ENERGY: NEARLY EVERY DAY
SUM OF ALL RESPONSES TO PHQ QUESTIONS 1-9: 17
9. THOUGHTS THAT YOU WOULD BE BETTER OFF DEAD, OR OF HURTING YOURSELF: NOT AT ALL

## 2025-06-16 ASSESSMENT — PAIN DESCRIPTION - FREQUENCY: FREQUENCY: INTERMITTENT

## 2025-06-16 ASSESSMENT — PAIN DESCRIPTION - PAIN TYPE: TYPE: ACUTE PAIN

## 2025-06-16 ASSESSMENT — LIFESTYLE VARIABLES
HOW MANY STANDARD DRINKS CONTAINING ALCOHOL DO YOU HAVE ON A TYPICAL DAY: 1 OR 2
HOW OFTEN DO YOU HAVE A DRINK CONTAINING ALCOHOL: 2-3 TIMES A WEEK

## 2025-06-16 ASSESSMENT — PAIN DESCRIPTION - DIRECTION: RADIATING_TOWARDS: BACK

## 2025-06-16 ASSESSMENT — PAIN DESCRIPTION - LOCATION
LOCATION: ABDOMEN
LOCATION: ABDOMEN

## 2025-06-16 ASSESSMENT — PAIN DESCRIPTION - ORIENTATION: ORIENTATION: MID;POSTERIOR

## 2025-06-16 ASSESSMENT — PAIN - FUNCTIONAL ASSESSMENT: PAIN_FUNCTIONAL_ASSESSMENT: ACTIVITIES ARE NOT PREVENTED

## 2025-06-16 ASSESSMENT — PAIN DESCRIPTION - DESCRIPTORS: DESCRIPTORS: CRAMPING

## 2025-06-16 ASSESSMENT — PAIN DESCRIPTION - ONSET: ONSET: ON-GOING

## 2025-06-16 NOTE — CONSULTS
Brief Intervention and Referral to Treatment Summary    Patient was provided PHQ-9, AUDIT-C and DAST Screening:      PHQ-9 Score: 17  AUDIT-C Score:  4  DAST Score:  0    Patient’s substance use is considered     Moderate Risk      Patient’s depression is considered:     Moderate    Brief Education Was Provided    Patient was receptive      Brief Intervention(s) Provided  at time of screening(Only for AUDIT-C or DAST)     Document Brief Intervention (corresponds directly with the 5 A's, Ask, Advise, Assess, Assist, and Arrange): Use examples below.  NA is not to be used.        Patient admitted to feeling depressed and little interest in activities for more than half the days. Patient reports \"I lost my sister like two weeks ago. She was my rock. The matriarch.\" Patient scored 17 on PHQ-9. Patient admitted to drinking two alcoholic beverages on a weekly basis. Patient admitted to drinking six and more alcoholic beverages on a less then monthly basis. Patient scored 4 on AUDIT-C. Patient expressed interest to decrease use of alcohol and stabilize mental health. Patient is engaged in services with [facility]. Patient denies follow up appointment due to hospital treatment. Patient reports plan to reschedule follow up appointment. Patient denies need for  to assist in scheduling follow up appointment.  informed patient of follow up call that will take place after discharge; patient confirm phone number is correct and accurate.     At- Risk Patients (Score 7-15 for women; 8-15 for men) MUST HAVE A BRIEF INTERVENTION IDENTIFIED  Discuss concern patient is drinking at unhealthy levels known to increase risk of alcohol-related health problems.    Is Patient ready to commit to change? YES    If No:  Encourage reflection  Discuss short term and long term health risks of consuming alcohol  Barriers to change  Reaffirm willingness to help / Educational or Resource materials provided    If Yes: (Score

## 2025-06-16 NOTE — PLAN OF CARE
Problem: Discharge Planning  Goal: Discharge to home or other facility with appropriate resources  Outcome: Progressing  Flowsheets  Taken 6/16/2025 0221  Discharge to home or other facility with appropriate resources: Identify barriers to discharge with patient and caregiver  Taken 6/16/2025 0000  Discharge to home or other facility with appropriate resources: Identify barriers to discharge with patient and caregiver     Problem: Seizure Precautions  Goal: Remains free of injury related to seizures activity  Outcome: Progressing  Flowsheets (Taken 6/16/2025 0221)  Remains free of injury related to seizure activity: Maintain airway, patient safety  and administer oxygen as ordered     Problem: Skin/Tissue Integrity  Goal: Skin integrity remains intact  Description: 1.  Monitor for areas of redness and/or skin breakdown2.  Assess vascular access sites hourly3.  Every 4-6 hours minimum:  Change oxygen saturation probe site4.  Every 4-6 hours:  If on nasal continuous positive airway pressure, respiratory therapy assess nares and determine need for appliance change or resting period  Outcome: Progressing  Flowsheets  Taken 6/16/2025 0221  Skin Integrity Remains Intact: Monitor for areas of redness and/or skin breakdown  Taken 6/16/2025 0220  Skin Integrity Remains Intact: Monitor for areas of redness and/or skin breakdown  Taken 6/16/2025 0000  Skin Integrity Remains Intact: Monitor for areas of redness and/or skin breakdown     Problem: ABCDS Injury Assessment  Goal: Absence of physical injury  Outcome: Progressing  Flowsheets (Taken 6/16/2025 0221)  Absence of Physical Injury: Implement safety measures based on patient assessment     Problem: Safety - Adult  Goal: Free from fall injury  Outcome: Progressing  Flowsheets  Taken 6/16/2025 0221  Free From Fall Injury: Instruct family/caregiver on patient safety  Taken 6/16/2025 0220  Free From Fall Injury: Instruct family/caregiver on patient safety     Problem:

## 2025-06-16 NOTE — PROGRESS NOTES
Hospitalist Progress Note    Patient:  Elif Martínez      Unit/Bed:3A-10/010-A    YOB: 1980    MRN: [id number]       Acct: [id number]     PCP: Chichi Salmeron APRN - CNP    Date of Admission: 6/10/2025    Assessment/Plan:    Acute pancreatitis: Presented acute abdominal pain associated with nausea.  Lipase 150.  CT A/P peripancreatic edema with fullness of the head of pancreas suspicious for acute pancreatitis, 3 mm calcification of head of pancreas.  Prior history of pancreatitis.  Started on IVF, diet escalated to FLD.  Continue antiemetics and pain control.  US liver biliary ductal dilation may relate to cholecystectomy reservoir phenomenon versus possible choledocholithiasis given echogenic punctate focus in pancreatic head. Started on Zosyn for empiric antibiotic coverage.  GI consulted. MRCP with 2 cm CBD dilation, marked hepatic steatosis and trace ascites 6/12. GI advised transfer can not definitively rule out stone and history of RYGB for ERCP.   Alcoholic cirrhosis: With history of paraesophageal varices.  Follows outpatient with GI at [facility] and Dr. Sahu, gastroenterology.  On Coreg, spironolactone, lactulose and rifaximin.  Diuretics held secondary to aggressive hydration in setting of pancreatitis-see above.  INR 1.45, APTT 16.6.  Transaminitis hepatic anticholinergic pattern and mixed hyperbilirubinemia, AST:ALT >2 likely related to alcohol intake.  Alcohol abuse: Reportedly drinks 4-5 white claws daily, last drink 6/9/2025.  Denies prior history of withdrawal symptoms including autonomic dysfunction, tremors, hallucinations, seizures or DTs.  Presented tremors, likely essential involving BUE/BLE and progressively worsening.  Tremors dissipate with alcohol intake.  MRI negative.  Neurology evaluated, advised psychiatric consult.  PT/OT following. On CIWA protocol  Chronic hypotension: In setting of cirrhosis.  Continue midodrine 10 mg p.o. 3 times daily.  Monitor

## 2025-06-16 NOTE — SIGNIFICANT EVENT
Case discussed with [facility].  They have accepted the patient for possible ERCP.  Awaiting bed prior to transfer.

## 2025-06-16 NOTE — PROGRESS NOTES
[facility]  [facility]Z CCU 3A  Occupational Therapy  Daily Note    Discharge Recommendations: Inpatient Rehabilitation and Patient would benefit from continued OT at discharge  Equipment Recommendations: Yes Grabbars in shower      Time In: 955  Time Out: 1040  Timed Code Treatment Minutes: 45 Minutes  Minutes: 45          Date: 2025  Patient Name: Elif Martínez,   Gender: female      Room: [address] [place] [address]  MRN: [id number]  : 1980  (45 y.o.)  Referring Practitioner: Dr. FRANCI Saldana MD  Diagnosis: Abdominal pain  Additional Pertinent Hx: Pt with PMHx of alcoholic cirrhosis, alcohol abuse, hypothyroidism who presents to [facility] with nausea vomiting abdominal pain and new tremor.  Patient reports nausea vomiting abdominal pain over the past 24 hours, history of alcoholic cirrhosis reports that she recently started drinking again secondary to social stressors.  Last drink was yesterday reports that she had 4-5 white claws.  On presentation to the hospital states that she had intractable nausea vomiting and abdominal pain.  CT abdomen pelvis positive for pancreatitis.     Reports new onset of tremor over the past 1 to 2 weeks, bilateral upper and lower extremities states that she had a fall and after which started developing tremors which have worsened over the past 2 weeks now states that she is unable to walk.  Patient reports that when she drinks alcohol the tremors disappear.    Restrictions/Precautions:  Restrictions/Precautions: Fall Risk, Isolation     Social/Functional History:  Lives With: Spouse  Type of Home: House  Home Layout: Multi-level, Laundry in basement, Bed/Bath upstairs  Home Access: Stairs to enter with rails  Entrance Stairs - Number of Steps: 3 steps with a handrail to enter but 3 steps + 7 steps to go to main level and another flight to get upstairs.  Pt has rails on all but the steps going into basement.  Home Equipment: Walker - Rolling   Bathroom Shower/Tub:  Tub/Shower unit, Walk-in shower, Shower chair with back  Bathroom Toilet: Standard  Bathroom Accessibility: Accessible    Receives Help From: Family  Prior Level of Assist for ADLs: Independent  Prior Level of Assist for Homemaking: Needs assistance  Homemaking Responsibilities: Yes  Prior Level of Assist for Transfers: Independent  Prior Level of Assist for Ambulation: Independent community ambulator, with or without device  Has the patient had two or more falls in the past year or any fall with injury in the past year?: Yes       Occupation: On disability  Leisure & Hobbies: Playing with her 2 dogs; doing puzzles or math problems online  Additional Comments: Pt was not using any AD recently. She has been doing her own self care.  She struggled with doing laundry secondary to carrying basket to/from the basement.  She had a fall 2 weeks ago and saw a chiropractor after.  She has had more difficulty with legs feeling unsteady and increase in  the tremors in her hands.  She sleeps over 8 hours every night and often straight through the night.    SUBJECTIVE: Nurse Jae huang'todd session, In bed upon arrival, agreeable to OT session, cooperative and motivated    PAIN: 0/10:     Vitals: Vitals not assessed per clinical judgement, see nursing flowsheet    COGNITION: WFL    ADL:   Grooming: with set-up.  Combed hair and pulled up into ponytail sitting in bedside chair.    IADL:   Not Tested    BED MOBILITY:  Supine to Sit: Stand By Assistance HOB elevated, used bedrail    TRANSFERS:  Sit to Stand:  Contact Guard Assistance. EOB  Stand to Sit: Contact Guard Assistance. Bedside chair    FUNCTIONAL MOBILITY:  Assistive Device: Rolling Walker  Assist Level:  Contact Guard Assistance.   Distance: EOB to bedside chair     ADDITIONAL ACTIVITIES:  Patient completed BUE strengthening exercises with a yellow resistance band, completing 10 reps 1 set in all joints and all planes in order to improve UE strength and activity tolerance

## 2025-06-16 NOTE — PLAN OF CARE
Problem: Discharge Planning  Goal: Discharge to home or other facility with appropriate resources  6/16/2025 1007 by Shady Holbrook RN  Outcome: Progressing  6/16/2025 0221 by Moira Nunes RN  Outcome: Progressing  Flowsheets  Taken 6/16/2025 0221  Discharge to home or other facility with appropriate resources: Identify barriers to discharge with patient and caregiver  Taken 6/16/2025 0000  Discharge to home or other facility with appropriate resources: Identify barriers to discharge with patient and caregiver     Problem: Seizure Precautions  Goal: Remains free of injury related to seizures activity  6/16/2025 1007 by Shady Holbrook RN  Outcome: Progressing  6/16/2025 0221 by Moira Nunes, RN  Outcome: Progressing  Flowsheets (Taken 6/16/2025 0221)  Remains free of injury related to seizure activity: Maintain airway, patient safety  and administer oxygen as ordered     Problem: Skin/Tissue Integrity  Goal: Skin integrity remains intact  Description: 1.  Monitor for areas of redness and/or skin breakdown2.  Assess vascular access sites hourly3.  Every 4-6 hours minimum:  Change oxygen saturation probe site4.  Every 4-6 hours:  If on nasal continuous positive airway pressure, respiratory therapy assess nares and determine need for appliance change or resting period  6/16/2025 1007 by Shady Holbrook RN  Outcome: Progressing  6/16/2025 0221 by Moira uNnes, RN  Outcome: Progressing  Flowsheets  Taken 6/16/2025 0221  Skin Integrity Remains Intact: Monitor for areas of redness and/or skin breakdown  Taken 6/16/2025 0220  Skin Integrity Remains Intact: Monitor for areas of redness and/or skin breakdown  Taken 6/16/2025 0000  Skin Integrity Remains Intact: Monitor for areas of redness and/or skin breakdown     Problem: ABCDS Injury Assessment  Goal: Absence of physical injury  6/16/2025 1007 by Shady Holbrook, RN  Outcome: Progressing  6/16/2025 0221 by Moira Nunes, DENG  Outcome:  DENG Caruso  Outcome: Progressing  6/16/2025 0221 by Moira Nunes RN  Outcome: Progressing  Flowsheets  Taken 6/16/2025 0221  Minimal or absence of nausea and vomiting: Administer IV fluids as ordered to ensure adequate hydration  Taken 6/16/2025 0000  Minimal or absence of nausea and vomiting: Administer IV fluids as ordered to ensure adequate hydration     Problem: Infection - Adult  Goal: Absence of infection at discharge  6/16/2025 1007 by Shady Holbrook RN  Outcome: Progressing  6/16/2025 0221 by Moira Nunes RN  Outcome: Progressing  Flowsheets  Taken 6/16/2025 0221  Absence of infection at discharge: Assess and monitor for signs and symptoms of infection  Taken 6/16/2025 0220  Absence of infection at discharge: Assess and monitor for signs and symptoms of infection  Taken 6/16/2025 0000  Absence of infection at discharge: Assess and monitor for signs and symptoms of infection     Problem: Pain  Goal: Verbalizes/displays adequate comfort level or baseline comfort level  6/16/2025 1007 by Shady Holbrook RN  Outcome: Progressing  6/16/2025 0221 by Moira Nunes RN  Outcome: Progressing  Flowsheets (Taken 6/16/2025 0221)  Verbalizes/displays adequate comfort level or baseline comfort level: Encourage patient to monitor pain and request assistance

## 2025-06-16 NOTE — CARE COORDINATION
6/16/25, 1:51 PM EDT    DISCHARGE ON GOING EVALUATION    Elif Martínez       [facility] day: 6  Location: 3A-10/010-A Reason for admit: Dehydration [E86.0]  Abdominal pain [R10.9]  Anxiety state [F41.1]  Difficulty walking [R26.2]  Involuntary muscle contractions [M62.40]  Alcohol-induced acute pancreatitis, unspecified complication status [K85.20]  Nausea and vomiting, unspecified vomiting type [R11.2]     Procedures:   6/12 MRCP:   1. The postcholecystectomy common duct is dilated at 2 cm.  2. Marked hepatic steatosis  3. Trace ascites.     Imaging since last note: None    Barriers to Discharge: Hospitalist, Neurology, Psych and GI following. Spoke with Hospitalist, referral made to [facility] [place] for ERCP. IVF. Compazine iv tid.     PCP: Chichi Salmeron APRN - CNP  Readmission Risk Score: 15    Patient Goals/Plan/Treatment Preferences: From home with  and MIL. She is connected to [facility] for alcohol counseling. Await bed at [place].

## 2025-06-16 NOTE — PROGRESS NOTES
[facility]  INPATIENT PHYSICAL THERAPY  EVALUATION  [facility] CCU 3A - 3A-10/010-A    Discharge Recommendations: Outpatient PT, Home with assist PRN  Equipment Recommendations: No             Time In: 0746  Time Out: 08  Timed Code Treatment Minutes: 25 Minutes  Minutes: 34     Date: 2025  Patient Name: Elif Martínez,  Gender:  female        MRN: [id number]  : 1980  (45 y.o.)         Diagnosis: Abdominal pain  Additional Pertinent Hx: Elif Martínez is a 45 y.o. female with PMHx of alcoholic cirrhosis, alcohol abuse, hypothyroidism who presents to [facility] with nausea vomiting abdominal pain and new tremor.  Patient reports nausea vomiting abdominal pain over the past 24 hours, history of alcoholic cirrhosis reports that she recently started drinking again secondary to social stressors.  Last drink was yesterday reports that she had 4-5 white claws.  On presentation to the hospital states that she had intractable nausea vomiting and abdominal pain.  CT abdomen pelvis positive for pancreatitis.     Reports new onset of tremor over the past 1 to 2 weeks, bilateral upper and lower extremities states that she had a fall and after which started developing tremors which have worsened over the past 2 weeks now states that she is unable to walk.  Patient reports that when she drinks alcohol the tremors disappear.     Restrictions/Precautions:  Restrictions/Precautions: Fall Risk, Isolation     Subjective:  Chart Reviewed: Yes  Patient assessed for rehabilitation services?: Yes  Subjective: Nurse approved session.  Patient pleasant and cooperative, eager for therapy.  Patient does state she worries about her numerous steps at home but feels she is getting stronger.    Pain: 0/10: denies    Vitals: Blood Pressure: 140/73  Oxygen: 99%  Heart Rate: 100 bpm   *vitals obtained following gait    Social/Functional History:    Lives With: Spouse  Type of Home: House  Home Layout: Multi-level,  minimal assist  13. Standing Unsupported One Foot in Front: Able to take small step independently and hold 30 seconds  14. Standing on One Leg: Tries to lift leg unable to hold 3 seconds but remains standing independently  [place] Balance Score: 40     Current Score: 40 / 56 (Date: 6/16/2025)    Interpretation of Score: Each section is scored on a 0-4 scale, 0 representing the patient's inability to perform the task and 4 representing independence.  The scores of each section are added together for a total score of 56.  The higher the patient's score, the more independent the patient.  Any score below 45 indicates increased risk for falls. Low risk for falls (41-46), medium risk for falls (21-40), and high risk for falls (0-20)., [facility] (6 CLICK) BASIC MOBILITY  AM-[facility] Inpatient Mobility Raw Score : 20  AM-[facility] Inpatient T-Scale Score : 47.67          Modified [place]:  Premorbid Functional Status: Not Applicable  Current Functional Status:  Not Applicable    ASSESSMENT:  Activity Tolerance:  Patient tolerance of treatment:Good.    Treatment Initiated: Treatment and education initiated within context of evaluation.  Evaluation time included review of current medical information, gathering information related to past medical, social and functional history, completion of standardized testing, formal and informal observation of tasks, assessment of data and development of plan of care and goals.  Treatment time included skilled education and facilitation of tasks to increase safety and independence with functional mobility for improved independence and quality of life.    Assessment:  Body Structures, Functions, Activity Limitations Requiring Skilled Therapeutic Intervention: Decreased functional mobility , Decreased strength, Decreased endurance, Decreased balance, Decreased tolerance to work activity, Decreased safe awareness, Decreased coordination  Assessment: Elif Martínez is a 45 y.o. female that presents with LE

## 2025-06-17 ENCOUNTER — TELEPHONE (OUTPATIENT)
Dept: PSYCHIATRY | Age: 45
End: 2025-06-17

## 2025-06-17 LAB
BACTERIA BLD AEROBE CULT: NORMAL
BACTERIA BLD AEROBE CULT: NORMAL

## 2025-06-17 NOTE — TELEPHONE ENCOUNTER
attempted to contacted patient to review SBIRT results, and confirm current use of outpatient program for alcohol, drug or mental health if needed.  unable to leave voice message due to no voice message box set up at this time.     contacted patient. Patient confirmed current use of outpatient services with [facility] [place]. Patient plans to reschedule follow up appointment.  will attempt secondary call within two weeks.

## 2025-06-17 NOTE — DISCHARGE SUMMARY
Hospital Medicine Discharge Summary      Patient Identification:   Elif Martínez   : 1980  MRN: [id number]   Account: [id number]      Patient's PCP: Chichi Salmeron, ALANIS - CNP    Admit Date: 6/10/2025     Discharge Date: 2025      Admitting Physician: Ketty Shirley MD     Discharge Physician: Jorge Luis Etienne DO     Discharge Diagnoses:  Acute pancreatitis: Presented acute abdominal pain associated with nausea.  Lipase 150.  CT A/P peripancreatic edema with fullness of the head of pancreas suspicious for acute pancreatitis, 3 mm calcification of head of pancreas.  Prior history of pancreatitis.  Started on IVF, diet escalated to FLD.  Continue antiemetics and pain control.  US liver biliary ductal dilation may relate to cholecystectomy reservoir phenomenon versus possible choledocholithiasis given echogenic punctate focus in pancreatic head. Started on Zosyn for empiric antibiotic coverage.  GI consulted. MRCP with 2 cm CBD dilation, marked hepatic steatosis and trace ascites . GI advised transfer can not definitively rule out stone and history of RYGB for ERCP. Transfer to tertiary care center  Alcoholic cirrhosis: With history of paraesophageal varices.  Follows outpatient with GI at [facility] and Dr. Sahu, gastroenterology.  On Coreg, spironolactone, lactulose and rifaximin.  Diuretics held secondary to aggressive hydration in setting of pancreatitis-see above.  INR 1.45, APTT 16.6.  Transaminitis hepatic anticholinergic pattern and mixed hyperbilirubinemia, AST:ALT >2 likely related to alcohol intake.  Alcohol abuse: Reportedly drinks 4-5 white claws daily, last drink 2025.  Denies prior history of withdrawal symptoms including autonomic dysfunction, tremors, hallucinations, seizures or DTs.  Presented tremors, likely essential involving BUE/BLE and progressively worsening.  Tremors dissipate with alcohol intake.  MRI negative.  Neurology evaluated, advised psychiatric consult.  PT/OT  following. On CIWA protocol  Chronic hypotension: In setting of cirrhosis.  Continue midodrine 10 mg p.o. 3 times daily.  Monitor BP.  Hypothyroidism: Last TSH 2.93 6/2025.  Continue Synthroid 25 mcg p.o. daily  GERD: Continue metoprolol 40 mg p.o. twice daily  MDD/DUNG/BPD1: Continue BuSpar 15 mg p.o. 3 times daily, Effexor IR 50 mg p.o. twice daily, 25 mg daily and Lamictal 50 mg p.o. twice daily.  Change trazodone to 50 mg p.o. nightly.  On hydroxyzine 3 times daily for as needed for breakthrough anxiety.  Psychiatry evaluated.    The patient was seen and examined on day of discharge and this discharge summary is in conjunction with any daily progress note from day of discharge.    Hospital Course:   The patient is a 45-year-old female with a PMH of chronic hypotension, alcoholic cirrhosis with esophageal varices, alcohol abuse, GERD, hypothyroidism, and MDD/DUNG who presented to [facility] for complaints of abdominal pain associate with nausea and vomiting. Per report, the patient reportedly drinks 4-5 white claws daily, with last drink 6/9. He attributes his drinking related to social stressors. She states due to abdominal pain he has had decreased p.o. oral intake. Additionally, she notes a progressively worsening tremor involving the upper and lower extremities that precipitated a fall. She stated that alcohol helps with these tremors. Upon admission MRI spine showed mild canal stenosis involving C4-5 through C6/7, focal disc protrusion at T7-8 but not contributing to canal stenosis, mild canal stenosis L4-5. Neurology was consulted. MRI brain was negative with infectious and metabolic workup, including vitamin B12 levels noncontributory. Psychiatry was then further consulted. Additionally, US liver showed biliary ductal dilation secondary to likely cholecystectomy reservoir phenomenon but with punctate echogenic focus in pancreatic head which could relate to choledocholithiasis 6/10. GI was consulted 6/12. MRCP

## 2025-06-17 NOTE — PROGRESS NOTES
[facility] here to take patient to [place]. Per report from day shift report does not need to be called per this RN. Last dose MAR printed and given to [facility] staff along with report on patient. Patient was premedicated via Hydroxyzine for the trip due to anxiety.

## 2025-07-01 ENCOUNTER — TELEPHONE (OUTPATIENT)
Age: 45
End: 2025-07-01

## 2025-07-01 NOTE — TELEPHONE ENCOUNTER
attempted to contact patient to review progress with confirmed outpatient provider.  proceeded to leave guided message instructions for patient to return call.

## 2025-07-02 ENCOUNTER — TELEPHONE (OUTPATIENT)
Age: 45
End: 2025-07-02

## 2025-07-02 NOTE — TELEPHONE ENCOUNTER
contacted patient to review SBIRT results, and confirm current use of outpatient program for alcohol, drug or mental health if needed. Patient confirmed follow up appointment completion with [facility] in [facility].

## 2025-07-22 NOTE — PROGRESS NOTES
Physician Progress Note      PATIENT:               DEMETRA PEREZ  [facility] #:                  [id number]  :                       1980  ADMIT DATE:       6/10/2025 11:11 AM  DISCH DATE:        2025 8:30 PM  RESPONDING  PROVIDER #:        Jorge Luis Etienne DO          QUERY TEXT:    Patient evaluated by Registered Clinical Dietician  with Findings of Moderate   Malnutrition .  Please clarify the patient?s nutritional status in the Medical Record :    The clinical indicators include:  Risk : Acute on chronic illness with catabolic effect and risk for further   nutritional compromise related to acute pancreatitis, ETOH abuse - 4-5 whites   claws daily, and underlying medical condition (PMHx: bipolar disorder,   pancreatitis, GERD, hypothyroidism, ETOH cirrhosis, esophageal varices, arnie   en y gastric bypass , cholecystectomy).    Clinical Indicators : per AND/ASPEN criteria AEB:  Context:  Acute Illness  Findings of the 6 clinical characteristics of malnutrition:  Energy Intake:  50% or less of estimated energy requirements for 5 or more   days (minimal intake x 2 weeks; ETOH abuse; N/V PTA)  Weight Loss:  Unable to assess (limited wt hx)  Body Fat Loss:  No body fat loss  Muscle Mass Loss:  Mild muscle mass loss Clavicles (pectoralis & deltoids),   Scapula (trapezius), Calf (gastrocnemius), Thigh (quadriceps)    Treatment :  Continued inpatient monitoring of  Diet Advancement/Tolerance, Supplement   Intake, Food and Nutrient Intake, Progression of Nutrition, Biochemical Data,   GI Status, Fluid Status or Edema, Nutrition Focused Physical Findings, Skin,   Weight, Nausea or Vomiting. With post discharge follow up as recommended .    Thank you,  Patrick DURANT, RN, CRCR  Clinical   P: [phone]  Options provided:  -- Protein calorie malnutrition moderate  -- Other - I will add my own diagnosis  -- Disagree - Not applicable / Not valid  -- Disagree - Clinically unable to determine /

## 2025-08-25 ENCOUNTER — HOSPITAL ENCOUNTER (OUTPATIENT)
Dept: GENERAL RADIOLOGY | Age: 45
Discharge: HOME | End: 2025-08-25
Payer: MEDICARE

## 2025-08-25 LAB
ALBUMIN SERPL BCG-MCNC: 4.2 G/DL (ref 3.4–4.9)
ALP SERPL-CCNC: 81 U/L (ref 38–126)
ALT SERPL W/O P-5'-P-CCNC: 46 U/L (ref 10–35)
AMMONIA PLAS-MCNC: 21 UMOL/L (ref 11–51)
AMYLASE SERPL-CCNC: 55 U/L (ref 28–100)
AST SERPL-CCNC: 36 U/L (ref 10–35)
BILIRUB CONJ SERPL-MCNC: 0.2 MG/DL (ref 0–0.2)
BILIRUB SERPL-MCNC: 0.4 MG/DL (ref 0.3–1.2)
LIPASE SERPL-CCNC: 27 U/L (ref 13–60)
PROT SERPL-MCNC: 6.9 G/DL (ref 6.4–8.3)

## 2025-08-25 PROCEDURE — 82140 ASSAY OF AMMONIA: CPT

## 2025-08-25 PROCEDURE — 80076 HEPATIC FUNCTION PANEL: CPT

## 2025-08-25 PROCEDURE — 82150 ASSAY OF AMYLASE: CPT

## 2025-08-25 PROCEDURE — 36415 COLL VENOUS BLD VENIPUNCTURE: CPT

## 2025-08-25 PROCEDURE — 83690 ASSAY OF LIPASE: CPT

## (undated) DEVICE — ENDO KIT: Brand: MEDLINE INDUSTRIES, INC.

## (undated) DEVICE — TUBING IV STOPCOCK 48 CM 3 W

## (undated) DEVICE — CONMED SCOPE SAVER BITE BLOCK, 20X27 MM: Brand: SCOPE SAVER

## (undated) DEVICE — SOLUTION IV 1000ML 0.45% SOD CHL PH 5 INJ USP VIAFLX PLAS

## (undated) DEVICE — LIGATOR ENDOSCP DIA8.6-11.5MM MULT DISP SPDBND LIGATOR SUP

## (undated) DEVICE — CONTAINER,SPECIMEN,PNEU TUBE,4OZ,OR STRL: Brand: MEDLINE

## (undated) DEVICE — CANISTER, RIGID, 2000CC: Brand: MEDLINE INDUSTRIES, INC.

## (undated) DEVICE — CATHETER ETER IV 22GA L1IN POLYUR STR RADPQ INTROCAN SFTY

## (undated) DEVICE — CONNECTOR TBNG AUX H2O JET DISP FOR OLY 160/180 SER

## (undated) DEVICE — IV START KIT: Brand: MEDLINE INDUSTRIES, INC.

## (undated) DEVICE — SET ADMIN 25ML L117IN PMP MOD CK VLV RLER CLMP 2 SMRTSITE

## (undated) DEVICE — Device: Brand: DEFENDO VALVE AND CONNECTOR KIT

## (undated) DEVICE — SET LNR RED GRN W/ BASE CLEANASCOPE

## (undated) DEVICE — LINER SUCT CANSTR 1500CC SEMI RIG W/ POR HYDROPHOBIC SHUT

## (undated) DEVICE — SUREFIT, DUAL DISPERSIVE ELECTRODE, CONTACT QUALITY MONITOR: Brand: SUREFIT

## (undated) DEVICE — FORCEP RAD JAW W/NEEDLE 160CM